# Patient Record
Sex: FEMALE | Race: WHITE | NOT HISPANIC OR LATINO | Employment: OTHER | URBAN - METROPOLITAN AREA
[De-identification: names, ages, dates, MRNs, and addresses within clinical notes are randomized per-mention and may not be internally consistent; named-entity substitution may affect disease eponyms.]

---

## 2017-06-14 ENCOUNTER — OFFICE VISIT (OUTPATIENT)
Dept: FAMILY MEDICINE | Facility: HOSPITAL | Age: 52
End: 2017-06-14
Attending: FAMILY MEDICINE
Payer: MEDICAID

## 2017-06-14 VITALS
BODY MASS INDEX: 36.15 KG/M2 | HEIGHT: 69 IN | SYSTOLIC BLOOD PRESSURE: 157 MMHG | WEIGHT: 244.06 LBS | HEART RATE: 108 BPM | DIASTOLIC BLOOD PRESSURE: 103 MMHG

## 2017-06-14 DIAGNOSIS — Z76.89 ENCOUNTER TO ESTABLISH CARE: Primary | ICD-10-CM

## 2017-06-14 DIAGNOSIS — Z09 HOSPITAL DISCHARGE FOLLOW-UP: ICD-10-CM

## 2017-06-14 DIAGNOSIS — Z86.19 HISTORY OF SEPSIS: ICD-10-CM

## 2017-06-14 DIAGNOSIS — R56.9 SEIZURES: ICD-10-CM

## 2017-06-14 PROCEDURE — 99214 OFFICE O/P EST MOD 30 MIN: CPT | Performed by: FAMILY MEDICINE

## 2017-06-14 RX ORDER — PANTOPRAZOLE SODIUM 40 MG/1
TABLET, DELAYED RELEASE ORAL
COMMUNITY
Start: 2017-06-01 | End: 2017-06-14

## 2017-06-14 RX ORDER — CEFUROXIME AXETIL 500 MG/1
TABLET ORAL
COMMUNITY
Start: 2017-06-01 | End: 2017-06-26

## 2017-06-14 RX ORDER — HYDROCODONE BITARTRATE AND ACETAMINOPHEN 7.5; 325 MG/1; MG/1
TABLET ORAL
COMMUNITY
Start: 2017-06-01 | End: 2017-08-04

## 2017-06-14 RX ORDER — CYCLOBENZAPRINE HCL 10 MG
TABLET ORAL
COMMUNITY
Start: 2017-04-23 | End: 2017-06-26

## 2017-06-14 RX ORDER — METRONIDAZOLE 500 MG/1
TABLET ORAL
COMMUNITY
Start: 2017-06-01 | End: 2017-06-26

## 2017-06-14 RX ORDER — AMLODIPINE BESYLATE 10 MG/1
TABLET ORAL
COMMUNITY
Start: 2017-06-01 | End: 2017-06-14

## 2017-06-14 RX ORDER — ETHOSUXIMIDE 250 MG/1
CAPSULE ORAL
COMMUNITY
Start: 2017-04-20 | End: 2017-06-14

## 2017-06-14 RX ORDER — ONDANSETRON 4 MG/1
TABLET, FILM COATED ORAL
COMMUNITY
Start: 2017-06-01 | End: 2017-06-26

## 2017-06-14 NOTE — PROGRESS NOTES
Subjective:       Patient ID: Trudi Howard is a 51 y.o. female.    Chief Complaint: Follow-up    Ms. Howard is a 51 year old female with PMH seizure d/o, fibroidectomy who presens to establish care s/p hospitalization. Patient reports she was recently hospitalized at [facility] for sepsis after passing out. Patient reports she was in the hospital for 37 days and had to have an ex lap and TRAE drain placed. Reports she has been feeling tired since leaving the hospital. Denies f/chills. States she is feeling much better overall. History of seizures, petit mal and grand mal, with last seizure 5 years ago. Currently taking Diamox, Luminal and Zarontin. States she has a prodrome and typically knows when the seizures are coming on. Sees Dr. Thao who manages her meds. Sees Dr. Mcleod for OB/GYN with last PAP and mammogram in 2014. States she will schedule follow-up with Gen Surg today for possible drain removal. No other complaints at this time.       Review of Systems   Constitutional: Negative for chills and fever.   HENT: Negative for congestion and sore throat.    Eyes: Negative for pain and redness.   Respiratory: Positive for cough. Negative for shortness of breath.    Cardiovascular: Negative for chest pain and palpitations.   Gastrointestinal: Negative for diarrhea, nausea and vomiting.   Genitourinary: Negative for difficulty urinating and dysuria.   Musculoskeletal: Negative for arthralgias and myalgias.        TRAE drain site pain.    Skin: Negative for rash.   Neurological: Negative for syncope and headaches.   Psychiatric/Behavioral: Negative for agitation and confusion.       Objective:      Vitals:    06/14/17 1029   BP: (!) 157/103   Pulse: 108     Physical Exam   Constitutional: She is oriented to person, place, and time. She appears well-developed and well-nourished. No distress.   HENT:   Head: Normocephalic and atraumatic.   Right Ear: External ear normal.   Left Ear: External ear normal.    Eyes: Conjunctivae are normal. Pupils are equal, round, and reactive to light. Right eye exhibits no discharge. Left eye exhibits no discharge.   Neck: Normal range of motion. Neck supple.   Cardiovascular: Normal rate and regular rhythm.  Exam reveals no gallop and no friction rub.    No murmur heard.  Pulmonary/Chest: Effort normal and breath sounds normal. No respiratory distress. She has no wheezes. She has no rales.   Abdominal: Soft. She exhibits no distension.   Musculoskeletal: Normal range of motion.   Neurological: She is alert and oriented to person, place, and time. She has normal reflexes.   Skin: Skin is warm. No rash noted. No erythema.   TRAE drain in place, no erythema or purulent drainage around site. Large mid-abdominal incision, well healing. Some scabbing at cephalad region. No sign of erythema or pustular drainage. Slightly decreased BS's but present. + L sided abdominal tenderness.    Psychiatric: She has a normal mood and affect. Her behavior is normal.       Assessment:       1. Encounter to establish care    2. Hospital discharge follow-up    3. Seizures    4. History of sepsis        Plan:       Encounter to establish care  -     TSH; Future; Expected date: 06/14/2017  -     CBC auto differential; Future; Expected date: 06/14/2017  -     Comprehensive metabolic panel; Future; Expected date: 06/14/2017    Hospital discharge follow-up  -     TSH; Future; Expected date: 06/14/2017  -     CBC auto differential; Future; Expected date: 06/14/2017  -     Comprehensive metabolic panel; Future; Expected date: 06/14/2017    Seizures    History of sepsis    Will order follow-up labs today. Repeat BP at 118/80. Patient denies known history of HTN, believes it to be 2/2 pain. Patient will stop Norvasc and take BP two times a day at home and bring in log in two weeks. F/U with Gen Surg today. No signs of infection on exam. Continue to follow with Dr. Thao for seizures. Records from [facility]  requested.     Return in about 2 weeks (around 6/28/2017).

## 2017-06-26 ENCOUNTER — OFFICE VISIT (OUTPATIENT)
Dept: SURGERY | Facility: CLINIC | Age: 52
End: 2017-06-26
Payer: MEDICAID

## 2017-06-26 VITALS
DIASTOLIC BLOOD PRESSURE: 72 MMHG | SYSTOLIC BLOOD PRESSURE: 122 MMHG | HEIGHT: 70 IN | WEIGHT: 143 LBS | BODY MASS INDEX: 20.47 KG/M2

## 2017-06-26 PROCEDURE — 99024 POSTOP FOLLOW-UP VISIT: CPT | Mod: ,,, | Performed by: SURGERY

## 2017-06-26 NOTE — PROGRESS NOTES
Subjective:       Patient ID: Trudi Howard is a 51 y.o. female.    Chief Complaint: Post-op Evaluation (Exp Laparotomy with Abscess drainage 4/30/2017)      HPI:  Trudi Howard is here for post-op. Patient has no systemic complaints. Post operative   pain is under control.  Tolerating diet, no nausea/vomiting.  Having normal bowel movements.        Objective:      Physical Exam   Constitutional: She is oriented to person, place, and time. She is cooperative. No distress.   Abdominal: Soft. She exhibits no distension. There is no tenderness. There is no rebound and no guarding.   Neurological: She is oriented to person, place, and time.   Skin:   Incisions are clean, dry and intact  There is no evidence of infection, hematoma or seroma        Assessment/Plan:   Abdominal abscess  -     CT Abdomen Pelvis W Wo Contrast; Future; Expected date: 06/26/2017      Patient's last drain is only putting out a little serous fluid.It was removed by me today. Everything appears to be healing well although patient still looks weak from her deal. Will get a follow-up CT scan to make sure everything is resolved.  Return depends on what the CT scan shows.

## 2017-07-21 ENCOUNTER — OFFICE VISIT (OUTPATIENT)
Dept: FAMILY MEDICINE | Facility: HOSPITAL | Age: 52
End: 2017-07-21
Payer: MEDICAID

## 2017-07-21 VITALS
BODY MASS INDEX: 33.77 KG/M2 | DIASTOLIC BLOOD PRESSURE: 81 MMHG | HEIGHT: 70 IN | WEIGHT: 235.88 LBS | HEART RATE: 89 BPM | SYSTOLIC BLOOD PRESSURE: 129 MMHG

## 2017-07-21 DIAGNOSIS — G40.909 SEIZURE DISORDER: ICD-10-CM

## 2017-07-21 DIAGNOSIS — G89.29 CHRONIC LEFT SHOULDER PAIN: Primary | ICD-10-CM

## 2017-07-21 DIAGNOSIS — Z01.30 BLOOD PRESSURE CHECK: ICD-10-CM

## 2017-07-21 DIAGNOSIS — M25.512 CHRONIC LEFT SHOULDER PAIN: Primary | ICD-10-CM

## 2017-07-21 PROCEDURE — 99214 OFFICE O/P EST MOD 30 MIN: CPT | Performed by: FAMILY MEDICINE

## 2017-07-21 RX ORDER — IBUPROFEN 600 MG/1
600 TABLET ORAL 3 TIMES DAILY
Qty: 21 TABLET | Refills: 0 | Status: SHIPPED | OUTPATIENT
Start: 2017-07-21 | End: 2017-07-28

## 2017-07-21 NOTE — PROGRESS NOTES
Subjective:       Patient ID: Trudi Howard is a 51 y.o. female.    Chief Complaint: Follow-up and Arm Pain    Ms. Howard is a 51 year old female with PMH seizure d/o, high blood pressure reading who presents for follow-up and shoulder pain. BP normal at home, 129/81 today. Shoulder pain has been going on for three months, is worse with movement and pressure and not relieved with Tylenol. Has not tried icing. States it hurts a lot to raise her arm over her head. Does not remember a trauma incident to her shoulder. Denies f/chills/swelling/skin changes.     Refused mammogram order today.       Arm Pain    Pertinent negatives include no chest pain.     Review of Systems   Constitutional: Negative for chills and fever.   HENT: Negative for congestion and sore throat.    Eyes: Negative for pain and redness.   Respiratory: Negative for cough and shortness of breath.    Cardiovascular: Negative for chest pain and palpitations.   Gastrointestinal: Negative for abdominal pain, diarrhea, nausea and vomiting.   Genitourinary: Negative for difficulty urinating and dysuria.   Musculoskeletal: Negative for arthralgias and myalgias.   Skin: Negative for rash.   Neurological: Negative for syncope and headaches.   Psychiatric/Behavioral: Negative for agitation and confusion.       Objective:      Vitals:    07/21/17 1132   BP: 129/81   Pulse: 89     Physical Exam   Constitutional: She is oriented to person, place, and time. She appears well-developed and well-nourished. No distress.   HENT:   Head: Normocephalic and atraumatic.   Right Ear: External ear normal.   Left Ear: External ear normal.   Eyes: Conjunctivae are normal. Pupils are equal, round, and reactive to light. Right eye exhibits no discharge. Left eye exhibits no discharge.   Neck: Normal range of motion. Neck supple. No thyromegaly present.   Cardiovascular: Normal rate and regular rhythm.  Exam reveals no gallop and no friction rub.    No murmur  heard.  Pulmonary/Chest: Effort normal and breath sounds normal. No respiratory distress. She has no wheezes. She has no rales.   Abdominal: Soft. Bowel sounds are normal. She exhibits no distension. There is no tenderness.   Musculoskeletal: Normal range of motion.   ROM intact, able to touch hands over head. +TTP over acromion and joint capsule. +Empty can test. Poor strength with resistance in L shoulder.    Neurological: She is alert and oriented to person, place, and time. She has normal reflexes.   Skin: Skin is warm. No rash noted. No erythema.   Psychiatric: She has a normal mood and affect. Her behavior is normal.       Assessment:       1. Chronic left shoulder pain    2. Blood pressure check    3. Seizure disorder    4. BMI 33.0-33.9,adult        Plan:       Chronic left shoulder pain  -     X-Ray Shoulder 2 or More Views Left; Future; Expected date: 07/21/2017  -     Ambulatory Referral to Physical/Occupational Therapy  -     ibuprofen (ADVIL,MOTRIN) 600 MG tablet; Take 1 tablet (600 mg total) by mouth 3 (three) times daily.  Dispense: 21 tablet; Refill: 0    Blood pressure check    Seizure disorder    BMI 33.0-33.9,adult  -     Hemoglobin A1c; Future; Expected date: 07/21/2017  -     Lipid panel; Future; Expected date: 07/21/2017    F/U and consider MRI in two weeks. Likely chronic rotator cuff tear. Patient will try Ice, ibuprofen and physical therapy. Consider ortho consult.     Return in about 2 weeks (around 8/4/2017).

## 2017-07-27 ENCOUNTER — TELEPHONE (OUTPATIENT)
Dept: SURGERY | Facility: CLINIC | Age: 52
End: 2017-07-27

## 2017-07-27 NOTE — TELEPHONE ENCOUNTER
----- Message from Navid BORJA MD sent at 7/26/2017  4:02 PM CDT -----  Call patient: CT normal; no further f/u needed

## 2017-07-27 NOTE — TELEPHONE ENCOUNTER
Tried to call pt. Phone rings once and goes to voicemail which is full.  Not able to leave message

## 2017-07-28 ENCOUNTER — HOSPITAL ENCOUNTER (OUTPATIENT)
Dept: RADIOLOGY | Facility: HOSPITAL | Age: 52
Discharge: HOME | End: 2017-07-28
Attending: FAMILY MEDICINE
Payer: MEDICAID

## 2017-07-28 DIAGNOSIS — M25.512 CHRONIC LEFT SHOULDER PAIN: ICD-10-CM

## 2017-07-28 DIAGNOSIS — G89.29 CHRONIC LEFT SHOULDER PAIN: ICD-10-CM

## 2017-07-28 PROCEDURE — 73030 X-RAY EXAM OF SHOULDER: CPT | Mod: 26,LT,, | Performed by: RADIOLOGY

## 2017-07-28 PROCEDURE — 73030 X-RAY EXAM OF SHOULDER: CPT | Mod: TC,LT

## 2017-08-04 ENCOUNTER — OFFICE VISIT (OUTPATIENT)
Dept: FAMILY MEDICINE | Facility: HOSPITAL | Age: 52
End: 2017-08-04
Attending: FAMILY MEDICINE
Payer: MEDICAID

## 2017-08-04 VITALS
WEIGHT: 224.63 LBS | BODY MASS INDEX: 33.27 KG/M2 | DIASTOLIC BLOOD PRESSURE: 83 MMHG | SYSTOLIC BLOOD PRESSURE: 136 MMHG | HEART RATE: 84 BPM | HEIGHT: 69 IN

## 2017-08-04 DIAGNOSIS — G89.29 CHRONIC LEFT SHOULDER PAIN: Primary | ICD-10-CM

## 2017-08-04 DIAGNOSIS — Z12.31 SCREENING MAMMOGRAM, ENCOUNTER FOR: ICD-10-CM

## 2017-08-04 DIAGNOSIS — M25.512 CHRONIC LEFT SHOULDER PAIN: Primary | ICD-10-CM

## 2017-08-04 PROCEDURE — 99214 OFFICE O/P EST MOD 30 MIN: CPT | Performed by: FAMILY MEDICINE

## 2017-08-04 RX ORDER — PHENOBARBITAL 32.4 MG/1
TABLET ORAL
Refills: 5 | COMMUNITY
Start: 2017-07-19
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
## 2017-08-06 NOTE — PROGRESS NOTES
I assume primary medical responsibility for this patient, I have reviewed the case history, findings, diagnosis and treatment plan with the resident and agree that the care is reasonable and necessary. This service has been performed by a resident without the presence of a teaching physician under the primary care exception  Adriana Ashley  8/6/2017

## 2017-08-15 ENCOUNTER — CLINICAL SUPPORT (OUTPATIENT)
Dept: REHABILITATION | Facility: HOSPITAL | Age: 52
End: 2017-08-15
Attending: FAMILY MEDICINE
Payer: MEDICAID

## 2017-08-15 DIAGNOSIS — M25.512 CHRONIC LEFT SHOULDER PAIN: Primary | ICD-10-CM

## 2017-08-15 DIAGNOSIS — G89.29 CHRONIC LEFT SHOULDER PAIN: Primary | ICD-10-CM

## 2017-08-15 PROCEDURE — 97161 PT EVAL LOW COMPLEX 20 MIN: CPT

## 2017-08-15 PROCEDURE — 97110 THERAPEUTIC EXERCISES: CPT

## 2017-08-15 NOTE — PROGRESS NOTES
Physical Therapy Evaluation    Name: Trudi Howard  Clinic Number: [id number]        Diagnosis:   Encounter Diagnosis   Name Primary?    Chronic left shoulder pain Yes     Physician: Dejon Rollins, *  Treatment Orders: PT Eval and Treat    Past Medical History:   Diagnosis Date    Seizures      Current Outpatient Prescriptions   Medication Sig    acetaZOLAMIDE (DIAMOX) 250 MG tablet     ethosuximide (ZARONTIN) 250 mg capsule Take 250 mg by mouth 2 (two) times daily.      phenobarbital (LUMINAL) 32.4 MG tablet 3 QAM AND 5 TS HS     No current facility-administered medications for this visit.      Review of patient's allergies indicates:   Allergen Reactions    Aspirin      Contraindicated with other meds    Amoxicillin      rash     Precautions: hx of seizures (last one 5 years ago)    Evaluation Date: 8/15/2017  Visit # authorized: 1/20  Authorization period: -12/31/2017  Plan of care Expiration: 9/5/2017      Subjective     Onset/VANCE: gradual    Primary concern/ Chief complaints:    Trudi is a 51 y.o. 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  Injury/surgery occurred on over 1 month ago. Pt is left hand dominant.     X-ray was taken and findings were as follows: Internal and external rotation views are not labeled. The patient does not achieve complete internal rotation suggesting limited mobility perhaps due to pain. No fracture, dislocation, radiopaque retained foreign body, lytic or blastic lesion or extraosseous calcification. No significant abnormality in the imaged portion of the chest.     Previous treatment included ibuprofen, rest. Pt reports that reaching, lifting, carrying increases left shoulder pain and  reports left shoulder pain at worst is a 10 on the VAS. Pt uses ice, rest,  ibuprofen to control left shoulder pain symptoms. Pt has a decreased ability to perform ADLs such as reaching, lifting, dressing. Pt reports no numbness and tingling in either UE. No cultural, environmental, or spiritual barriers identified to treatment or learning.    Pain Scale: Trudi rates pain on a scale of 0-10 to be 10 at worst; 7 currently; 5 at best .    Patient Goals: Pt would like to decrease pain and increase function so she can return to pain-reduced completion of normal daily activities.      Objective     Observation: ambulates independently, no guarded posture    Posture: rounded shoulders    Passive Range of Motion:   Shoulder Left Right   Flexion 130 170   Abduction 60 90   ER at 0 WNL WNL   ER at 90 nt nt   IR WNL WNL      Active Range of Motion:   Shoulder Left Right   Flexion 110 160   Abduction 55 55   ER at 0 75% 100%   ER at 90 nt nt   IR WNL WNL     Functional Movement Screen:   LUE: able to reach top of head from overhead, able to reach left back pocket when reaching behind  RUE: able to reach C7 from overhead, able to reach L3 when reaching behind    Strength:  Shoulder Left Right   Flexion 2+ 4   Abduction 2+ 4   ER 2+ 4   IR 3+ 4+   Mid trapezius 3+ 3+   Low trapezius 3+ 3+   Rhomboids 4 4       Special Tests:  AC Joint Left Right   AC Joint Compression Test + -   Empty Can Test + -        Joint Mobility and Palpation: ttp to L AC joint and supraspinatus muscle belly    Sensation: WNL    Flexibility: decreased in all planes due to pain    G-code Reporting:  Category: Mobility (Mobility, Self-Care, etc. )  Tool: FOTO shoulder  Score: 64% impairment  Goal:  CK ( 40%-60% impairment)  Current:  CL ( 60%-80% impairment)    PT Evaluation Completed? Yes  Discussed Plan of Care with patient: Yes    TREATMENT:  Trudi received therapeutic exercises to develop strength and endurance, flexibility for 15 minutes including:scapular rows with orange theraband 3x10    Instructed pt. Regarding:  Proper technique with all exercises. Pt demo good understanding of the education provided. Trudi demonstrated good return demonstration of activities.      Assessment     This is a 51 y.o. female referred to outpatient physical therapy and presents with a medical diagnosis of left shoulder pain and demonstrates limitations as described in the problem list. Pt will benefit from physical therapy services in order to maximize pain free and/or functional use of left shoulder. The following goals were discussed with the patient and patient is in agreement with them as to be addressed in the treatment plan. Pt was given a HEP consisting of scapular rows. Pt verbally understood the instructions as they were given and demonstrated proper form and technique during therapy. Pt was advised to perform these exercises free of pain, and to stop performing them if pain occurs.     Patient History Examination Clinical Presentation Clinical Decision Making   Comorbidities:  Hx of seizures, hx of sepsis, sacral decubitus ulcer    Personal Factors:  none     Activity and Participation Restriction:  Mobility  General tasks and demands    Body Systems:  Musculoskeletal      Body Regions:  Upper extremity Stable and uncomplicated     Low            Medical necessity is demonstrated by the following IMPAIRMENTS/PROBLEM LIST:   1)Increase in pain level limiting function   2)Decreased range of motion limiting function   3)Decreased strength limiting function   4)Lack of HEP    GOALS: Short Term Goals:  3 weeks  1. Report decreased left shoulder pain  <   / =  7  /10  to increase tolerance for completion of ADLs  2. Increased MMT of left shoulder flexion to 3+/5 in order to increase tolerance for ADLs and work activities.  3. Increased MMT of left shoulder abduction to 3+/5 in order to increase tolerance for ADLs and work activities.  4. Pt to increase left shoulder flexion range of motion to >/= 150 degrees in order to demonstrate  improvements in functional mobility.   5. Pt to tolerate HEP to improve ROM and independence with ADL's    Long Term Goals: 6 weeks  1. Report decreased left shoulder pain  <   / =  6  /10  to increase tolerance for completion of ADLs  2. Increased MMT of left shoulder flexion to 4-/5 in order to increase tolerance for ADLs and work activities.  3. Increased MMT of left shoulder abduction to 4-/5 in order to increase tolerance for ADLs and work activities.  4. Pt to increase left shoulder flexion range of motion to >/= 160 degrees in order to demonstrate improvements in functional mobility.   5. Pt to increase left shoulder abduction range of motion to >/= 120 degrees in order to demonstrate improvements in functional mobility.   6. Pt to be Independent with HEP to improve ROM and independence with ADL's      Plan     Pt will be treated by physical therapy 1-3 times a week for 6 weeks for Pt Education, HEP, therapeutic exercises, neuromuscular re-education, joint mobilizations, modalities prn to achieve established goals. Trudi may at times be seen by a PTA as part of the Rehab Team.     Cont PT for  6  weeks.     Carla Bragg, NIELS    8/16/2017    I certify the need for these services furnished under this plan of treatment and while under my care.  ______________________________ Physician/Referring Practitioner  Date of Signature

## 2017-08-16 NOTE — PLAN OF CARE
Physical Therapy Evaluation    Name: Trudi Howard  Clinic Number: [id number]        Diagnosis:   Encounter Diagnosis   Name Primary?    Chronic left shoulder pain Yes     Physician: Dejon Rollins, *  Treatment Orders: PT Eval and Treat    Past Medical History:   Diagnosis Date    Seizures      Current Outpatient Prescriptions   Medication Sig    acetaZOLAMIDE (DIAMOX) 250 MG tablet     ethosuximide (ZARONTIN) 250 mg capsule Take 250 mg by mouth 2 (two) times daily.      phenobarbital (LUMINAL) 32.4 MG tablet 3 QAM AND 5 TS HS     No current facility-administered medications for this visit.      Review of patient's allergies indicates:   Allergen Reactions    Aspirin      Contraindicated with other meds    Amoxicillin      rash     Precautions: hx of seizures (last one 5 years ago)    Evaluation Date: 8/15/2017  Visit # authorized: 1/20  Authorization period: -12/31/2017  Plan of care Expiration: 9/5/2017      Subjective     Onset/VANCE: gradual    Primary concern/ Chief complaints:    Trudi is a 51 y.o. 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  Injury/surgery occurred on over 1 month ago. Pt is left hand dominant.     X-ray was taken and findings were as follows: Internal and external rotation views are not labeled. The patient does not achieve complete internal rotation suggesting limited mobility perhaps due to pain. No fracture, dislocation, radiopaque retained foreign body, lytic or blastic lesion or extraosseous calcification. No significant abnormality in the imaged portion of the chest.     Previous treatment included ibuprofen, rest. Pt reports that reaching, lifting, carrying increases left shoulder pain and  reports left shoulder pain at worst is a 10 on the VAS. Pt uses ice, rest,  ibuprofen to control left shoulder pain symptoms. Pt has a decreased ability to perform ADLs such as reaching, lifting, dressing. Pt reports no numbness and tingling in either UE. No cultural, environmental, or spiritual barriers identified to treatment or learning.    Pain Scale: Trudi rates pain on a scale of 0-10 to be 10 at worst; 7 currently; 5 at best .    Patient Goals: Pt would like to decrease pain and increase function so she can return to pain-reduced completion of normal daily activities.      Objective     Observation: ambulates independently, no guarded posture    Posture: rounded shoulders    Passive Range of Motion:   Shoulder Left Right   Flexion 130 170   Abduction 60 90   ER at 0 WNL WNL   ER at 90 nt nt   IR WNL WNL      Active Range of Motion:   Shoulder Left Right   Flexion 110 160   Abduction 55 55   ER at 0 75% 100%   ER at 90 nt nt   IR WNL WNL     Functional Movement Screen:   LUE: able to reach top of head from overhead, able to reach left back pocket when reaching behind  RUE: able to reach C7 from overhead, able to reach L3 when reaching behind    Strength:  Shoulder Left Right   Flexion 2+ 4   Abduction 2+ 4   ER 2+ 4   IR 3+ 4+   Mid trapezius 3+ 3+   Low trapezius 3+ 3+   Rhomboids 4 4       Special Tests:  AC Joint Left Right   AC Joint Compression Test + -   Empty Can Test + -        Joint Mobility and Palpation: ttp to L AC joint and supraspinatus muscle belly    Sensation: WNL    Flexibility: decreased in all planes due to pain    G-code Reporting:  Category: Mobility (Mobility, Self-Care, etc. )  Tool: FOTO shoulder  Score: 64% impairment  Goal:  CK ( 40%-60% impairment)  Current:  CL ( 60%-80% impairment)    PT Evaluation Completed? Yes  Discussed Plan of Care with patient: Yes    TREATMENT:  Trudi received therapeutic exercises to develop strength and endurance, flexibility for 15 minutes including:scapular rows with orange theraband 3x10    Instructed pt. Regarding:  Proper technique with all exercises. Pt demo good understanding of the education provided. Trudi demonstrated good return demonstration of activities.      Assessment     This is a 51 y.o. female referred to outpatient physical therapy and presents with a medical diagnosis of left shoulder pain and demonstrates limitations as described in the problem list. Pt will benefit from physical therapy services in order to maximize pain free and/or functional use of left shoulder. The following goals were discussed with the patient and patient is in agreement with them as to be addressed in the treatment plan. Pt was given a HEP consisting of scapular rows. Pt verbally understood the instructions as they were given and demonstrated proper form and technique during therapy. Pt was advised to perform these exercises free of pain, and to stop performing them if pain occurs.     Patient History Examination Clinical Presentation Clinical Decision Making   Comorbidities:  Hx of seizures, hx of sepsis, sacral decubitus ulcer    Personal Factors:  none     Activity and Participation Restriction:  Mobility  General tasks and demands    Body Systems:  Musculoskeletal      Body Regions:  Upper extremity Stable and uncomplicated     Low            Medical necessity is demonstrated by the following IMPAIRMENTS/PROBLEM LIST:   1)Increase in pain level limiting function   2)Decreased range of motion limiting function   3)Decreased strength limiting function   4)Lack of HEP    GOALS: Short Term Goals:  3 weeks  1. Report decreased left shoulder pain  <   / =  7  /10  to increase tolerance for completion of ADLs  2. Increased MMT of left shoulder flexion to 3+/5 in order to increase tolerance for ADLs and work activities.  3. Increased MMT of left shoulder abduction to 3+/5 in order to increase tolerance for ADLs and work activities.  4. Pt to increase left shoulder flexion range of motion to >/= 150 degrees in order to demonstrate  improvements in functional mobility.   5. Pt to tolerate HEP to improve ROM and independence with ADL's    Long Term Goals: 6 weeks  1. Report decreased left shoulder pain  <   / =  6  /10  to increase tolerance for completion of ADLs  2. Increased MMT of left shoulder flexion to 4-/5 in order to increase tolerance for ADLs and work activities.  3. Increased MMT of left shoulder abduction to 4-/5 in order to increase tolerance for ADLs and work activities.  4. Pt to increase left shoulder flexion range of motion to >/= 160 degrees in order to demonstrate improvements in functional mobility.   5. Pt to increase left shoulder abduction range of motion to >/= 120 degrees in order to demonstrate improvements in functional mobility.   6. Pt to be Independent with HEP to improve ROM and independence with ADL's      Plan     Pt will be treated by physical therapy 1-3 times a week for 6 weeks for Pt Education, HEP, therapeutic exercises, neuromuscular re-education, joint mobilizations, modalities prn to achieve established goals. Trudi may at times be seen by a PTA as part of the Rehab Team.     Cont PT for  6  weeks.     Carla Bragg, NIELS    8/15/2017    I certify the need for these services furnished under this plan of treatment and while under my care.  ______________________________ Physician/Referring Practitioner  Date of Signature

## 2017-08-25 ENCOUNTER — CLINICAL SUPPORT (OUTPATIENT)
Dept: REHABILITATION | Facility: HOSPITAL | Age: 52
End: 2017-08-25
Attending: FAMILY MEDICINE
Payer: MEDICAID

## 2017-08-25 ENCOUNTER — LAB VISIT (OUTPATIENT)
Dept: LAB | Facility: HOSPITAL | Age: 52
End: 2017-08-25
Attending: OBSTETRICS & GYNECOLOGY
Payer: MEDICAID

## 2017-08-25 ENCOUNTER — OFFICE VISIT (OUTPATIENT)
Dept: OBSTETRICS AND GYNECOLOGY | Facility: CLINIC | Age: 52
End: 2017-08-25
Payer: MEDICAID

## 2017-08-25 VITALS
HEIGHT: 69 IN | WEIGHT: 227.94 LBS | SYSTOLIC BLOOD PRESSURE: 124 MMHG | DIASTOLIC BLOOD PRESSURE: 86 MMHG | BODY MASS INDEX: 33.76 KG/M2

## 2017-08-25 DIAGNOSIS — Z01.419 WELL WOMAN EXAM WITH ROUTINE GYNECOLOGICAL EXAM: ICD-10-CM

## 2017-08-25 DIAGNOSIS — M25.512 CHRONIC LEFT SHOULDER PAIN: Primary | ICD-10-CM

## 2017-08-25 DIAGNOSIS — Z12.31 VISIT FOR SCREENING MAMMOGRAM: ICD-10-CM

## 2017-08-25 DIAGNOSIS — Z12.4 ROUTINE PAPANICOLAOU SMEAR: ICD-10-CM

## 2017-08-25 DIAGNOSIS — N94.6 DYSMENORRHEA: Primary | ICD-10-CM

## 2017-08-25 DIAGNOSIS — N94.6 DYSMENORRHEA: ICD-10-CM

## 2017-08-25 DIAGNOSIS — G89.29 CHRONIC LEFT SHOULDER PAIN: Primary | ICD-10-CM

## 2017-08-25 LAB
ALBUMIN SERPL BCP-MCNC: 4 G/DL
ALP SERPL-CCNC: 144 U/L
ALT SERPL W/O P-5'-P-CCNC: 14 U/L
ANION GAP SERPL CALC-SCNC: 10 MMOL/L
AST SERPL-CCNC: 14 U/L
BILIRUB SERPL-MCNC: 0.2 MG/DL
BUN SERPL-MCNC: 9 MG/DL
CALCIUM SERPL-MCNC: 9.7 MG/DL
CHLORIDE SERPL-SCNC: 110 MMOL/L
CO2 SERPL-SCNC: 20 MMOL/L
CREAT SERPL-MCNC: 0.7 MG/DL
EST. GFR  (AFRICAN AMERICAN): >60 ML/MIN/1.73 M^2
EST. GFR  (NON AFRICAN AMERICAN): >60 ML/MIN/1.73 M^2
FSH SERPL-ACNC: 62.8 MIU/ML
GLUCOSE SERPL-MCNC: 100 MG/DL
POTASSIUM SERPL-SCNC: 3.7 MMOL/L
PROT SERPL-MCNC: 8.1 G/DL
SODIUM SERPL-SCNC: 140 MMOL/L
T4 FREE SERPL-MCNC: 0.84 NG/DL
TSH SERPL DL<=0.005 MIU/L-ACNC: 4.28 UIU/ML

## 2017-08-25 PROCEDURE — 84443 ASSAY THYROID STIM HORMONE: CPT

## 2017-08-25 PROCEDURE — 88175 CYTOPATH C/V AUTO FLUID REDO: CPT

## 2017-08-25 PROCEDURE — 36415 COLL VENOUS BLD VENIPUNCTURE: CPT

## 2017-08-25 PROCEDURE — 84439 ASSAY OF FREE THYROXINE: CPT

## 2017-08-25 PROCEDURE — 99212 OFFICE O/P EST SF 10 MIN: CPT | Mod: PBBFAC,PO | Performed by: OBSTETRICS & GYNECOLOGY

## 2017-08-25 PROCEDURE — 80053 COMPREHEN METABOLIC PANEL: CPT

## 2017-08-25 PROCEDURE — 99386 PREV VISIT NEW AGE 40-64: CPT | Mod: S$PBB,,, | Performed by: OBSTETRICS & GYNECOLOGY

## 2017-08-25 PROCEDURE — 83001 ASSAY OF GONADOTROPIN (FSH): CPT

## 2017-08-25 PROCEDURE — 99999 PR PBB SHADOW E&M-EST. PATIENT-LVL II: CPT | Mod: PBBFAC,,, | Performed by: OBSTETRICS & GYNECOLOGY

## 2017-08-25 PROCEDURE — 97110 THERAPEUTIC EXERCISES: CPT

## 2017-08-25 NOTE — PROGRESS NOTES
Physical Therapy Progress Note     Name: Trudi Howard  Clinic Number: [id number]  Diagnosis: No diagnosis found.  Physician: Dejon Rollins, *  Treatment Orders: PT Eval and Treat  Past Medical History:   Diagnosis Date    Seizures        Precautions: standard;  Visit #: 2/20  Authorization Period Expiration:  12/31/2017  Referring Provider: Dejon Rollins, *  Subjective   Pt reports: Continued pain in the L shoulder.  Pain Scale:  5/10    Objective     Patient received individual therapy to increase strength with 1 other patients with activities as follows:     Trudi received therapeutic exercises to develop strength for 50 minutes including:   UBE x6 min fwd  Supine dowel flexion AROM 2x10  Isometric L shoulder ER with YTB 3x8  Isometric IR 3x8 YTB 3x8  Scapular rows with orange theraband 3x10  Scapular retractions 2x10    Ice to L shoulder x10 min at end of treatment    Written Home Exercises Provided: on initial evaluation, includes scapular rows with orange theraband 3x10  Pt demo Good understanding of the education provided. Trudi demonstrated Good return demonstration of activities.     Education provided re:  No spiritual or educational barriers to learning provided    Pt has no cultural, educational or language barriers to learning provided.  Assessment   This is a 51 y.o. female referred to outpatient physical therapy and presents with a medical diagnosis of No diagnosis found. and demonstrates limitations as described in the problem list Pt prognosis is Good. Pt will continue to benefit from skilled outpatient physical therapy to address the deficits listed below in the problem list, provide pt/family education and to maximize pt's level of independence in the home and community environment.     Pt tolerated treatment well and was able to complete all exercises without increases in pain. Had some pain at the end of isometric ER exercises  which decreased with decreased effort. Continue as tolerated.     Anticipated barriers to physical therapy: none    Pt's spiritual, cultural and educational needs considered and pt agreeable to plan of care and goals as stated below:     GOALS: Short Term Goals:  3 weeks  1. Report decreased left shoulder pain  <   / =  7  /10  to increase tolerance for completion of ADLs  2. Increased MMT of left shoulder flexion to 3+/5 in order to increase tolerance for ADLs and work activities.  3. Increased MMT of left shoulder abduction to 3+/5 in order to increase tolerance for ADLs and work activities.  4. Pt to increase left shoulder flexion range of motion to >/= 150 degrees in order to demonstrate improvements in functional mobility.   5. Pt to tolerate HEP to improve ROM and independence with ADL's    Long Term Goals: 6 weeks  1. Report decreased left shoulder pain  <   / =  6  /10  to increase tolerance for completion of ADLs  2. Increased MMT of left shoulder flexion to 4-/5 in order to increase tolerance for ADLs and work activities.  3. Increased MMT of left shoulder abduction to 4-/5 in order to increase tolerance for ADLs and work activities.  4. Pt to increase left shoulder flexion range of motion to >/= 160 degrees in order to demonstrate improvements in functional mobility.   5. Pt to increase left shoulder abduction range of motion to >/= 120 degrees in order to demonstrate improvements in functional mobility.   6. Pt to be Independent with HEP to improve ROM and independence with ADL's     Plan   Continue with established Plan of Care towards PT goals.   PT/PTA face-to-face:discussed Pt's POC and progress towards established PT goals.    Carla Bragg DPT  08/25/2017

## 2017-08-28 NOTE — PROGRESS NOTES
"HPI:   51 y.o.   OB History      Para Term  AB Living    3 3 3     3    SAB TAB Ectopic Multiple Live Births                    Patient's last menstrual period was 2017 (within weeks).    Patient is  here for her annual gynecologic exam.  She has no complaints.     ROS:  GENERAL: No fever, chills, fatigability or weight loss.  SKIN: No rashes, itching or changes in color or texture of skin.  HEAD: No headaches or recent head trauma.  EYES: Visual acuity fine. No photophobia, ocular pain or diplopia.  EARS: Denies ear pain, discharge or vertigo.  NOSE: No loss of smell, no epistaxis or postnasal drip.  MOUTH & THROAT: No hoarseness or change in voice. No excessive gum bleeding.  NODES: Denies swollen glands.  CHEST: Denies MARTINEZ, cyanosis, wheezing, cough and sputum production.  CARDIOVASCULAR: Denies chest pain, PND, orthopnea or reduced exercise tolerance.  ABDOMEN: Appetite fine. No weight loss. Denies diarrhea, abdominal pain, hematemesis or blood in stool.  URINARY: No flank pain, dysuria or hematuria.  PERIPHERAL VASCULAR: No claudication or cyanosis.  MUSCULOSKELETAL: No joint stiffness or swelling. Denies back pain.  NEUROLOGIC: No history of seizures, paralysis, alteration of gait or coordination.    PE:   /86   Ht 5' 9" (1.753 m)   Wt 103.4 kg (227 lb 15.3 oz)   LMP 2017 (Within Weeks)   BMI 33.66 kg/m²   APPEARANCE: Well nourished, well developed, in no acute distress.  NECK: Neck symmetric without masses or thyromegaly.  BREASTS: Symmetrical, no skin changes or visible lesions. No palpable masses, nipple discharge or adenopathy bilaterally.  ABDOMEN: Flat. Soft. No tenderness or masses. No hepatosplenomegaly. No hernias. No CVA tenderness.  VULVA: No lesions. Normal female genitalia.  URETHRAL MEATUS: Normal size and location, no lesions, no prolapse.  URETHRA: No masses, tenderness, prolapse or scarring.  VAGINA: Moist and well rugated, no discharge, no significant " cystocele or rectocele.  CERVIX: No lesions and discharge. PAP done.  UTERUS: Normal size, regular shape, mobile, non-tender, bladder base nontender.  ADNEXA: No masses, tenderness or CDS nodularity.  ANUS PERINEUM: Normal.    PROCEDURES:  Pap smear    Assessment:  Normal Gynecologic Exam    Plan:  Mammogram and Colonoscopy if indicated by current recommendations.  Return to clinic in one year or for any problems or complaints.  Cycles irreg, perimenopause

## 2017-08-29 ENCOUNTER — TELEPHONE (OUTPATIENT)
Dept: OBSTETRICS AND GYNECOLOGY | Facility: CLINIC | Age: 52
End: 2017-08-29

## 2017-09-01 ENCOUNTER — CLINICAL SUPPORT (OUTPATIENT)
Dept: REHABILITATION | Facility: HOSPITAL | Age: 52
End: 2017-09-01
Attending: FAMILY MEDICINE
Payer: MEDICAID

## 2017-09-01 DIAGNOSIS — M25.512 CHRONIC LEFT SHOULDER PAIN: Primary | ICD-10-CM

## 2017-09-01 DIAGNOSIS — G89.29 CHRONIC LEFT SHOULDER PAIN: Primary | ICD-10-CM

## 2017-09-01 PROCEDURE — 97110 THERAPEUTIC EXERCISES: CPT

## 2017-09-01 NOTE — PROGRESS NOTES
Physical Therapy Progress Note     Name: Trudi Howard  Clinic Number: [id number]  Diagnosis: No diagnosis found.  Physician: Dejon Rollins, *  Treatment Orders: PT Eval and Treat  Past Medical History:   Diagnosis Date    Seizures        Precautions: standard;  Visit #: 3/20  Authorization Period Expiration:  12/31/2017  Referring Provider: Dejon Rollins, *  Subjective   Pt reports: Continued pain in the L shoulder.  Pain Scale:  5/10    Objective     Patient received individual therapy to increase strength with 1 other patients with activities as follows:     Trudi received therapeutic exercises to develop strength for 50 minutes including:   UBE x6 min fwd  Supine dowel flexion AROM 2x10  Isometric L shoulder ER with YTB 3x8  Isometric IR 3x8 YTB 3x8  Scapular rows with orange theraband 3x10  Scapular retractions 2x10    Ice to L shoulder x10 min at end of treatment    Written Home Exercises Provided: on initial evaluation, includes scapular rows with orange theraband 3x10  Pt demo Good understanding of the education provided. Trudi demonstrated Good return demonstration of activities.     Education provided re:  No spiritual or educational barriers to learning provided    Pt has no cultural, educational or language barriers to learning provided.  Assessment   This is a 51 y.o. female referred to outpatient physical therapy and presents with a medical diagnosis of No diagnosis found. and demonstrates limitations as described in the problem list Pt prognosis is Good. Pt will continue to benefit from skilled outpatient physical therapy to address the deficits listed below in the problem list, provide pt/family education and to maximize pt's level of independence in the home and community environment.     Pt tolerated treatment well and was able to complete all exercises without increases in pain. Had some pain at the end of isometric ER exercises  which decreased with decreased effort. Continue as tolerated.     Anticipated barriers to physical therapy: none    Pt's spiritual, cultural and educational needs considered and pt agreeable to plan of care and goals as stated below:     GOALS: Short Term Goals:  3 weeks  1. Report decreased left shoulder pain  <   / =  7  /10  to increase tolerance for completion of ADLs  2. Increased MMT of left shoulder flexion to 3+/5 in order to increase tolerance for ADLs and work activities.  3. Increased MMT of left shoulder abduction to 3+/5 in order to increase tolerance for ADLs and work activities.  4. Pt to increase left shoulder flexion range of motion to >/= 150 degrees in order to demonstrate improvements in functional mobility.   5. Pt to tolerate HEP to improve ROM and independence with ADL's    Long Term Goals: 6 weeks  1. Report decreased left shoulder pain  <   / =  6  /10  to increase tolerance for completion of ADLs  2. Increased MMT of left shoulder flexion to 4-/5 in order to increase tolerance for ADLs and work activities.  3. Increased MMT of left shoulder abduction to 4-/5 in order to increase tolerance for ADLs and work activities.  4. Pt to increase left shoulder flexion range of motion to >/= 160 degrees in order to demonstrate improvements in functional mobility.   5. Pt to increase left shoulder abduction range of motion to >/= 120 degrees in order to demonstrate improvements in functional mobility.   6. Pt to be Independent with HEP to improve ROM and independence with ADL's     Plan   Continue with established Plan of Care towards PT goals.   PT/PTA face-to-face:discussed Pt's POC and progress towards established PT goals.    Carla Bragg DPT  09/01/2017

## 2017-09-08 ENCOUNTER — CLINICAL SUPPORT (OUTPATIENT)
Dept: REHABILITATION | Facility: HOSPITAL | Age: 52
End: 2017-09-08
Attending: FAMILY MEDICINE
Payer: MEDICAID

## 2017-09-08 DIAGNOSIS — M25.512 CHRONIC LEFT SHOULDER PAIN: Primary | ICD-10-CM

## 2017-09-08 DIAGNOSIS — G89.29 CHRONIC LEFT SHOULDER PAIN: Primary | ICD-10-CM

## 2017-09-08 PROCEDURE — 97110 THERAPEUTIC EXERCISES: CPT

## 2017-09-08 NOTE — PROGRESS NOTES
Physical Therapy Progress Note     Name: Trudi Howard  Clinic Number: [id number]  Diagnosis:   Encounter Diagnosis   Name Primary?    Chronic left shoulder pain Yes     Physician: Dejon Rollins, *  Treatment Orders: PT Eval and Treat  Past Medical History:   Diagnosis Date    Seizures        Precautions: standard;  Visit #: 3/20  Authorization Period Expiration:  12/31/2017  Referring Provider: Dejon Rollins, *  Subjective   Pt reports: Pt reports that she was unable to make her last visit due to having to drive her son to school to take a test.  Pain Scale:  5/10    Objective     Patient received individual therapy to increase strength with 1 other patients with activities as follows:     Trudi received therapeutic exercises to develop strength for 50 minutes including:   UBE x3 min fwd/3 min back on schwinn  Supine dowel flexion AROM 2x10  Isometric L shoulder ER with YTB 3x8  Isometric IR 3x8 YTB 3x8  Scapular rows with orange theraband 3x10  Scapular retractions 2x10    Ice to L shoulder x10 min at end of treatment    Written Home Exercises Provided: on initial evaluation, includes scapular rows with orange theraband 3x10  Pt demo Good understanding of the education provided. Trudi demonstrated Good return demonstration of activities.     Education provided re:  No spiritual or educational barriers to learning provided    Pt has no cultural, educational or language barriers to learning provided.  Assessment   This is a 51 y.o. female referred to outpatient physical therapy and presents with a medical diagnosis of chronic left shoulder pain and demonstrates limitations as described in the problem list Pt prognosis is Good. Pt will continue to benefit from skilled outpatient physical therapy to address the deficits listed below in the problem list, provide pt/family education and to maximize pt's level of independence in the home and community  environment.     Pt tolerated treatment well and was able to complete all exercises without increases in pain. Continues to havesome pain at the end of isometric ER exercises which decreased with decreased effort. Continue as tolerated.     Anticipated barriers to physical therapy: none    Pt's spiritual, cultural and educational needs considered and pt agreeable to plan of care and goals as stated below:     GOALS: Short Term Goals:  3 weeks  1. Report decreased left shoulder pain  <   / =  7  /10  to increase tolerance for completion of ADLs  2. Increased MMT of left shoulder flexion to 3+/5 in order to increase tolerance for ADLs and work activities.  3. Increased MMT of left shoulder abduction to 3+/5 in order to increase tolerance for ADLs and work activities.  4. Pt to increase left shoulder flexion range of motion to >/= 150 degrees in order to demonstrate improvements in functional mobility.   5. Pt to tolerate HEP to improve ROM and independence with ADL's    Long Term Goals: 6 weeks  1. Report decreased left shoulder pain  <   / =  6  /10  to increase tolerance for completion of ADLs  2. Increased MMT of left shoulder flexion to 4-/5 in order to increase tolerance for ADLs and work activities.  3. Increased MMT of left shoulder abduction to 4-/5 in order to increase tolerance for ADLs and work activities.  4. Pt to increase left shoulder flexion range of motion to >/= 160 degrees in order to demonstrate improvements in functional mobility.   5. Pt to increase left shoulder abduction range of motion to >/= 120 degrees in order to demonstrate improvements in functional mobility.   6. Pt to be Independent with HEP to improve ROM and independence with ADL's     Plan   Continue with established Plan of Care towards PT goals.   PT/PTA face-to-face:discussed Pt's POC and progress towards established PT goals.    Carla Bragg DPT  09/08/2017

## 2017-09-21 ENCOUNTER — CLINICAL SUPPORT (OUTPATIENT)
Dept: REHABILITATION | Facility: HOSPITAL | Age: 52
End: 2017-09-21
Attending: FAMILY MEDICINE
Payer: MEDICAID

## 2017-09-21 DIAGNOSIS — M25.512 CHRONIC LEFT SHOULDER PAIN: Primary | ICD-10-CM

## 2017-09-21 DIAGNOSIS — G89.29 CHRONIC LEFT SHOULDER PAIN: Primary | ICD-10-CM

## 2017-09-21 PROCEDURE — 97110 THERAPEUTIC EXERCISES: CPT

## 2017-09-22 NOTE — PROGRESS NOTES
Physical Therapy Progress Note     Name: Trudi Howard  Clinic Number: [id number]  Diagnosis:   Encounter Diagnosis   Name Primary?    Chronic left shoulder pain Yes     Physician: Dejon Rollins, *  Treatment Orders: PT Eval and Treat  Past Medical History:   Diagnosis Date    Seizures        Precautions: standard;  Visit #: 3/20  Authorization Period Expiration:  12/31/2017  Referring Provider: Dejon Rollins, *  Subjective   Pt reports: Pt reports that she was unable to make her last visit due to having to drive her son to school to take a test.  Pain Scale:  5/10    Objective     Patient received individual therapy to increase strength with 1 other patients with activities as follows:     Trudi received therapeutic exercises to develop strength for 10 minutes including:   Serratus slides in pillowcase 1x2 (discontinued due to pain)    Pt received non-thermal ultrasound (20% pulsed, 1.2W, 3Mhz) for 8 minutes to the L shoulder    Pt received manual therapy x10 minutes including cabrera taping to the L shoulder    Ice to L shoulder x10 min with IFC tens for 30 min     Written Home Exercises Provided: on initial evaluation, includes scapular rows with orange theraband 3x10  Pt demo Good understanding of the education provided. Trudi demonstrated Good return demonstration of activities.     Education provided re:  No spiritual or educational barriers to learning provided    Pt has no cultural, educational or language barriers to learning provided.  Assessment   This is a 51 y.o. female referred to outpatient physical therapy and presents with a medical diagnosis of chronic left shoulder pain and demonstrates limitations as described in the problem list Pt prognosis is Good. Pt will continue to benefit from skilled outpatient physical therapy to address the deficits listed below in the problem list, provide pt/family education and to maximize pt's level  of independence in the home and community environment.     Pt tolerated treatment poorly and was unable to complete today's therex. Tolerated modalities and Valdivia taping well. Continue as tolerated at next visit.     Anticipated barriers to physical therapy: none    Pt's spiritual, cultural and educational needs considered and pt agreeable to plan of care and goals as stated below:     GOALS: Short Term Goals:  3 weeks  1. Report decreased left shoulder pain  <   / =  7  /10  to increase tolerance for completion of ADLs  2. Increased MMT of left shoulder flexion to 3+/5 in order to increase tolerance for ADLs and work activities.  3. Increased MMT of left shoulder abduction to 3+/5 in order to increase tolerance for ADLs and work activities.  4. Pt to increase left shoulder flexion range of motion to >/= 150 degrees in order to demonstrate improvements in functional mobility.   5. Pt to tolerate HEP to improve ROM and independence with ADL's    Long Term Goals: 6 weeks  1. Report decreased left shoulder pain  <   / =  6  /10  to increase tolerance for completion of ADLs  2. Increased MMT of left shoulder flexion to 4-/5 in order to increase tolerance for ADLs and work activities.  3. Increased MMT of left shoulder abduction to 4-/5 in order to increase tolerance for ADLs and work activities.  4. Pt to increase left shoulder flexion range of motion to >/= 160 degrees in order to demonstrate improvements in functional mobility.   5. Pt to increase left shoulder abduction range of motion to >/= 120 degrees in order to demonstrate improvements in functional mobility.   6. Pt to be Independent with HEP to improve ROM and independence with ADL's     Plan   Continue with established Plan of Care towards PT goals.   PT/PTA face-to-face:discussed Pt's POC and progress towards established PT goals.    Carla Bragg DPT  09/22/2017

## 2017-09-29 ENCOUNTER — CLINICAL SUPPORT (OUTPATIENT)
Dept: REHABILITATION | Facility: HOSPITAL | Age: 52
End: 2017-09-29
Attending: FAMILY MEDICINE
Payer: MEDICAID

## 2017-09-29 DIAGNOSIS — M25.512 CHRONIC LEFT SHOULDER PAIN: Primary | ICD-10-CM

## 2017-09-29 DIAGNOSIS — G89.29 CHRONIC LEFT SHOULDER PAIN: Primary | ICD-10-CM

## 2017-09-29 PROCEDURE — 97110 THERAPEUTIC EXERCISES: CPT

## 2017-09-29 NOTE — PROGRESS NOTES
Physical Therapy Progress Note     Name: Trudi Howard  Clinic Number: [id number]  Diagnosis:   Encounter Diagnosis   Name Primary?    Chronic left shoulder pain Yes     Physician: Dejon Rollins, *  Treatment Orders: PT Eval and Treat  Past Medical History:   Diagnosis Date    Seizures        Precautions: standard;  Visit #: 3/20  Authorization Period Expiration:  12/31/2017  Referring Provider: Dejon Rollins, *  Subjective   Pt reports: Pt reports feeling a little better today, still a lot of pain.  Pain Scale:  5/10    Objective     Patient received individual therapy to increase strength with 1 other patients with activities as follows:     Trudi received therapeutic exercises to develop strength for 40 minutes including:   Scapular retractions with YTB  Seated LUE ER isometrics with YTB  Standing shoulder isometrics into flexion, abduction and extension on wall 2x10 each    Pt received non-thermal ultrasound (20% pulsed, 1.2W, 3Mhz) for 8 minutes to the L shoulder    Ice to L shoulder x10 min with IFC tens for 30 min during treatment    Written Home Exercises Provided: on initial evaluation, includes scapular rows with orange theraband 3x10  Pt demo Good understanding of the education provided. Trudi demonstrated Good return demonstration of activities.     Education provided re:  No spiritual or educational barriers to learning provided    Pt has no cultural, educational or language barriers to learning provided.  Assessment   This is a 51 y.o. female referred to outpatient physical therapy and presents with a medical diagnosis of chronic left shoulder pain and demonstrates limitations as described in the problem list Pt prognosis is Good. Pt will continue to benefit from skilled outpatient physical therapy to address the deficits listed below in the problem list, provide pt/family education and to maximize pt's level of independence in the home  and community environment.     Pt tolerated treatment poorly and was unable to complete today's therex. Tolerated modalities well. Continue as tolerated at next visit.     Anticipated barriers to physical therapy: none    Pt's spiritual, cultural and educational needs considered and pt agreeable to plan of care and goals as stated below:     GOALS: Short Term Goals:  3 weeks  1. Report decreased left shoulder pain  <   / =  7  /10  to increase tolerance for completion of ADLs  2. Increased MMT of left shoulder flexion to 3+/5 in order to increase tolerance for ADLs and work activities.  3. Increased MMT of left shoulder abduction to 3+/5 in order to increase tolerance for ADLs and work activities.  4. Pt to increase left shoulder flexion range of motion to >/= 150 degrees in order to demonstrate improvements in functional mobility.   5. Pt to tolerate HEP to improve ROM and independence with ADL's    Long Term Goals: 6 weeks  1. Report decreased left shoulder pain  <   / =  6  /10  to increase tolerance for completion of ADLs  2. Increased MMT of left shoulder flexion to 4-/5 in order to increase tolerance for ADLs and work activities.  3. Increased MMT of left shoulder abduction to 4-/5 in order to increase tolerance for ADLs and work activities.  4. Pt to increase left shoulder flexion range of motion to >/= 160 degrees in order to demonstrate improvements in functional mobility.   5. Pt to increase left shoulder abduction range of motion to >/= 120 degrees in order to demonstrate improvements in functional mobility.   6. Pt to be Independent with HEP to improve ROM and independence with ADL's     Plan   Continue with established Plan of Care towards PT goals.   PT/PTA face-to-face:discussed Pt's POC and progress towards established PT goals.    Carla Bragg DPT  09/29/2017

## 2017-10-09 ENCOUNTER — CLINICAL SUPPORT (OUTPATIENT)
Dept: REHABILITATION | Facility: HOSPITAL | Age: 52
End: 2017-10-09
Attending: FAMILY MEDICINE
Payer: MEDICAID

## 2017-10-09 DIAGNOSIS — G89.29 CHRONIC LEFT SHOULDER PAIN: Primary | ICD-10-CM

## 2017-10-09 DIAGNOSIS — M25.512 CHRONIC LEFT SHOULDER PAIN: Primary | ICD-10-CM

## 2017-10-09 PROCEDURE — 97110 THERAPEUTIC EXERCISES: CPT

## 2017-10-09 NOTE — PROGRESS NOTES
Physical Therapy Treatment Note     Name: Trudi Howard  Clinic Number: [id number]  Diagnosis:   Encounter Diagnosis   Name Primary?    Chronic left shoulder pain Yes     Physician: Dejon Rollins, *  Treatment Orders: PT Eval and Treat  Past Medical History:   Diagnosis Date    Seizures        Precautions: standard;  Visit #: 7/20  Authorization Period Expiration:  12/31/2017  Referring Provider: Dejon Rollins, *  Subjective   Pt reports:  Has experienced increased pain over the last two days.  Pain Scale:  7 or 8/10 L shoulder pain    Objective     Patient received individual therapy to increase strength with 0 other patients with activities as follows:     Trudi received therapeutic exercises to develop strength for 35 minutes including:     Scapular retractions with YTB 2x15  Standing shoulder isometrics into flexion, abduction and extension on wall 2x10 each  Seated LUE ER isometrics with YTB 2x15    Pt received non-thermal ultrasound (20% pulsed, 1.2W, 3Mhz) for 8 minutes to the L shoulder    Ice to L shoulder x10 min with IFC tens for 10 min during treatment    Written Home Exercises Provided: on initial evaluation, includes scapular rows with orange theraband 3x10  Pt demo Good understanding of the education provided. Trudi demonstrated Good return demonstration of activities.     Education provided re:  No spiritual or educational barriers to learning provided    Pt has no cultural, educational or language barriers to learning provided.  Assessment   This is a 51 y.o. female referred to outpatient physical therapy and presents with a medical diagnosis of chronic left shoulder pain and demonstrates limitations as described in the problem list Pt prognosis is Good. Pt will continue to benefit from skilled outpatient physical therapy to address the deficits listed below in the problem list, provide pt/family education and to maximize pt's level  of independence in the home and community environment.     Pt arrived with significant L shoulder pain and tolerated therapeutic exercises fairly secondary to pain. Pt, however, tolerated modalities well. Continue as tolerated at next visit.     Anticipated barriers to physical therapy: none    Pt's spiritual, cultural and educational needs considered and pt agreeable to plan of care and goals as stated below:     GOALS: Short Term Goals:  3 weeks  1. Report decreased left shoulder pain  <   / =  7  /10  to increase tolerance for completion of ADLs  2. Increased MMT of left shoulder flexion to 3+/5 in order to increase tolerance for ADLs and work activities.  3. Increased MMT of left shoulder abduction to 3+/5 in order to increase tolerance for ADLs and work activities.  4. Pt to increase left shoulder flexion range of motion to >/= 150 degrees in order to demonstrate improvements in functional mobility.   5. Pt to tolerate HEP to improve ROM and independence with ADL's    Long Term Goals: 6 weeks  1. Report decreased left shoulder pain  <   / =  6  /10  to increase tolerance for completion of ADLs  2. Increased MMT of left shoulder flexion to 4-/5 in order to increase tolerance for ADLs and work activities.  3. Increased MMT of left shoulder abduction to 4-/5 in order to increase tolerance for ADLs and work activities.  4. Pt to increase left shoulder flexion range of motion to >/= 160 degrees in order to demonstrate improvements in functional mobility.   5. Pt to increase left shoulder abduction range of motion to >/= 120 degrees in order to demonstrate improvements in functional mobility.   6. Pt to be Independent with HEP to improve ROM and independence with ADL's     Plan   Continue with established Plan of Care towards PT goals.   PT/PTA face-to-face:discussed Pt's POC and progress towards established PT goals.    Harlan Lopez, PTA  10/09/2017

## 2017-10-13 ENCOUNTER — CLINICAL SUPPORT (OUTPATIENT)
Dept: REHABILITATION | Facility: HOSPITAL | Age: 52
End: 2017-10-13
Attending: FAMILY MEDICINE
Payer: MEDICAID

## 2017-10-13 DIAGNOSIS — M25.512 CHRONIC LEFT SHOULDER PAIN: Primary | ICD-10-CM

## 2017-10-13 DIAGNOSIS — G89.29 CHRONIC LEFT SHOULDER PAIN: Primary | ICD-10-CM

## 2017-10-13 PROCEDURE — 97110 THERAPEUTIC EXERCISES: CPT

## 2017-10-13 NOTE — PROGRESS NOTES
Physical Therapy Treatment Note     Name: Trudi Howard  [facility] Number: [id number]  Diagnosis:   Encounter Diagnosis   Name Primary?    Chronic left shoulder pain Yes     Physician: Dejon Rollins, *  Treatment Orders: PT Eval and Treat  Past Medical History:   Diagnosis Date    Seizures        Precautions: standard;  Visit #: 7/20  Authorization Period Expiration:  12/31/2017  Referring Provider: Dejon Rollins, *  Subjective   Pt reports:  Has experienced increased pain over the last two days.  Pain Scale: 8/10 L shoulder pain    Objective     Patient received individual therapy to increase strength with 0 other patients with activities as follows:     Trudi received therapeutic exercises to develop strength for 35 minutes including:     Scapular retractions with YTB 2x15  Standing shoulder isometrics into flexion, abduction and extension on wall 2x10 each  Seated LUE ER isometrics with YTB 2x15    Ice to L shoulder x10 min with IFC tens for 30 min during treatment    Written Home Exercises Provided: on initial evaluation, includes scapular rows with orange theraband 3x10  Pt demo Good understanding of the education provided. Trudi demonstrated Good return demonstration of activities.     Education provided re:  No spiritual or educational barriers to learning provided    Pt has no cultural, educational or language barriers to learning provided.  Assessment   This is a 51 y.o. female referred to outpatient physical therapy and presents with a medical diagnosis of chronic left shoulder pain and demonstrates limitations as described in the problem list Pt prognosis is Good. Pt will continue to benefit from skilled outpatient physical therapy to address the deficits listed below in the problem list, provide pt/family education and to maximize pt's level of independence in the home and community environment.     Pt arrived with significant L shoulder pain  and tolerated therapeutic exercises fairly secondary to pain. Pt, however, tolerated modalities well. Continue as tolerated at next visit. Pt remains highly pain dominant and has poor tolerance for gentle isometric exercises. Because of this, she may benefit from further diagnostic imaging at this time.    Anticipated barriers to physical therapy: none    Pt's spiritual, cultural and educational needs considered and pt agreeable to plan of care and goals as stated below:     GOALS: Short Term Goals:  3 weeks  1. Report decreased left shoulder pain  <   / =  7  /10  to increase tolerance for completion of ADLs  2. Increased MMT of left shoulder flexion to 3+/5 in order to increase tolerance for ADLs and work activities.  3. Increased MMT of left shoulder abduction to 3+/5 in order to increase tolerance for ADLs and work activities.  4. Pt to increase left shoulder flexion range of motion to >/= 150 degrees in order to demonstrate improvements in functional mobility.   5. Pt to tolerate HEP to improve ROM and independence with ADL's    Long Term Goals: 6 weeks  1. Report decreased left shoulder pain  <   / =  6  /10  to increase tolerance for completion of ADLs  2. Increased MMT of left shoulder flexion to 4-/5 in order to increase tolerance for ADLs and work activities.  3. Increased MMT of left shoulder abduction to 4-/5 in order to increase tolerance for ADLs and work activities.  4. Pt to increase left shoulder flexion range of motion to >/= 160 degrees in order to demonstrate improvements in functional mobility.   5. Pt to increase left shoulder abduction range of motion to >/= 120 degrees in order to demonstrate improvements in functional mobility.   6. Pt to be Independent with HEP to improve ROM and independence with ADL's     Plan   Continue with established Plan of Care towards PT goals.   PT/PTA face-to-face:discussed Pt's POC and progress towards established PT goals.    Carla Bragg, PT  10/13/2017

## 2017-10-18 ENCOUNTER — CLINICAL SUPPORT (OUTPATIENT)
Dept: REHABILITATION | Facility: HOSPITAL | Age: 52
End: 2017-10-18
Attending: FAMILY MEDICINE
Payer: MEDICAID

## 2017-10-18 DIAGNOSIS — M25.512 CHRONIC LEFT SHOULDER PAIN: Primary | ICD-10-CM

## 2017-10-18 DIAGNOSIS — G89.29 CHRONIC LEFT SHOULDER PAIN: Primary | ICD-10-CM

## 2017-10-18 PROCEDURE — 97110 THERAPEUTIC EXERCISES: CPT

## 2017-10-18 NOTE — PROGRESS NOTES
Physical Therapy Treatment Note     Name: Trudi Howard  [facility] Number: [id number]  Diagnosis:   No diagnosis found.  Physician: Dejon Rollins, *  Treatment Orders: PT Eval and Treat  Past Medical History:   Diagnosis Date    Seizures        Precautions: standard;  Visit #: 7/20  Authorization Period Expiration:  12/31/2017  Referring Provider: Dejon Rollins, *  Subjective   Pt reports: Feels a little better with tens at home.  Pain Scale: 8/10 L shoulder pain    Objective     Patient received individual therapy to increase strength with 0 other patients with activities as follows:     Trudi received therapeutic exercises to develop strength for 50 minutes including:     Scapular retractions with YTB 2x15  Standing shoulder isometrics into flexion, abduction and extension on wall 2x10 each  Seated LUE ER isometrics with YTB 2x15  Pulleys x6 minutes into flexion as tolerated  Supine 1# dowel flexion 3x10 to 90 degrees    Ice to L shoulder x10 min with IFC tens for 50 min during treatment    Written Home Exercises Provided: on initial evaluation, includes scapular rows with orange theraband 3x10  Pt demo Good understanding of the education provided. Trudi demonstrated Good return demonstration of activities.     Education provided re:  No spiritual or educational barriers to learning provided    Pt has no cultural, educational or language barriers to learning provided.  Assessment   This is a 51 y.o. female referred to outpatient physical therapy and presents with a medical diagnosis of chronic left shoulder pain and demonstrates limitations as described in the problem list Pt prognosis is Good. Pt will continue to benefit from skilled outpatient physical therapy to address the deficits listed below in the problem list, provide pt/family education and to maximize pt's level of independence in the home and community environment.     Pt arrived with  significant L shoulder pain and tolerated therapeutic exercises fairly secondary to pain. Pt was able to actively flex to 90 today, and was able to complete pulleys and supine dowel flexion without severe pain today. Pt requires TENS in order to keep pain levels manageable. Pt, however, tolerated modalities well. Continue as tolerated at next visit. Pt remains highly pain dominant and has poor tolerance for gentle isometric exercises. Because of this, she may benefit from further diagnostic imaging at this time.    Anticipated barriers to physical therapy: none    Pt's spiritual, cultural and educational needs considered and pt agreeable to plan of care and goals as stated below:     GOALS: Short Term Goals:  3 weeks  1. Report decreased left shoulder pain  <   / =  7  /10  to increase tolerance for completion of ADLs  2. Increased MMT of left shoulder flexion to 3+/5 in order to increase tolerance for ADLs and work activities.  3. Increased MMT of left shoulder abduction to 3+/5 in order to increase tolerance for ADLs and work activities.  4. Pt to increase left shoulder flexion range of motion to >/= 150 degrees in order to demonstrate improvements in functional mobility.   5. Pt to tolerate HEP to improve ROM and independence with ADL's    Long Term Goals: 6 weeks  1. Report decreased left shoulder pain  <   / =  6  /10  to increase tolerance for completion of ADLs  2. Increased MMT of left shoulder flexion to 4-/5 in order to increase tolerance for ADLs and work activities.  3. Increased MMT of left shoulder abduction to 4-/5 in order to increase tolerance for ADLs and work activities.  4. Pt to increase left shoulder flexion range of motion to >/= 160 degrees in order to demonstrate improvements in functional mobility.   5. Pt to increase left shoulder abduction range of motion to >/= 120 degrees in order to demonstrate improvements in functional mobility.   6. Pt to be Independent with HEP to improve ROM and  independence with ADL's     Plan   Continue with established Plan of Care towards PT goals.   PT/PTA face-to-face:discussed Pt's POC and progress towards established PT goals.    Carla Bragg, PT  10/18/2017

## 2017-10-25 ENCOUNTER — CLINICAL SUPPORT (OUTPATIENT)
Dept: REHABILITATION | Facility: HOSPITAL | Age: 52
End: 2017-10-25
Attending: FAMILY MEDICINE
Payer: MEDICAID

## 2017-10-25 DIAGNOSIS — G89.29 CHRONIC LEFT SHOULDER PAIN: Primary | ICD-10-CM

## 2017-10-25 DIAGNOSIS — M25.512 CHRONIC LEFT SHOULDER PAIN: Primary | ICD-10-CM

## 2017-10-25 PROCEDURE — 97110 THERAPEUTIC EXERCISES: CPT

## 2017-10-25 NOTE — PROGRESS NOTES
Physical Therapy Treatment Note     Name: Trudi Howard  Clinic Number: [id number]  Diagnosis:   No diagnosis found.  Physician: Dejon Rollins, *  Treatment Orders: PT Eval and Treat  Past Medical History:   Diagnosis Date    Seizures        Precautions: standard;  Visit #: 7/20  Authorization Period Expiration:  12/31/2017  Referring Provider: Dejon Rollins, *  Subjective   Pt reports: Using the tens as needed for pain, which is pretty often.  Pain Scale: 8/10 L shoulder pain    Objective     Patient received individual therapy to increase strength with 0 other patients with activities as follows:     Trudi received therapeutic exercises to develop strength for 30 minutes including:     Scapular retractions with YTB 2x15  Standing shoulder isometrics into flexion, abduction and extension on wall 2x10 each NP  Seated LUE ER isometrics with YTB 2x15  Pulleys x6 minutes into flexion as tolerated  Supine 1# dowel flexion 3x10 to 90 degrees (not performed today)    Ice to L shoulder x10 min with IFC tens for 20 min during treatment    Written Home Exercises Provided: on initial evaluation, includes scapular rows with orange theraband 3x10  Pt demo Good understanding of the education provided. Trudi demonstrated Good return demonstration of activities.     Education provided re:  No spiritual or educational barriers to learning provided    Pt has no cultural, educational or language barriers to learning provided.  Assessment   This is a 51 y.o. female referred to outpatient physical therapy and presents with a medical diagnosis of chronic left shoulder pain and demonstrates limitations as described in the problem list Pt prognosis is Good. Pt will continue to benefit from skilled outpatient physical therapy to address the deficits listed below in the problem list, provide pt/family education and to maximize pt's level of independence in the home and  community environment.     Pt arrived with significant L shoulder pain and tolerated therapeutic exercises fairly secondary to pain. Pt was able to actively flex to 90 today, and was able to complete pulleys. However, was unable to complete supine dowel flexion without severe pain today, and thus this was discontinued. Pt requires TENS in order to keep pain levels manageable. Pt, however, tolerated modalities well. Continue as tolerated at next visit. Pt remains highly pain dominant and has poor tolerance for gentle isometric exercises. Because of this, she may benefit from further diagnostic imaging at this time.    Anticipated barriers to physical therapy: none    Pt's spiritual, cultural and educational needs considered and pt agreeable to plan of care and goals as stated below:     GOALS: Short Term Goals:  3 weeks  1. Report decreased left shoulder pain  <   / =  7  /10  to increase tolerance for completion of ADLs  2. Increased MMT of left shoulder flexion to 3+/5 in order to increase tolerance for ADLs and work activities.  3. Increased MMT of left shoulder abduction to 3+/5 in order to increase tolerance for ADLs and work activities.  4. Pt to increase left shoulder flexion range of motion to >/= 150 degrees in order to demonstrate improvements in functional mobility.   5. Pt to tolerate HEP to improve ROM and independence with ADL's    Long Term Goals: 6 weeks  1. Report decreased left shoulder pain  <   / =  6  /10  to increase tolerance for completion of ADLs  2. Increased MMT of left shoulder flexion to 4-/5 in order to increase tolerance for ADLs and work activities.  3. Increased MMT of left shoulder abduction to 4-/5 in order to increase tolerance for ADLs and work activities.  4. Pt to increase left shoulder flexion range of motion to >/= 160 degrees in order to demonstrate improvements in functional mobility.   5. Pt to increase left shoulder abduction range of motion to >/= 120 degrees in order to  demonstrate improvements in functional mobility.   6. Pt to be Independent with HEP to improve ROM and independence with ADL's     Plan   Continue with established Plan of Care towards PT goals.   PT/PTA face-to-face:discussed Pt's POC and progress towards established PT goals.    Carla Bragg, PT  10/25/2017

## 2017-10-26 ENCOUNTER — OFFICE VISIT (OUTPATIENT)
Dept: FAMILY MEDICINE | Facility: HOSPITAL | Age: 52
End: 2017-10-26
Attending: FAMILY MEDICINE
Payer: MEDICAID

## 2017-10-26 VITALS
BODY MASS INDEX: 34.51 KG/M2 | HEIGHT: 69 IN | WEIGHT: 233 LBS | SYSTOLIC BLOOD PRESSURE: 126 MMHG | HEART RATE: 74 BPM | DIASTOLIC BLOOD PRESSURE: 85 MMHG

## 2017-10-26 DIAGNOSIS — M25.512 CHRONIC LEFT SHOULDER PAIN: Primary | ICD-10-CM

## 2017-10-26 DIAGNOSIS — B35.4 TINEA CORPORIS: ICD-10-CM

## 2017-10-26 DIAGNOSIS — G89.29 CHRONIC LEFT SHOULDER PAIN: Primary | ICD-10-CM

## 2017-10-26 PROCEDURE — 99213 OFFICE O/P EST LOW 20 MIN: CPT | Performed by: FAMILY MEDICINE

## 2017-10-26 RX ORDER — CLOTRIMAZOLE AND BETAMETHASONE DIPROPIONATE 10; .64 MG/G; MG/G
CREAM TOPICAL 2 TIMES DAILY
Qty: 15 G | Refills: 0 | Status: SHIPPED | OUTPATIENT
Start: 2017-10-26 | End: 2018-09-13

## 2017-10-26 NOTE — PROGRESS NOTES
"Subjective:       Patient ID: Trudi Howard is a 51 y.o. female.    Chief Complaint: Follow-up    Ms. Howard is a 51 year old female with PMH seizure disorder who presents for follow-up for shoulder pain. Patient reports the pain has not improved with physical therapy and she has had difficulty working with them. ROM is limited due to pain. Patient states the pain is intermittent and in her shoulder and arm, non-radiating. Does remember an incident a few months ago where her shoulder "popped" while she was in bed. Unable to do the exercises at home due to the pain. Not interested in injection at this visit 2/2 not having a ride home.   Denies tobacco, EtOH.       Review of Systems   Constitutional: Negative for chills and fever.   HENT: Negative for congestion and sore throat.    Eyes: Negative for pain and redness.   Respiratory: Negative for cough and shortness of breath.    Cardiovascular: Negative for chest pain and palpitations.   Gastrointestinal: Negative for abdominal pain, diarrhea, nausea and vomiting.   Genitourinary: Negative for difficulty urinating and dysuria.   Musculoskeletal: Positive for myalgias. Negative for arthralgias.   Skin: Negative for rash.   Neurological: Negative for syncope and headaches.   Psychiatric/Behavioral: Negative for agitation and confusion.       Objective:      Vitals:    10/26/17 1544   BP: 126/85   Pulse: 74     Physical Exam   Constitutional: She is oriented to person, place, and time. She appears well-developed and well-nourished. No distress.   HENT:   Head: Normocephalic and atraumatic.   Right Ear: External ear normal.   Left Ear: External ear normal.   Eyes: Conjunctivae are normal. Pupils are equal, round, and reactive to light. Right eye exhibits no discharge. Left eye exhibits no discharge.   Neck: Normal range of motion. Neck supple.   Cardiovascular: Normal rate and regular rhythm.  Exam reveals no gallop and no friction rub.    No murmur " heard.  Pulmonary/Chest: Effort normal and breath sounds normal. No respiratory distress. She has no wheezes. She has no rales.   Abdominal: Soft. Bowel sounds are normal. She exhibits no distension. There is no tenderness.   Musculoskeletal:   Extreme TTP over shoulder joint, patient unable to perform physical exam due to severe pain. Unable to lift arm >70 degrees. Patient would not tolerate passive ROM testing.    Neurological: She is alert and oriented to person, place, and time. She has normal reflexes.   Skin: Skin is warm. No rash noted. No erythema.   Psychiatric: She has a normal mood and affect. Her behavior is normal.   Nursing note and vitals reviewed.      Assessment:       1. Chronic left shoulder pain    2. Tinea corporis        Plan:       Chronic left shoulder pain  -     MRI Upper Extremity Joint W Wo Contrast Left; Future; Expected date: 10/26/2017    Tinea corporis  -     clotrimazole-betamethasone 1-0.05% (LOTRISONE) cream; Apply topically 2 (two) times daily. To area on R leg  Dispense: 15 g; Refill: 0    Patient has failed conservative therapy with ibuprofen and physical therapy. Has worsening physical exam. Will f/u MRI.     Return in about 1 month (around 11/26/2017).

## 2017-10-27 NOTE — PROGRESS NOTES
I assume primary medical responsibility for this patient, I have reviewed the case history, findings, diagnosis and treatment plan with the resident and agree that the care is reasonable and necessary. This service has been performed by a resident without the presence of a teaching physician under the primary care exception  Adriana Ashley  10/27/2017

## 2017-10-31 ENCOUNTER — CLINICAL SUPPORT (OUTPATIENT)
Dept: REHABILITATION | Facility: HOSPITAL | Age: 52
End: 2017-10-31
Attending: FAMILY MEDICINE
Payer: MEDICAID

## 2017-10-31 DIAGNOSIS — G89.29 CHRONIC LEFT SHOULDER PAIN: Primary | ICD-10-CM

## 2017-10-31 DIAGNOSIS — M25.512 CHRONIC LEFT SHOULDER PAIN: Primary | ICD-10-CM

## 2017-10-31 PROCEDURE — 97110 THERAPEUTIC EXERCISES: CPT

## 2017-10-31 NOTE — PROGRESS NOTES
Physical Therapy Treatment Note     Name: Trudi Howard  Clinic Number: [id number]  Diagnosis:   Encounter Diagnosis   Name Primary?    Chronic left shoulder pain Yes     Physician: Dejon Rollins, *  Treatment Orders: PT Eval and Treat  Past Medical History:   Diagnosis Date    Seizures        Precautions: standard;  Visit #: 7/20  Authorization Period Expiration:  12/31/2017  Referring Provider: Dejon Rollins, *  Subjective   Pt reports: Unable to use tens as the unit won't turn on. More pain today than usual. To get MRI on Friday.  Pain Scale: 9/10 L shoulder pain    Objective     Treatment time shortened to 25 min due to patient arriving 35 minutes late today.    Patient received individual therapy to increase strength with 0 other patients with activities as follows:     Trudi received therapeutic exercises to develop strength for 25 minutes including:     Scapular retractions with YTB 2x15  Pulleys x6 minutes into flexion as tolerated    Ice to L shoulder x10 min after treatment    Written Home Exercises Provided: on initial evaluation, includes scapular rows with orange theraband 3x10  Pt demo Good understanding of the education provided. Trudi demonstrated Good return demonstration of activities.     Education provided re:  No spiritual or educational barriers to learning provided    Pt has no cultural, educational or language barriers to learning provided.  Assessment   This is a 51 y.o. female referred to outpatient physical therapy and presents with a medical diagnosis of chronic left shoulder pain and demonstrates limitations as described in the problem list Pt prognosis is Good. Pt will continue to benefit from skilled outpatient physical therapy to address the deficits listed below in the problem list, provide pt/family education and to maximize pt's level of independence in the home and community environment.     Pt arrived with  significant L shoulder pain and tolerated therapeutic exercises fairly secondary to pain. Exercises were cut short due to late arrival and are to continue as tolerated at next visit. Pt presents as highly pain dominant and due to lack of progress thus far, pt may require further assessment by MD.  Continue as tolerated at next visit. Pt remains highly pain dominant and has poor tolerance for gentle isometric exercises. Because of this, she may benefit from further diagnostic imaging at this time.    Anticipated barriers to physical therapy: none    Pt's spiritual, cultural and educational needs considered and pt agreeable to plan of care and goals as stated below:     GOALS: Short Term Goals:  3 weeks  1. Report decreased left shoulder pain  <   / =  7  /10  to increase tolerance for completion of ADLs  2. Increased MMT of left shoulder flexion to 3+/5 in order to increase tolerance for ADLs and work activities.  3. Increased MMT of left shoulder abduction to 3+/5 in order to increase tolerance for ADLs and work activities.  4. Pt to increase left shoulder flexion range of motion to >/= 150 degrees in order to demonstrate improvements in functional mobility.   5. Pt to tolerate HEP to improve ROM and independence with ADL's    Long Term Goals: 6 weeks  1. Report decreased left shoulder pain  <   / =  6  /10  to increase tolerance for completion of ADLs  2. Increased MMT of left shoulder flexion to 4-/5 in order to increase tolerance for ADLs and work activities.  3. Increased MMT of left shoulder abduction to 4-/5 in order to increase tolerance for ADLs and work activities.  4. Pt to increase left shoulder flexion range of motion to >/= 160 degrees in order to demonstrate improvements in functional mobility.   5. Pt to increase left shoulder abduction range of motion to >/= 120 degrees in order to demonstrate improvements in functional mobility.   6. Pt to be Independent with HEP to improve ROM and independence with  ADL's     Plan   Continue with established Plan of Care towards PT goals.   PT/PTA face-to-face:discussed Pt's POC and progress towards established PT goals.    Carla Bragg, PT  10/31/2017

## 2017-11-08 ENCOUNTER — CLINICAL SUPPORT (OUTPATIENT)
Dept: REHABILITATION | Facility: HOSPITAL | Age: 52
End: 2017-11-08
Attending: FAMILY MEDICINE
Payer: MEDICAID

## 2017-11-08 DIAGNOSIS — G89.29 CHRONIC LEFT SHOULDER PAIN: Primary | ICD-10-CM

## 2017-11-08 DIAGNOSIS — M25.512 CHRONIC LEFT SHOULDER PAIN: Primary | ICD-10-CM

## 2017-11-08 PROCEDURE — 97110 THERAPEUTIC EXERCISES: CPT

## 2017-11-08 NOTE — PROGRESS NOTES
Physical Therapy Treatment Note     Name: Trudi Howard  Clinic Number: [id number]  Diagnosis:   No diagnosis found.  Physician: Dejon Rollins, *  Treatment Orders: PT Eval and Treat  Past Medical History:   Diagnosis Date    Seizures        Precautions: standard;  Visit #: 8/20  Authorization Period Expiration:  12/31/2017  Referring Provider: Dejon Rollins, *  Subjective   Pt reports: Unable to get MRI due to claustrophobia, to get MRI on 11/14. Having difficulty sleeping with her shoulder pain.  Pain Scale: 9/10 L shoulder pain    Objective     Patient received individual therapy to increase strength with 0 other patients with activities as follows:     IFC TENS to L shoulder x10 min prior to treatment    Trudi received therapeutic exercises to develop strength for 30 minutes including:     Scapular retractions with YTB 2x15  Isometric L shoulder ER with YTB 3x10  Supine dowel flexion to 30 deg 1x5 (discontinued due to pain)  Pulleys x6 minutes into flexion as tolerated    Ice to L shoulder x10 min after treatment    Written Home Exercises Provided: on initial evaluation, includes scapular rows with orange theraband 3x10  Pt demo Good understanding of the education provided. Trudi demonstrated Good return demonstration of activities.     Education provided re:  No spiritual or educational barriers to learning provided    Pt has no cultural, educational or language barriers to learning provided.  Assessment   This is a 52 y.o. female referred to outpatient physical therapy and presents with a medical diagnosis of chronic left shoulder pain and demonstrates limitations as described in the problem list Pt prognosis is Good. Pt will continue to benefit from skilled outpatient physical therapy to address the deficits listed below in the problem list, provide pt/family education and to maximize pt's level of independence in the home and community  environment.     Pt arrived with significant L shoulder pain and tolerated therapeutic exercises fairly secondary to pain. Unable to tolerate supine flexion, but was able to tolerate pulleys with only mild pain. Pt presents as highly pain dominant and due to lack of progress thus far, pt may require further assessment by MD.  Continue as tolerated at next visit. Pt remains highly pain dominant and has poor tolerance for gentle isometric exercises. Because of this, she may benefit from further diagnostic imaging at this time.    Anticipated barriers to physical therapy: none    Pt's spiritual, cultural and educational needs considered and pt agreeable to plan of care and goals as stated below:     GOALS: Short Term Goals:  3 weeks  1. Report decreased left shoulder pain  <   / =  7  /10  to increase tolerance for completion of ADLs  2. Increased MMT of left shoulder flexion to 3+/5 in order to increase tolerance for ADLs and work activities.  3. Increased MMT of left shoulder abduction to 3+/5 in order to increase tolerance for ADLs and work activities.  4. Pt to increase left shoulder flexion range of motion to >/= 150 degrees in order to demonstrate improvements in functional mobility.   5. Pt to tolerate HEP to improve ROM and independence with ADL's    Long Term Goals: 6 weeks  1. Report decreased left shoulder pain  <   / =  6  /10  to increase tolerance for completion of ADLs  2. Increased MMT of left shoulder flexion to 4-/5 in order to increase tolerance for ADLs and work activities.  3. Increased MMT of left shoulder abduction to 4-/5 in order to increase tolerance for ADLs and work activities.  4. Pt to increase left shoulder flexion range of motion to >/= 160 degrees in order to demonstrate improvements in functional mobility.   5. Pt to increase left shoulder abduction range of motion to >/= 120 degrees in order to demonstrate improvements in functional mobility.   6. Pt to be Independent with HEP to  improve ROM and independence with ADL's     Plan   Continue with established Plan of Care towards PT goals.   PT/PTA face-to-face:discussed Pt's POC and progress towards established PT goals.    Carla Bragg, PT  11/08/2017

## 2017-12-05 ENCOUNTER — DOCUMENTATION ONLY (OUTPATIENT)
Dept: REHABILITATION | Facility: HOSPITAL | Age: 52
End: 2017-12-05

## 2018-01-16 ENCOUNTER — OFFICE VISIT (OUTPATIENT)
Dept: FAMILY MEDICINE | Facility: HOSPITAL | Age: 53
End: 2018-01-16
Payer: MEDICAID

## 2018-01-16 VITALS
HEIGHT: 69 IN | WEIGHT: 250.69 LBS | DIASTOLIC BLOOD PRESSURE: 88 MMHG | BODY MASS INDEX: 37.13 KG/M2 | HEART RATE: 88 BPM | SYSTOLIC BLOOD PRESSURE: 141 MMHG

## 2018-01-16 DIAGNOSIS — M25.9 SHOULDER JOINT DYSFUNCTION: ICD-10-CM

## 2018-01-16 DIAGNOSIS — M67.912 ROTATOR CUFF DISORDER, LEFT: ICD-10-CM

## 2018-01-16 DIAGNOSIS — M25.512 LEFT ANTERIOR SHOULDER PAIN: Primary | ICD-10-CM

## 2018-01-16 PROCEDURE — 99214 OFFICE O/P EST MOD 30 MIN: CPT | Performed by: FAMILY MEDICINE

## 2018-01-16 RX ORDER — IBUPROFEN 600 MG/1
600 TABLET ORAL 3 TIMES DAILY
Qty: 21 TABLET | Refills: 0 | Status: SHIPPED | OUTPATIENT
Start: 2018-01-16 | End: 2018-01-23

## 2018-01-16 NOTE — PROGRESS NOTES
I have reviewed the notes, assessments, and/or procedures performed by Dr. patel, I concur with her/his documentation of Trudi Howard.

## 2018-01-16 NOTE — PROGRESS NOTES
Subjective:       Patient ID: Trudi Howard is a 52 y.o. female.    Chief Complaint: order (need new order for MRI and PT)    Ms. Howard is a 52 year old female with PMH seizure disorder and chronic L shoulder pain who presents for shoulder pain. Patient reports she was unable to get MRI due to claustrophobia and then insurance denied it. Reports continued L shoulder pain, worse in bed and worse with weather. Has not been using ibuprofen or Tylenol. Radiates down to elbow. Intermittent in severity. Possible popping sensation about a year ago with unknown swelling. Denies EtOH/tobacco.       Review of Systems   Constitutional: Negative for chills and fever.   HENT: Negative for ear pain, hearing loss and sore throat.    Eyes: Negative for pain and redness.   Respiratory: Negative for cough and shortness of breath.    Cardiovascular: Negative for chest pain and palpitations.   Gastrointestinal: Negative for diarrhea, nausea and vomiting.   Genitourinary: Negative for difficulty urinating and dysuria.   Musculoskeletal: Negative for arthralgias and myalgias.        L shoulder pain    Skin: Negative for rash.   Neurological: Negative for syncope and headaches.   Psychiatric/Behavioral: Negative for agitation and confusion.       Objective:      Vitals:    01/16/18 1433   BP: (!) 141/88   Pulse: 88     Physical Exam   Constitutional: She is oriented to person, place, and time. She appears well-developed and well-nourished. No distress.   HENT:   Head: Normocephalic and atraumatic.   Right Ear: External ear normal.   Left Ear: External ear normal.   Eyes: Conjunctivae are normal. Pupils are equal, round, and reactive to light. Right eye exhibits no discharge. Left eye exhibits no discharge.   Neck: Normal range of motion. Neck supple.   Cardiovascular: Normal rate and regular rhythm.  Exam reveals no gallop and no friction rub.    No murmur heard.  Pulmonary/Chest: Effort normal and breath sounds normal. No  respiratory distress. She has no wheezes. She has no rales.   Abdominal: Soft. Bowel sounds are normal. She exhibits no distension. There is no tenderness.   Musculoskeletal: Normal range of motion.   Neurological: She is alert and oriented to person, place, and time. She has normal reflexes.   Skin: Skin is warm. No rash noted. No erythema.   Psychiatric: She has a normal mood and affect. Her behavior is normal.       Assessment:       1. Left anterior shoulder pain    2. Shoulder joint dysfunction    3. Rotator cuff disorder, left        Plan:       Left anterior shoulder pain  -     Cancel: MRI Upper Extremity W Wo Contrast Left; Future; Expected date: 01/16/2018  -     ibuprofen (ADVIL,MOTRIN) 600 MG tablet; Take 1 tablet (600 mg total) by mouth 3 (three) times daily.  Dispense: 21 tablet; Refill: 0  -     Ambulatory Referral to Physical/Occupational Therapy  -     MRI Upper Extremity W Wo Contrast Left; Future; Expected date: 01/16/2018    Shoulder joint dysfunction  -     Cancel: MRI Upper Extremity W Wo Contrast Left; Future; Expected date: 01/16/2018  -     ibuprofen (ADVIL,MOTRIN) 600 MG tablet; Take 1 tablet (600 mg total) by mouth 3 (three) times daily.  Dispense: 21 tablet; Refill: 0  -     Ambulatory Referral to Physical/Occupational Therapy  -     MRI Upper Extremity W Wo Contrast Left; Future; Expected date: 01/16/2018    Rotator cuff disorder, left  -     Cancel: MRI Upper Extremity W Wo Contrast Left; Future; Expected date: 01/16/2018  -     ibuprofen (ADVIL,MOTRIN) 600 MG tablet; Take 1 tablet (600 mg total) by mouth 3 (three) times daily.  Dispense: 21 tablet; Refill: 0  -     Ambulatory Referral to Physical/Occupational Therapy  -     MRI Upper Extremity W Wo Contrast Left; Future; Expected date: 01/16/2018    Concern for rotator cuff tear vs. Bursitis vs. Frozen shoulder. F/U MRI. Consider FIT test at next visit in place of colonoscopy as patient desires time to think about it. Encourage  mammogram ordered by ob/gyn.    Follow-up in about 1 month (around 2/16/2018) for After MRI .

## 2018-01-24 ENCOUNTER — TELEPHONE (OUTPATIENT)
Dept: FAMILY MEDICINE | Facility: HOSPITAL | Age: 53
End: 2018-01-24

## 2018-01-25 NOTE — TELEPHONE ENCOUNTER
----- Message from Marybeth Vogel MA sent at 1/16/2018  3:44 PM CST -----  Patient just left.  Said you wanted the name of her surgeon. It is Dr. Martin; phone number [phone].  Thanks.

## 2018-01-26 DIAGNOSIS — G89.29 CHRONIC LEFT SHOULDER PAIN: Primary | ICD-10-CM

## 2018-01-26 DIAGNOSIS — M25.512 CHRONIC LEFT SHOULDER PAIN: Primary | ICD-10-CM

## 2018-01-31 NOTE — PROGRESS NOTES
Name: Trudi Naimashannon  Referring Provider: Dejon Rollins, *  PT Order: PT evaluate and treat  Clinical #: [id number]  Discharge Summary Date: 1/31/2018  Diagnosis:   Encounter Diagnosis   Name Primary?    Chronic left shoulder pain Yes       Patient was seen for 12 OP PT visits from 8/15/2017 to 11/8/2017. Pt missed/no visit 7 scheduled sessions. Treatment included: evaluation, HEP, pt education, aquatic therapy, joint mobilizations, ther ex, and stretching. PT unable to fully assess goal achievement as pt did not return for follow up sessions/did not reschedule follow up visits. This patient is discharged from OP PT Services.    Carla Bragg DPT  1/31/2018

## 2018-02-02 ENCOUNTER — HOSPITAL ENCOUNTER (OUTPATIENT)
Dept: RADIOLOGY | Facility: HOSPITAL | Age: 53
Discharge: HOME | End: 2018-02-02
Attending: FAMILY MEDICINE
Payer: MEDICAID

## 2018-02-02 DIAGNOSIS — M25.512 CHRONIC LEFT SHOULDER PAIN: ICD-10-CM

## 2018-02-02 DIAGNOSIS — G89.29 CHRONIC LEFT SHOULDER PAIN: ICD-10-CM

## 2018-02-02 PROCEDURE — 73221 MRI JOINT UPR EXTREM W/O DYE: CPT | Mod: 26,LT,, | Performed by: RADIOLOGY

## 2018-02-02 PROCEDURE — 73221 MRI JOINT UPR EXTREM W/O DYE: CPT | Mod: TC,LT

## 2018-02-05 ENCOUNTER — OFFICE VISIT (OUTPATIENT)
Dept: FAMILY MEDICINE | Facility: HOSPITAL | Age: 53
End: 2018-02-05
Attending: FAMILY MEDICINE
Payer: MEDICAID

## 2018-02-05 VITALS
BODY MASS INDEX: 38.14 KG/M2 | HEART RATE: 80 BPM | SYSTOLIC BLOOD PRESSURE: 144 MMHG | WEIGHT: 257.5 LBS | DIASTOLIC BLOOD PRESSURE: 80 MMHG | HEIGHT: 69 IN

## 2018-02-05 DIAGNOSIS — Z12.11 ENCOUNTER FOR SCREENING COLONOSCOPY: ICD-10-CM

## 2018-02-05 DIAGNOSIS — K43.9 VENTRAL HERNIA WITHOUT OBSTRUCTION OR GANGRENE: ICD-10-CM

## 2018-02-05 DIAGNOSIS — M67.80 TENDONOSIS: ICD-10-CM

## 2018-02-05 PROCEDURE — 99214 OFFICE O/P EST MOD 30 MIN: CPT | Performed by: FAMILY MEDICINE

## 2018-02-05 RX ORDER — MELOXICAM 15 MG/1
15 TABLET ORAL DAILY
Qty: 30 TABLET | Refills: 2 | Status: SHIPPED | OUTPATIENT
Start: 2018-02-05 | End: 2018-04-10 | Stop reason: ALTCHOICE

## 2018-02-05 NOTE — PROGRESS NOTES
I assume primary medical responsibility for this patient, I have reviewed the case history, findings, diagnosis and treatment plan with the resident and agree that the care is reasonable and necessary. This service has been performed by a resident without the presence of a teaching physician under the primary care exception  Adriana Ashley  2/5/2018

## 2018-02-16 ENCOUNTER — OFFICE VISIT (OUTPATIENT)
Dept: SURGERY | Facility: CLINIC | Age: 53
End: 2018-02-16
Payer: MEDICAID

## 2018-02-16 VITALS
HEIGHT: 69 IN | DIASTOLIC BLOOD PRESSURE: 80 MMHG | WEIGHT: 262 LBS | BODY MASS INDEX: 38.8 KG/M2 | SYSTOLIC BLOOD PRESSURE: 144 MMHG

## 2018-02-16 DIAGNOSIS — K43.2 INCISIONAL HERNIA, WITHOUT OBSTRUCTION OR GANGRENE: Primary | ICD-10-CM

## 2018-02-16 PROCEDURE — 99213 OFFICE O/P EST LOW 20 MIN: CPT | Mod: ,,, | Performed by: SURGERY

## 2018-02-16 PROCEDURE — 3008F BODY MASS INDEX DOCD: CPT | Mod: ,,, | Performed by: SURGERY

## 2018-02-16 NOTE — LETTER
February 17, 2018      Dejon Rollins MD  [address]  [address]  [place] [address]           [facility]  [address]  [address]  [facility] [address]  Phone: [phone]  Fax: [phone]          Patient: Trudi Howard   MR Number: [id number]   YOB: 1965   Date of Visit: 2/16/2018       Dear Dr. Dejon Rollins:    Thank you for referring Trudi Howard to me for evaluation. Attached you will find relevant portions of my assessment and plan of care.    If you have questions, please do not hesitate to call me. I look forward to following Trudi Howard along with you.    Sincerely,    Navid BORJA MD    Enclosure  CC:  No Recipients    If you would like to receive this communication electronically, please contact [email or web address] or [phone] to request more information on [facility] [place] Link access.    For providers and/or their staff who would like to refer a patient to [facility], please contact us through our one-stop-shop provider referral line, [facility], at [phone].    If you feel you have received this communication in error or would no longer like to receive these types of communications, please e-mail [email or web address]

## 2018-04-10 ENCOUNTER — HOSPITAL ENCOUNTER (EMERGENCY)
Facility: HOSPITAL | Age: 53
Discharge: HOME | End: 2018-04-10
Attending: EMERGENCY MEDICINE
Payer: MEDICAID

## 2018-04-10 VITALS
TEMPERATURE: 98 F | HEART RATE: 78 BPM | BODY MASS INDEX: 38.95 KG/M2 | DIASTOLIC BLOOD PRESSURE: 82 MMHG | OXYGEN SATURATION: 95 % | HEIGHT: 69 IN | RESPIRATION RATE: 18 BRPM | WEIGHT: 263 LBS | SYSTOLIC BLOOD PRESSURE: 128 MMHG

## 2018-04-10 DIAGNOSIS — M54.2 CERVICAL PAIN: ICD-10-CM

## 2018-04-10 DIAGNOSIS — S39.012A LUMBAR STRAIN, INITIAL ENCOUNTER: ICD-10-CM

## 2018-04-10 DIAGNOSIS — M54.50 LUMBAR PAIN: ICD-10-CM
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
## 2018-04-10 DIAGNOSIS — M62.830 SPASM OF THORACIC BACK MUSCLE: ICD-10-CM
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
## 2018-04-10 DIAGNOSIS — M25.531 WRIST PAIN, ACUTE, RIGHT: Primary | ICD-10-CM

## 2018-04-10 DIAGNOSIS — S52.501A CLOSED TRAUMATIC DISPLACED FRACTURE OF DISTAL END OF RIGHT RADIUS, INITIAL ENCOUNTER: ICD-10-CM

## 2018-04-10 DIAGNOSIS — S16.1XXA CERVICAL STRAIN, ACUTE, INITIAL ENCOUNTER: ICD-10-CM

## 2018-04-10 PROCEDURE — 99283 EMERGENCY DEPT VISIT LOW MDM: CPT | Mod: 25,,, | Performed by: EMERGENCY MEDICINE

## 2018-04-10 PROCEDURE — 29125 APPL SHORT ARM SPLINT STATIC: CPT | Mod: RT

## 2018-04-10 PROCEDURE — 99284 EMERGENCY DEPT VISIT MOD MDM: CPT | Mod: 25

## 2018-04-10 PROCEDURE — 29125 APPL SHORT ARM SPLINT STATIC: CPT | Mod: RT,,, | Performed by: EMERGENCY MEDICINE

## 2018-04-10 PROCEDURE — 25000003 PHARM REV CODE 250: Performed by: EMERGENCY MEDICINE

## 2018-04-10 PROCEDURE — 63600175 PHARM REV CODE 636 W HCPCS: Performed by: EMERGENCY MEDICINE

## 2018-04-10 PROCEDURE — 96372 THER/PROPH/DIAG INJ SC/IM: CPT | Mod: 59

## 2018-04-10 RX ORDER — ORPHENADRINE CITRATE 100 MG/1
100 TABLET, EXTENDED RELEASE ORAL 2 TIMES DAILY PRN
Qty: 10 TABLET | Refills: 1 | Status: SHIPPED | OUTPATIENT
Start: 2018-04-10 | End: 2018-04-15

## 2018-04-10 RX ORDER — KETOROLAC TROMETHAMINE 30 MG/ML
30 INJECTION, SOLUTION INTRAMUSCULAR; INTRAVENOUS ONCE
Status: COMPLETED | OUTPATIENT
Start: 2018-04-10 | End: 2018-04-10

## 2018-04-10 RX ORDER — HYDROCODONE BITARTRATE AND ACETAMINOPHEN 5; 325 MG/1; MG/1
1 TABLET ORAL EVERY 4 HOURS PRN
Qty: 18 TABLET | Refills: 0 | Status: SHIPPED | OUTPATIENT
Start: 2018-04-10 | End: 2018-05-07 | Stop reason: DRUGHIGH

## 2018-04-10 RX ORDER — HYDROCODONE BITARTRATE AND ACETAMINOPHEN 5; 325 MG/1; MG/1
1 TABLET ORAL
Status: COMPLETED | OUTPATIENT
Start: 2018-04-10 | End: 2018-04-10

## 2018-04-10 RX ORDER — MELOXICAM 15 MG/1
15 TABLET ORAL DAILY
Qty: 7 TABLET | Refills: 0 | Status: SHIPPED | OUTPATIENT
Start: 2018-04-10 | End: 2018-04-17

## 2018-04-10 RX ADMIN — KETOROLAC TROMETHAMINE 30 MG: 30 INJECTION, SOLUTION INTRAMUSCULAR at 07:04

## 2018-04-10 RX ADMIN — HYDROCODONE BITARTRATE AND ACETAMINOPHEN 1 TABLET: 5; 325 TABLET ORAL at 08:04

## 2018-04-10 NOTE — ED NOTES
LOC: The patient is awake, alert, and oriented to place, time, situation. Affect is appropriated.  Speech is appropriate and clear.     APPEARANCE: Patient resting comfortably in no acute distress.  Patient is clean and well groomed.    SKIN: The skin is warm and dry; color consistent with ethnicity.  Patient has normal skin turgor and moist mucus membranes.  Minor abrasions noted to inner aspect of patient right arm; no bleeding; redness noted; no breakdown; bruising noted to inner aspect of RUE.     MUSCULOSKELETAL: Patient moving upper and lower extremities X 4.  Denies weakness. Patient reports pain to palpation in cervical spine, thoracic spine, and lumbar spine. Patient reports pain in right wrist with movement. Patient reporting pain to posterior aspect of right heel with movement. Patient with + ROM to neck and all extremities x 4.     RESPIRATORY: Airway is open and patent. Lungs clear to auscultation in all lobes. Respirations spontaneous, even, easy, and non-labored.  Patient has a normal effort and rate.  No accessory muscle use noted. Denies cough.     CARDIAC:  Normal rhythm and rate noted.  Patient with dependent edema present to BLE.  Capillary refill < 3 seconds. No complaints of chest pain.     ABDOMEN: Soft and non tender to palpation.  No distention noted. Bowel sounds present x 4. Patient morbidly obese.    NEUROLOGIC: PERRLA;  eyes open spontaneously.  Behavior appropriate to situation.  Follows commands; facial expression symmetrical; equal hand grasps bilaterally.  Purposeful motor response noted; normal sensation in all extremities.
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
## 2018-04-11 ENCOUNTER — PES CALL (OUTPATIENT)
Dept: ADMINISTRATIVE | Facility: CLINIC | Age: 53
End: 2018-04-11

## 2018-04-11 NOTE — DISCHARGE INSTRUCTIONS
Please drink plenty of fluids.  Please get plenty of rest.  Please return here or go to the Emergency Department for any concerns or worsening of condition.  If you were prescribed a narcotic medication, do not drive or operate heavy equipment or machinery while taking these medications.  If you were not prescribed an anti-inflammatory medication, and if you do not have any history of stomach/intestinal ulcers, or kidney disease, or are not taking a blood thinner such as Coumadin, Plavix, Pradaxa, Eloquis, or Xaralta for example, it is OK to take over the counter Ibuprofen or Advil or Motrin or Aleve as directed.  Do not take these medications on an empty stomach.  Rest, ice, compression and elevation to the affected joint or limb as needed.  Please follow up with your primary care doctor or specialist as needed.    If you  smoke, please stop smoking.    Please drink plenty of fluids.  Please get plenty of rest.  Please return here or go to the Emergency Department for any concerns or worsening of condition.  If you were prescribed a narcotic medication, do not drive or operate heavy equipment or machinery while taking these medications.  If you were not prescribed an anti-inflammatory medication, and if you do not have any history of stomach/intestinal ulcers, or kidney disease, or are not taking a blood thinner such as Coumadin, Plavix, Pradaxa, Eloquis, or Xaralta for example, it is OK to take over the counter Ibuprofen or Advil or Motrin or Aleve as directed.  Do not take these medications on an empty stomach.  If you lose control of your bowel and/or bladder, please go to the nearest Emergency Department immediately.  If you lose sensation in between your legs by your genitalia and/or rectum, please go to the nearest Emergency Department immediately.  If you lose control or sensation of any extremity, please go to the nearest Emergency Department immediately.  Please follow up with your primary care doctor or  specialist as needed.    If you  smoke, please stop smoking.
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
## 2018-04-16 ENCOUNTER — HOSPITAL ENCOUNTER (OUTPATIENT)
Dept: RADIOLOGY | Facility: HOSPITAL | Age: 53
Discharge: HOME | End: 2018-04-16
Attending: ORTHOPAEDIC SURGERY
Payer: MEDICAID

## 2018-04-16 ENCOUNTER — OFFICE VISIT (OUTPATIENT)
Dept: ORTHOPEDICS | Facility: CLINIC | Age: 53
End: 2018-04-16
Payer: MEDICAID

## 2018-04-16 VITALS — HEIGHT: 69 IN | BODY MASS INDEX: 38.95 KG/M2 | WEIGHT: 263 LBS

## 2018-04-16 DIAGNOSIS — S62.101A CLOSED FRACTURE OF RIGHT WRIST, INITIAL ENCOUNTER: ICD-10-CM

## 2018-04-16 DIAGNOSIS — M25.531 RIGHT WRIST PAIN: ICD-10-CM

## 2018-04-16 DIAGNOSIS — M25.531 RIGHT WRIST PAIN: Primary | ICD-10-CM

## 2018-04-16 PROCEDURE — 99203 OFFICE O/P NEW LOW 30 MIN: CPT | Mod: 25,S$PBB,, | Performed by: ORTHOPAEDIC SURGERY

## 2018-04-16 PROCEDURE — 99213 OFFICE O/P EST LOW 20 MIN: CPT | Mod: PBBFAC,25,PO | Performed by: ORTHOPAEDIC SURGERY

## 2018-04-16 PROCEDURE — 73110 X-RAY EXAM OF WRIST: CPT | Mod: 26,RT,, | Performed by: RADIOLOGY

## 2018-04-16 PROCEDURE — 29075 APPL CST ELBW FNGR SHORT ARM: CPT | Mod: PBBFAC,PO | Performed by: ORTHOPAEDIC SURGERY

## 2018-04-16 PROCEDURE — 99999 PR PBB SHADOW E&M-EST. PATIENT-LVL III: CPT | Mod: PBBFAC,,, | Performed by: ORTHOPAEDIC SURGERY

## 2018-04-16 PROCEDURE — 29075 APPL CST ELBW FNGR SHORT ARM: CPT | Mod: S$PBB,RT,, | Performed by: ORTHOPAEDIC SURGERY

## 2018-04-16 PROCEDURE — 73110 X-RAY EXAM OF WRIST: CPT | Mod: TC,FY,RT

## 2018-04-16 RX ORDER — HYDROCODONE BITARTRATE AND ACETAMINOPHEN 7.5; 325 MG/1; MG/1
1 TABLET ORAL EVERY 4 HOURS PRN
Qty: 40 TABLET | Refills: 0 | Status: SHIPPED | OUTPATIENT
Start: 2018-04-16 | End: 2018-09-13

## 2018-04-16 NOTE — LETTER
April 16, 2018        Dejon Rollins MD  [address]  [address]  [facility] [address]             [facility]  [address]  [facility] [address]  Phone: [phone]   Patient: Trudi Howard   MR Number: [id number]   YOB: 1965   Date of Visit: 4/16/2018       Dear Dr. Rollins:    Thank you for referring Trudi Howard to me for evaluation. Below are the relevant portions of my assessment and plan of care.            If you have questions, please do not hesitate to call me. I look forward to following Trudi along with you.    Sincerely,      Kenneth Tang Jr., MD           CC  No Recipients
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
## 2018-05-07 ENCOUNTER — OFFICE VISIT (OUTPATIENT)
Dept: ORTHOPEDICS | Facility: CLINIC | Age: 53
End: 2018-05-07
Payer: MEDICAID

## 2018-05-07 ENCOUNTER — HOSPITAL ENCOUNTER (OUTPATIENT)
Dept: RADIOLOGY | Facility: HOSPITAL | Age: 53
Discharge: HOME | End: 2018-05-07
Attending: ORTHOPAEDIC SURGERY
Payer: MEDICAID

## 2018-05-07 VITALS — WEIGHT: 263 LBS | HEIGHT: 69 IN | BODY MASS INDEX: 38.95 KG/M2

## 2018-05-07 DIAGNOSIS — M25.539 PAIN IN WRIST, UNSPECIFIED LATERALITY: Primary | ICD-10-CM

## 2018-05-07 DIAGNOSIS — M25.539 PAIN IN WRIST, UNSPECIFIED LATERALITY: ICD-10-CM

## 2018-05-07 DIAGNOSIS — S62.101D CLOSED FRACTURE OF RIGHT WRIST WITH ROUTINE HEALING, SUBSEQUENT ENCOUNTER: ICD-10-CM

## 2018-05-07 PROCEDURE — 73100 X-RAY EXAM OF WRIST: CPT | Mod: TC,FY,RT

## 2018-05-07 PROCEDURE — 99213 OFFICE O/P EST LOW 20 MIN: CPT | Mod: S$PBB,,, | Performed by: ORTHOPAEDIC SURGERY

## 2018-05-07 PROCEDURE — 73100 X-RAY EXAM OF WRIST: CPT | Mod: 26,RT,, | Performed by: RADIOLOGY

## 2018-05-07 PROCEDURE — 99999 PR PBB SHADOW E&M-EST. PATIENT-LVL III: CPT | Mod: PBBFAC,,, | Performed by: ORTHOPAEDIC SURGERY

## 2018-05-07 PROCEDURE — 99213 OFFICE O/P EST LOW 20 MIN: CPT | Mod: PBBFAC,25,PO | Performed by: ORTHOPAEDIC SURGERY
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
## 2018-05-28 ENCOUNTER — OFFICE VISIT (OUTPATIENT)
Dept: ORTHOPEDICS | Facility: CLINIC | Age: 53
End: 2018-05-28
Payer: MEDICAID

## 2018-05-28 ENCOUNTER — HOSPITAL ENCOUNTER (OUTPATIENT)
Dept: RADIOLOGY | Facility: HOSPITAL | Age: 53
Discharge: HOME | End: 2018-05-28
Attending: ORTHOPAEDIC SURGERY
Payer: MEDICAID

## 2018-05-28 VITALS — HEIGHT: 69 IN | BODY MASS INDEX: 38.95 KG/M2 | WEIGHT: 263 LBS

## 2018-05-28 DIAGNOSIS — M25.531 RIGHT WRIST PAIN: Primary | ICD-10-CM

## 2018-05-28 DIAGNOSIS — M25.531 RIGHT WRIST PAIN: ICD-10-CM

## 2018-05-28 DIAGNOSIS — S62.101D CLOSED FRACTURE OF RIGHT WRIST WITH ROUTINE HEALING, SUBSEQUENT ENCOUNTER: ICD-10-CM

## 2018-05-28 PROCEDURE — 73100 X-RAY EXAM OF WRIST: CPT | Mod: TC,FY,RT

## 2018-05-28 PROCEDURE — 99999 PR PBB SHADOW E&M-EST. PATIENT-LVL III: CPT | Mod: PBBFAC,,, | Performed by: ORTHOPAEDIC SURGERY

## 2018-05-28 PROCEDURE — 99213 OFFICE O/P EST LOW 20 MIN: CPT | Mod: PBBFAC,25,PO | Performed by: ORTHOPAEDIC SURGERY

## 2018-05-28 PROCEDURE — 73100 X-RAY EXAM OF WRIST: CPT | Mod: 26,RT,, | Performed by: RADIOLOGY

## 2018-05-28 PROCEDURE — 99213 OFFICE O/P EST LOW 20 MIN: CPT | Mod: S$PBB,,, | Performed by: ORTHOPAEDIC SURGERY
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
## 2018-06-11 ENCOUNTER — CLINICAL SUPPORT (OUTPATIENT)
Dept: REHABILITATION | Facility: HOSPITAL | Age: 53
End: 2018-06-11
Attending: ORTHOPAEDIC SURGERY
Payer: MEDICAID

## 2018-06-11 DIAGNOSIS — R53.1 WEAKNESS: ICD-10-CM

## 2018-06-11 PROCEDURE — 97165 OT EVAL LOW COMPLEX 30 MIN: CPT
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
## 2018-06-22 ENCOUNTER — CLINICAL SUPPORT (OUTPATIENT)
Dept: REHABILITATION | Facility: HOSPITAL | Age: 53
End: 2018-06-22
Attending: ORTHOPAEDIC SURGERY
Payer: MEDICAID

## 2018-06-22 DIAGNOSIS — R53.1 WEAKNESS: ICD-10-CM

## 2018-06-22 PROCEDURE — 97150 GROUP THERAPEUTIC PROCEDURES: CPT

## 2018-06-22 NOTE — PROGRESS NOTES
Occupational Therapy Daily Treatment Note     Name: Trudi Howard  Clinic Number: [id number]    Therapy Diagnosis:   Encounter Diagnosis   Name Primary?    Weakness      Physician: Kenneth Tang Jr., *    Physician Orders: Eval and Treat; PROM and AROM  Medical Diagnosis:   M25.531 (ICD-10-CM) - Right wrist pain   S62.101D (ICD-10-CM) - Closed fracture of right wrist with routine healing, subsequent encounter        Surgical Procedure and Date: N/A,   Evaluation Date: 6/11/2018  Insurance Authorization Period Expiration: 12/31/2018, charge as TA  Plan of Care Certification Period:  6/11/2018 - 7/11/2018     Date of Return to MD: 7/2/2018    Visit # / Visits authorized: 2/ 20  Time In:10:30  Time Out: 11:20  Total Billable Time: 50 minutes    Precautions: Standard    Subjective     Pt reports that she has not been exercising her hand. Pt reports that she is only able to attend therapy 1 x weekly due to transportation issues.  Response to previous treatment:: Pt limited last session by time constraints  Functional change: none    Pain: 6/10  Location: right wrist      Objective     Observation/Appearance: Pt presents to therapy wearing wrist brace.     Edema. Measured in centimeters.    6/11/2018 6/11/2018     Left Right   Wrist Crease 18 19.7   Midpalm 20.9 22   MCPs 20 21.4      Edema. Measured in centimeters.    6/11/2018 6/11/2018     Left Right   Index:         P1 6.7 7.4   Long:         P1 6.6 7.2   Ring:         P1            6.7 7   Small:          P1           6.1 6.3   Thumb:       Prox. Phalanx 7.2 7.2      Elbow and Wrist ROM. Measured in degrees.    6/11/2018 6/11/2018     Left Right   Elbow Ext/Flex WNL WNL   Supination/Pronation 50/90 50/90   Wrist Ext/Flex 70/65 36/46   Wrist RD/UD 25/60 10/26      Hand ROM. Measured in degrees.    6/11/2018 6/11/2018     Left Right           Index: MP  77 50              PIP     89 79              DIP 70 40              DURAN 236 169            Long:  MP 80 44              PIP 86 84              DIP 80 48              DURAN 246 176           Ring:   MP 79 40              PIP 90 86              DIP 76 64              DURAN 245 190           Small:  MP 77 47               PIP 88 78               DIP 85 70              DURAN 250 195           Thumb: MP 45 26                IP 66 44       Rad ADD/ABD 65 65       Pal ADD/ABD 55 60          Opposition To distal palmar crease To mid distal phalanx of SF       Strength (Dyanmometer) and Pinch Strength (Pinch Gauge)  Measured in pounds.    6/11/2018 6/11/2018     Left Right   Rung II defer defer   Key Pinch       3pt Pinch       2pt Pinch          Sensation: Pt reports that her wrist and hand are highly sensitive to touch. Negative for numbness and tingling.     CMS Impairment/Limitation/Restriction for FOTO Survey  Therapist reviewed FOTO scores for Trudi Howard on 6/11/2018.   FOTO documents entered into [facility] - see Media section.     FOTO assessment not completed at initial eval due to time constraints. Pt arrived late for evaluation and was difficult to redirect.     Limitation Score: TBD%  Category: Carrying     Current : TBD  Goal: TBD  Discharge: TBD          Trudi received activities for 50 minutes including: fluidotherapy, RM and finger, thumb, wrist and forearm AROM as listed in the media section x 10 reps each.       Home Exercises and Education Provided     Education provided:   - Written home exercise program as listed in the media section.  - Progress towards goals     Written Home Exercises Provided: yes.  Exercises were reviewed and Trudi was able to demonstrate them prior to the end of the session.  Trudi demonstrated good  understanding of the education provided.     Pt received a written copy of exercises to perform at home.   See EMR under patient instructions for exercises given.        Assessment     Pt would continue to benefit from skilled OT. Pt tolerated all exercises with mild c/o pain.  She did need moderate verbal cuing and redirecting to stay on task.      Trudi is progressing well towards her goals and there are no updates to goals at this time. Pt prognosis is Good.     Pt will continue to benefit from skilled outpatient occupational therapy to address the deficits listed in the problem list on initial evaluation provide pt/family education and to maximize pt's level of independence in the home and community environment.     Anticipated barriers to occupational therapy: attention span    Pt's spiritual, cultural and educational needs considered and pt agreeable to plan of care and goals.    Goals:  Long Term Goals (LTGs); to be met by discharge.  LTG #1: Pt will report a pain level of 1 out of 10 with activity.             LTG #2: Pt will demo improved FOTO score by reaching CK G-Code.   LTG #3: Pt will return to prior level of function for ADLs and household management.      Short Term Goals (STGs); to be met within 4 weeks (7/11/2018).  STG #1a: Pt will report 2 out of 10 pain level at rest.  STG #2a: Pt will report/demo [place] with cooking.  STG #2b: Pt will report/demo [place] with bathing and grooming.  STG #3a: Pt will demonstrate independence with issued HEP.   STG #3b: Pt will demo improved wrist flexion and extension by 15 degrees each, needed to aid with dressing.    Plan   Pt to be treated by Occupational Therapy 2 times per week for 4 weeks during the certification period from 6/11/2018 to 7/11/2018 to achieve the established goals.      Discussed Plan of Care with patient: Yes  Updates/Grading for next session: yellow theraputty      MARTHA Del Rio CHT

## 2018-06-22 NOTE — PATIENT INSTRUCTIONS
"[facility]  OCCUPATIONAL THERAPY  HOME EXERCISE PROGRAM     Complete the following massages for 2 minutes each, 2x/day.                       Complete the following exercises with 10 repetitions each, 4-6x/day.     AROM: DIP Flexion / Extension  Pinch middle knuckle to prevent bending.   Bend end knuckle until stretch is felt. Hold 3   seconds. Relax. Straighten finger as far as possible.      AROM: PIP Flexion / Extension  Pinch bottom knuckle  to prevent bending.   Actively bend middle knuckle until stretch is felt.   Hold 3 seconds. Relax. Straighten finger as far as possible.          AROM: Isolated MCP Flexion / Extension ("Wave")   Bend only your large, bottom knuckles. Hold 3 seconds.   Keep the tips of your fingers straight. Straighten fingers.      AROM: Isolated IPJ Flexion / Extension ("Hook")  Bend only your middle and end knuckles. Hold 3 seconds.   Straighten your fingers.       AROM: MCP and PIP Flexion / Extension ("Straight Fist")  Bend your bottom and middle knuckles, keeping the tips of your  fingers straight. Try to touch the pads of your fingers on your palm.   Hold 3 seconds. Straighten your fingers.       AROM: Composite Flexion / Extension ("Full Fist")  Bend every joint in your hand into a fist. Hold 3 seconds.   Straighten your fingers.       AROM: Composte Extension ("Finger Lifts")  Lift your finger off of the table one at a time. Hold 3 seconds.   Relax your finger.      AROM: Abduction / Adduction  With hand flat on table, spread all fingers apart,   then bring them together as close as possible.                 AROM: Supination / Pronation   With your elbow by your side, turn your palm up   then turn your palm down.       AROM: Wrist Flexion / Extension               Bend your wrist forward and back as far as possible.        AROM: Wrist Radial / Ulnar Deviation  Bend your wrist from side to side as far as possible.      AROM: Wrist Flexion / Extension  Make a fist, " then bend your wrist forward then back as far as possible.           AROM: Wrist Radial / Ulnar Deviation   Make a fist then bend your wrist toward your body, then away.

## 2018-06-29 ENCOUNTER — CLINICAL SUPPORT (OUTPATIENT)
Dept: REHABILITATION | Facility: HOSPITAL | Age: 53
End: 2018-06-29
Attending: ORTHOPAEDIC SURGERY
Payer: MEDICAID

## 2018-06-29 DIAGNOSIS — R53.1 WEAKNESS: ICD-10-CM

## 2018-06-29 PROCEDURE — 97150 GROUP THERAPEUTIC PROCEDURES: CPT

## 2018-06-29 NOTE — PROGRESS NOTES
Occupational Therapy Daily Treatment Note     Name: Trudi Howard  Clinic Number: [id number]    Therapy Diagnosis:   Encounter Diagnosis   Name Primary?    Weakness      Physician: Kenneth Tang Jr., *    Physician Orders: Eval and Treat; PROM and AROM  Medical Diagnosis:   M25.531 (ICD-10-CM) - Right wrist pain   S62.101D (ICD-10-CM) - Closed fracture of right wrist with routine healing, subsequent encounter        Surgical Procedure and Date: N/A,   Evaluation Date: 6/11/2018  Insurance Authorization Period Expiration: 12/31/2018, charge as TA  Plan of Care Certification Period:  6/11/2018 - 7/11/2018     Date of Return to MD: 7/2/2018    Visit # / Visits authorized: 3/ 20  Time In:10:45  Time Out: 11:45  Total Billable Time: 60 minutes    Precautions: Standard    Subjective     Pt reports that she has not been exercising her hand as instructed. Pt reports that she is only able to attend therapy 1 x weekly due to transportation issues.  Response to previous treatment:: Pt limited last session by time constraints  Functional change: none    Pain: 6/10  Location: right wrist      Objective     Observation/Appearance: Pt presents to therapy wearing wrist brace.     Edema. Measured in centimeters.    6/11/2018 6/11/2018 6/29/2018     Left Right Right   Wrist Crease 18 19.7 19.5   Midpalm 20.9 22 22   MCPs 20 21.4 21.4      Edema. Measured in centimeters.    6/11/2018 6/11/2018 6/29/2018     Left Right Right   Index:          P1 6.7 7.4 7.4   Long:          P1 6.6 7.2 7.2   Ring:          P1            6.7 7 7   Small:           P1           6.1 6.3 6.3   Thumb:        Prox. Phalanx 7.2 7.2 7.2      Elbow and Wrist ROM. Measured in degrees.    6/11/2018 6/11/2018 6/29/2018     Left Right Right   Elbow Ext/Flex WNL WNL WNL   Supination/Pronation 50/90 50/90 60/90   Wrist Ext/Flex 70/65 36/46 50/40   Wrist RD/UD 25/60 10/26 10/25      Hand ROM. Measured in degrees.    6/11/2018 6/11/2018  6/29/2018     Left Right Right            Index: MP  77 50 50              PIP     89 79 80              DIP 70 40 45              DURAN 236 169 175            Long:  MP 80 44 47              PIP 86 84 80              DIP 80 48 50              DURAN 246 176 177            Ring:   MP 79 40 40              PIP 90 86 80              DIP 76 64 64              DURAN 245 190 184            Small:  MP 77 47 35               PIP 88 78 78               DIP 85 70 70              DURAN 250 195 183            Thumb: MP 45 26 45                IP 66 44 45       Rad ADD/ABD 65 65 65       Pal ADD/ABD 55 60 60          Opposition To distal palmar crease To mid distal phalanx of SF 3cm       Strength (Dyanmometer) and Pinch Strength (Pinch Gauge)  Measured in pounds.    6/11/2018 6/11/2018     Left Right   Rung II defer defer   Key Pinch       3pt Pinch       2pt Pinch          Sensation: Pt reports that her wrist and hand are highly sensitive to touch. Negative for numbness and tingling.     CMS Impairment/Limitation/Restriction for FOTO Survey  Therapist reviewed FOTO scores for Trudi oHward on 6/11/2018.   FOTO documents entered into [employer] - see Media section.     FOTO assessment not completed at initial eval due to time constraints. Pt arrived late for evaluation and was difficult to redirect.     Limitation Score: TBD%  Category: Carrying     Current : TBD  Goal: TBD  Discharge: TBD          Trudi received activities for 60 minutes including: fluidotherapy, RM and finger, thumb, wrist and forearm AROM as listed in the media section x 10 reps each. Pt then performed wrist wheel for wrist flex/ext, sup/pron x 3 min, wrist maze x 3 min and 1 container of pom poms nesting.      Home Exercises and Education Provided     Education provided:   - Written home exercise program as listed in the media section.  - Progress towards goals     Written Home Exercises Provided: yes.  Exercises were reviewed and Trudi was able to demonstrate them  prior to the end of the session.  Trudi demonstrated good  understanding of the education provided.     Pt received a written copy of exercises to perform at home.   See EMR under patient instructions for exercises given.        Assessment     Pt would continue to benefit from skilled OT. Pt tolerated all exercises with mild c/o pain. She did need moderate verbal cuing and redirecting to stay on task. Pt exibited mild improvements with hand and wrist AROM. Edema continues to be moderate. Pt instructed on importance of HEP. Pt verbalized good understanding and stated that she will be more compliant.    Trudi is progressing well towards her goals and there are no updates to goals at this time. Pt prognosis is Good.     Pt will continue to benefit from skilled outpatient occupational therapy to address the deficits listed in the problem list on initial evaluation provide pt/family education and to maximize pt's level of independence in the home and community environment.     Anticipated barriers to occupational therapy: attention span    Pt's spiritual, cultural and educational needs considered and pt agreeable to plan of care and goals.    Goals:  Long Term Goals (LTGs); to be met by discharge.  LTG #1: Pt will report a pain level of 1 out of 10 with activity.             LTG #2: Pt will demo improved FOTO score by reaching CK G-Code.   LTG #3: Pt will return to prior level of function for ADLs and household management.      Short Term Goals (STGs); to be met within 4 weeks (7/11/2018).  STG #1a: Pt will report 2 out of 10 pain level at rest.  STG #2a: Pt will report/demo [place] with cooking.  STG #2b: Pt will report/demo [place] with bathing and grooming.  STG #3a: Pt will demonstrate independence with issued HEP.   STG #3b: Pt will demo improved wrist flexion and extension by 15 degrees each, needed to aid with dressing.    Plan: Cont with OT   Pt to be treated by Occupational Therapy 2 times per week for 4  weeks during the certification period from 6/11/2018 to 7/11/2018 to achieve the established goals.      Discussed Plan of Care with patient: Yes  Updates/Grading for next session: yellow theraputty      MARTHA Del Rio CHT

## 2018-06-29 NOTE — PATIENT INSTRUCTIONS
"[facility]  OCCUPATIONAL THERAPY  HOME EXERCISE PROGRAM         Complete the following exercises with 10 repetitions each, 4-6x/day.     AROM: DIP Flexion / Extension  Pinch middle knuckle to prevent bending.   Bend end knuckle until stretch is felt. Hold 3   seconds. Relax. Straighten finger as far as possible.      AROM: PIP Flexion / Extension  Pinch bottom knuckle  to prevent bending.   Actively bend middle knuckle until stretch is felt.   Hold 3 seconds. Relax. Straighten finger as far as possible.      AROM: Isolated PIP Flexion  Bend only middle joint of your finger,   keeping other fingers straight with other hand.      AROM: Isolated MCP Flexion / Extension ("Wave")   Bend only your large, bottom knuckles. Hold 3 seconds.   Keep the tips of your fingers straight. Straighten fingers.      AROM: Isolated IPJ Flexion / Extension ("Hook")  Bend only your middle and end knuckles. Hold 3 seconds.   Straighten your fingers.       AROM: MCP and PIP Flexion / Extension ("Straight Fist")  Bend your bottom and middle knuckles, keeping the tips of your  fingers straight. Try to touch the pads of your fingers on your palm.   Hold 3 seconds. Straighten your fingers.       AROM: Composite Flexion / Extension ("Full Fist")  Bend every joint in your hand into a fist. Hold 3 seconds.   Straighten your fingers.       AROM: Composte Extension ("Finger Lifts")  Lift your finger off of the table one at a time. Hold 3 seconds.   Relax your finger.      AROM: Abduction / Adduction  With hand flat on table, spread all fingers apart,   then bring them together as close as possible.                   AROM: Supination / Pronation   With your elbow by your side, turn your palm up   then turn your palm down.       AROM: Wrist Flexion / Extension               Bend your wrist forward and back as far as possible.        AROM: Wrist Radial / Ulnar Deviation  Bend your wrist from side to side as far as possible.      AROM: " Wrist Flexion / Extension  Make a fist, then bend your wrist forward then back as far as possible.           AROM: Wrist Radial / Ulnar Deviation   Make a fist then bend your wrist toward your body, then away.                                                                   MARTHA Del Rio, JET  Certified Hand Therapist  Occupational Therapist

## 2018-07-02 ENCOUNTER — HOSPITAL ENCOUNTER (OUTPATIENT)
Dept: RADIOLOGY | Facility: HOSPITAL | Age: 53
Discharge: HOME | End: 2018-07-02
Attending: ORTHOPAEDIC SURGERY
Payer: MEDICAID

## 2018-07-02 ENCOUNTER — OFFICE VISIT (OUTPATIENT)
Dept: ORTHOPEDICS | Facility: CLINIC | Age: 53
End: 2018-07-02
Payer: MEDICAID

## 2018-07-02 VITALS — HEIGHT: 69 IN | WEIGHT: 263 LBS | BODY MASS INDEX: 38.95 KG/M2

## 2018-07-02 DIAGNOSIS — M25.531 RIGHT WRIST PAIN: ICD-10-CM

## 2018-07-02 DIAGNOSIS — M25.531 RIGHT WRIST PAIN: Primary | ICD-10-CM

## 2018-07-02 DIAGNOSIS — S62.101D CLOSED FRACTURE OF RIGHT WRIST WITH ROUTINE HEALING, SUBSEQUENT ENCOUNTER: ICD-10-CM

## 2018-07-02 PROCEDURE — 99213 OFFICE O/P EST LOW 20 MIN: CPT | Mod: PBBFAC,25,PO | Performed by: ORTHOPAEDIC SURGERY

## 2018-07-02 PROCEDURE — 73100 X-RAY EXAM OF WRIST: CPT | Mod: 26,RT,, | Performed by: RADIOLOGY

## 2018-07-02 PROCEDURE — 99213 OFFICE O/P EST LOW 20 MIN: CPT | Mod: S$PBB,,, | Performed by: ORTHOPAEDIC SURGERY

## 2018-07-02 PROCEDURE — 73100 X-RAY EXAM OF WRIST: CPT | Mod: TC,FY,RT

## 2018-07-02 PROCEDURE — 99999 PR PBB SHADOW E&M-EST. PATIENT-LVL III: CPT | Mod: PBBFAC,,, | Performed by: ORTHOPAEDIC SURGERY

## 2018-07-02 NOTE — PROGRESS NOTES
HISTORY OF PRESENT ILLNESS:  Ms. Howard in followup of right distal radius   fracture.  She is about two and a half months out from injury.  She is doing a   little bit better, but still having some pain and stiffness.  She is currently   in therapy.    PHYSICAL EXAMINATION:  RIGHT HAND AND WRIST:  The wrist is slightly swollen, diffusely tender.  Range   of motion of fingers and wrist limited.   strength decreased.  Sensation   intact.    X-RAYS:  AP and lateral right hand healed distal radius fracture, good position,   nondisplaced.    PLAN:  I am still concerned about the stiffness.  I have encouraged her to   discontinue the brace all times including home use, maybe just wear it for heavy   lifting outside of the house, but she is not driving yet.  I have encouraged   her to get back to driving and using her right hand more and more, continue   therapy, try some warm water soaks with a sponge and I have given her some   tramadol for pain in addition to Advil or Motrin.  Follow up in 3-4 weeks.      KYLEE  dd: 07/02/2018 15:51:59 (CDT)  td: 07/03/2018 13:15:42 (LEENA)  Doc ID   #[id number]  Job ID #[id number]    CC:

## 2018-07-06 ENCOUNTER — CLINICAL SUPPORT (OUTPATIENT)
Dept: REHABILITATION | Facility: HOSPITAL | Age: 53
End: 2018-07-06
Attending: ORTHOPAEDIC SURGERY
Payer: MEDICAID

## 2018-07-06 DIAGNOSIS — R53.1 WEAKNESS: ICD-10-CM

## 2018-07-06 PROCEDURE — G8984 CARRY CURRENT STATUS: HCPCS | Mod: CL

## 2018-07-06 PROCEDURE — 97530 THERAPEUTIC ACTIVITIES: CPT

## 2018-07-06 PROCEDURE — G8985 CARRY GOAL STATUS: HCPCS | Mod: CK

## 2018-07-06 PROCEDURE — 97150 GROUP THERAPEUTIC PROCEDURES: CPT

## 2018-07-06 NOTE — PROGRESS NOTES
Occupational Therapy Daily Treatment Note     Name: Trudi Howard  Clinic Number: [id number]    Therapy Diagnosis:   Encounter Diagnosis   Name Primary?    Weakness      Physician: Kenneth Tang Jr., *    Physician Orders: Eval and Treat; PROM and AROM  Medical Diagnosis:   M25.531 (ICD-10-CM) - Right wrist pain   S62.101D (ICD-10-CM) - Closed fracture of right wrist with routine healing, subsequent encounter        Surgical Procedure and Date: N/A,   Evaluation Date: 6/11/2018  Insurance Authorization Period Expiration: 12/31/2018, charge as TA  Plan of Care Certification Period:  6/11/2018 - 7/11/2018     Date of Return to MD: 7/30/2018    Imaging 7/2/2018: X-RAYS:  AP and lateral right hand healed distal radius fracture, good position,   nondisplaced.    Visit # / Visits authorized: 4/ 20  Time In:10:30  Time Out: 11:30  Total Billable Time: 60 minutes  Charges: 4 TA    Precautions: Standard    Subjective     Pt reports that she exercising about twice a day. Pt also reports that her physician stated that she was 100% healed and to d/c brace. Pt still wearing brace. She states that it hurts to ride in the car because of the bumps.  Response to previous treatment:: Pt reports that she was sore.  Functional change: none    Pain: 8/10  Location: right wrist      Objective     Observation/Appearance: Pt presents to therapy wearing wrist brace.     Edema. Measured in centimeters.    6/11/2018 6/11/2018 6/29/2018     Left Right Right   Wrist Crease 18 19.7 19.5   Midpalm 20.9 22 22   MCPs 20 21.4 21.4      Edema. Measured in centimeters.    6/11/2018 6/11/2018 6/29/2018     Left Right Right   Index:          P1 6.7 7.4 7.4   Long:          P1 6.6 7.2 7.2   Ring:          P1            6.7 7 7   Small:           P1           6.1 6.3 6.3   Thumb:        Prox. Phalanx 7.2 7.2 7.2      Elbow and Wrist ROM. Measured in degrees.    6/11/2018 6/11/2018 6/29/2018     Left Right Right   Elbow  Ext/Flex WNL WNL WNL   Supination/Pronation 50/90 50/90 60/90   Wrist Ext/Flex 70/65 36/46 50/40   Wrist RD/UD 25/60 10/26 10/25      Hand ROM. Measured in degrees.    6/11/2018 6/11/2018 6/29/2018     Left Right Right            Index: MP  77 50 50              PIP     89 79 80              DIP 70 40 45              DURAN 236 169 175            Long:  MP 80 44 47              PIP 86 84 80              DIP 80 48 50              DURAN 246 176 177            Ring:   MP 79 40 40              PIP 90 86 80              DIP 76 64 64              DURAN 245 190 184            Small:  MP 77 47 35               PIP 88 78 78               DIP 85 70 70              DURAN 250 195 183            Thumb: MP 45 26 45                IP 66 44 45       Rad ADD/ABD 65 65 65       Pal ADD/ABD 55 60 60          Opposition To distal palmar crease To mid distal phalanx of SF 3cm       Strength (Dyanmometer) and Pinch Strength (Pinch Gauge)  Measured in pounds.    6/11/2018 6/11/2018     Left Right   Rung II defer defer   Key Pinch       3pt Pinch       2pt Pinch          Sensation: Pt reports that her wrist and hand are highly sensitive to touch. Negative for numbness and tingling.     CMS Impairment/Limitation/Restriction for FOTO Survey  Therapist reviewed FOTO scores for Trudi Howard on 7/6/2018.   FOTO documents entered into [employer] - see Media section.     FOTO assessment not completed at initial eval due to time constraints. Pt arrived late for evaluation and was difficult to redirect.     Limitation Score: 74%  Category: Carrying     Current : CL  Goal: WYATT Brush received activities for 60 minutes including: fluidotherapy, RM and finger, thumb, wrist and forearm AROM as listed in the media section x 10 reps each. Pt then performed wrist wheel for wrist flex/ext, sup/pron x 3 min, wrist maze x 3 min and 1 container of pom poms nesting.       Home Exercises and Education Provided     Education provided:   - Written home exercise  program as listed in the patient instruction's section.           Assessment     Pt would continue to benefit from skilled OT. Pt tolerated all exercises with mild c/o pain. She did need moderate verbal cuing and redirecting to stay on task. Pt instructed on importance of HEP and to d/c brace.  Pt verbalized good understanding and stated that she will be more compliant.    Trudi is progressing well towards her goals and there are no updates to goals at this time. Pt prognosis is Good.     Pt will continue to benefit from skilled outpatient occupational therapy to address the deficits listed in the problem list on initial evaluation provide pt/family education and to maximize pt's level of independence in the home and community environment.     Anticipated barriers to occupational therapy: attention span    Pt's spiritual, cultural and educational needs considered and pt agreeable to plan of care and goals.    Goals:  Long Term Goals (LTGs); to be met by discharge.  LTG #1: Pt will report a pain level of 1 out of 10 with activity.             LTG #2: Pt will demo improved FOTO score by reaching CK G-Code.   LTG #3: Pt will return to prior level of function for ADLs and household management.      Short Term Goals (STGs); to be met within 4 weeks (7/11/2018).  STG #1a: Pt will report 2 out of 10 pain level at rest.  STG #2a: Pt will report/demo [place] with cooking.  STG #2b: Pt will report/demo [place] with bathing and grooming.  STG #3a: Pt will demonstrate independence with issued HEP.   STG #3b: Pt will demo improved wrist flexion and extension by 15 degrees each, needed to aid with dressing.    Plan: Cont with OT. Assess /pinch next session   Pt to be treated by Occupational Therapy 2 times per week for 4 weeks during the certification period from 6/11/2018 to 7/11/2018 to achieve the established goals.      Discussed Plan of Care with patient: Yes  Updates/Grading for next session: yellow  theraputty      MARTHA Del Rio, JET

## 2018-07-13 ENCOUNTER — CLINICAL SUPPORT (OUTPATIENT)
Dept: REHABILITATION | Facility: HOSPITAL | Age: 53
End: 2018-07-13
Attending: ORTHOPAEDIC SURGERY
Payer: MEDICAID

## 2018-07-13 DIAGNOSIS — R53.1 WEAKNESS: ICD-10-CM

## 2018-07-13 PROCEDURE — G8985 CARRY GOAL STATUS: HCPCS | Mod: CK

## 2018-07-13 PROCEDURE — 97150 GROUP THERAPEUTIC PROCEDURES: CPT

## 2018-07-13 PROCEDURE — 97530 THERAPEUTIC ACTIVITIES: CPT

## 2018-07-13 PROCEDURE — G8984 CARRY CURRENT STATUS: HCPCS | Mod: CL
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
## 2018-07-13 NOTE — PATIENT INSTRUCTIONS
[facility]  OCCUPATIONAL THERAPY  HOME EXERCISE PROGRAM     Complete the following strengthening program 1x/day.                       _                        MARTHA Del Rio, YUNIOR  Certified Hand Therapist  Occupational Therapist

## 2018-07-27 ENCOUNTER — CLINICAL SUPPORT (OUTPATIENT)
Dept: REHABILITATION | Facility: HOSPITAL | Age: 53
End: 2018-07-27
Attending: ORTHOPAEDIC SURGERY
Payer: MEDICAID

## 2018-07-27 DIAGNOSIS — R53.1 WEAKNESS: ICD-10-CM

## 2018-07-27 PROCEDURE — 97150 GROUP THERAPEUTIC PROCEDURES: CPT

## 2018-07-27 PROCEDURE — 97530 THERAPEUTIC ACTIVITIES: CPT

## 2018-07-27 NOTE — PROGRESS NOTES
"                            Occupational Therapy Daily Treatment Note     Name: Trudi Howard  Clinic Number: [id number]    Therapy Diagnosis:   Encounter Diagnosis   Name Primary?    Weakness      Physician: Kenneth Tang Jr., *    Physician Orders: Eval and Treat; PROM and AROM  Medical Diagnosis:   M25.531 (ICD-10-CM) - Right wrist pain   S62.101D (ICD-10-CM) - Closed fracture of right wrist with routine healing, subsequent encounter        Surgical Procedure and Date: N/A,   Evaluation Date: 6/11/2018  Insurance Authorization Period Expiration: 12/31/2018, charge as TA  Plan of Care Certification Period:  7/11/2018 - 8/11/2018     Date of Return to MD: 7/30/2018    Imaging 7/2/2018: X-RAYS:  AP and lateral right hand healed distal radius fracture, good position,   nondisplaced.    Visit # / Visits authorized: 5/ 20  Time In:10:30  Time Out: 11:30  Total Billable Time: 60 minutes  Charges: 4 TA    Precautions: Standard    Subjective     Pt reports that she is only doing her HEP 1 to 2 x daily. "I can't open bags."  Response to previous treatment:: Pt reports that she was sore.  Functional change: none    Pain: 8/10 at worst, 4  Location: right wrist      Objective     Observation/Appearance: Pt presents to therapy without brace     Edema. Measured in centimeters.    6/11/2018 6/11/2018 6/29/2018 7/27/2018     Left Right Right Right   Wrist Crease 18 19.7 19.5 19.5   Midpalm 20.9 22 22 22   MCPs 20 21.4 21.4 21.4      Edema. Measured in centimeters.    6/11/2018 6/11/2018 6/29/2018 7/27/2018     Left Right Right Right   Index:           P1 6.7 7.4 7.4 7.4   Long:           P1 6.6 7.2 7.2 7.2   Ring:           P1            6.7 7 7 7   Small:            P1           6.1 6.3 6.3 6.3   Thumb:         Prox. Phalanx 7.2 7.2 7.2 7.2      Elbow and Wrist ROM. Measured in degrees.    6/11/2018 6/11/2018 6/29/2018 7/27/2018     Left Right Right Right   Elbow Ext/Flex WNL WNL WNL WNL   Supination/Pronation 50/90 50/90 " 60/90 85/90   Wrist Ext/Flex 70/65 36/46 50/40 50/55   Wrist RD/UD 25/60 10/26 10/25 15/30      Hand ROM. Measured in degrees.    6/11/2018 6/11/2018 6/29/2018 7/27/2018     Left Right Right Right             Index: MP  77 50 50 60              PIP     89 79 80 86              DIP 70 40 45 50              DURAN 236 169 175 196             Long:  MP 80 44 47 55              PIP 86 84 80 82              DIP 80 48 50 55              DURAN 246 176 177 192             Ring:   MP 79 40 40 45              PIP 90 86 80 80              DIP 76 64 64 65              DURAN 245 190 184 190             Small:  MP 77 47 35 42               PIP 88 78 78 78               DIP 85 70 70 70              DURAN 250 195 183 190             Thumb: MP 45 26 45 50                IP 66 44 45 55       Rad ADD/ABD 65 65 65 65       Pal ADD/ABD 55 60 60 60          Opposition To distal palmar crease To mid distal phalanx of SF 3cm 2 cm       Strength (Dyanmometer) and Pinch Strength (Pinch Gauge)  Measured in pounds.    6/11/2018 6/11/2018 7/13/2018 7/13/2018     Left Right Left Right   Rung II defer defer 40 6   Key Pinch     12 5   3pt Pinch     13 3   2pt Pinch     10 5      Sensation: Pt reports that her wrist and hand are highly sensitive to touch. Negative for numbness and tingling.         Trudi received activities for 60 minutes including: fluidotherapy, RM and finger, thumb, wrist and forearm AROM as listed in the media section x 10 reps each. Pt then performed wrist wheel for wrist flex/ext,  wrist maze x 3 min and 1 container of pom poms nesting. Pt performed  yellow putty and performed 2' molding, 15 grasping, 5 flowering, 3 logs ea key and 3 pt,and  wrist roller with 1 lb x 5 reps.      Home Exercises and Education Provided     Education provided:   - Written home exercise program as listed in the patient instruction's section.      Assessment     Pt would continue to benefit from skilled OT. Pt tolerated all exercises with mild c/o pain.  She did need moderate verbal cuing and redirecting to stay on task.  Pt's AROM is improving in her right hand/wrist. Edema remains unchanged. Advanced functional activities. Pt continues to be non-compliant with her HEP and was instructed again on the importance of performing her HEP.     Trudi is progressing well towards her goals and there are no updates to goals at this time. Pt prognosis is Good.     Pt will continue to benefit from skilled outpatient occupational therapy to address the deficits listed in the problem list on initial evaluation provide pt/family education and to maximize pt's level of independence in the home and community environment.     Anticipated barriers to occupational therapy: attention span    Pt's spiritual, cultural and educational needs considered and pt agreeable to plan of care and goals.    Goals:  Long Term Goals (LTGs); to be met by discharge.  LTG #1: Pt will report a pain level of 1 out of 10 with activity.             LTG #2: Pt will demo improved FOTO score by reaching CK G-Code.   LTG #3: Pt will return to prior level of function for ADLs and household management.      Short Term Goals (STGs); to be met within 4 weeks (7/11/2018).  STG #1a: Pt will report 2 out of 10 pain level at rest. - not met, cont  STG #2a: Pt will report/demo [place] with cooking. Not met, cont  STG #2b: Pt will report/demo [place] with bathing and grooming. Partially met with bathing, met with grooming  STG #3a: Pt will demonstrate independence with issued HEP. - met  STG #3b: Pt will demo improved wrist flexion and extension by 15 degrees each, needed to aid with dressing. met    Plan: Cont with OT.  Advance as tolerated.    Pt to be treated by Occupational Therapy 2 times per week for 4 weeks during the certification period from 7/11/2018 to 8/11/2018 to achieve the established goals.      Discussed Plan of Care with patient: Yes  Updates/Grading for next session: red putty      Day  MARTHA Massey, CHT

## 2018-08-08 ENCOUNTER — CLINICAL SUPPORT (OUTPATIENT)
Dept: REHABILITATION | Facility: HOSPITAL | Age: 53
End: 2018-08-08
Attending: ORTHOPAEDIC SURGERY
Payer: MEDICAID

## 2018-08-08 DIAGNOSIS — R53.1 WEAKNESS: ICD-10-CM

## 2018-08-08 PROCEDURE — 97150 GROUP THERAPEUTIC PROCEDURES: CPT
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
## 2018-08-15 ENCOUNTER — TELEPHONE (OUTPATIENT)
Dept: ORTHOPEDICS | Facility: CLINIC | Age: 53
End: 2018-08-15

## 2018-08-23 ENCOUNTER — CLINICAL SUPPORT (OUTPATIENT)
Dept: REHABILITATION | Facility: HOSPITAL | Age: 53
End: 2018-08-23
Attending: ORTHOPAEDIC SURGERY
Payer: MEDICAID

## 2018-08-23 ENCOUNTER — TELEPHONE (OUTPATIENT)
Dept: ORTHOPEDICS | Facility: CLINIC | Age: 53
End: 2018-08-23

## 2018-08-23 DIAGNOSIS — R53.1 WEAKNESS: ICD-10-CM

## 2018-08-23 PROCEDURE — 97530 THERAPEUTIC ACTIVITIES: CPT

## 2018-08-23 NOTE — PLAN OF CARE
"                          Occupational Therapy Re-Evaluation Note     Name: Trudi Howard  Clinic Number: [id number]    Therapy Diagnosis:   Encounter Diagnosis   Name Primary?    Weakness      Physician: Kenneth Tang Jr., *    Physician Orders: Eval and Treat; PROM and AROM  Medical Diagnosis:   M25.531 (ICD-10-CM) - Right wrist pain   S62.101D (ICD-10-CM) - Closed fracture of right wrist with routine healing, subsequent encounter        Surgical Procedure and Date: N/A,   Evaluation Date: 6/11/2018  Insurance Authorization Period Expiration: 12/31/2018, charge as TA  Plan of Care Certification Period:  8/11/2018 - 10/11/2018     Date of Return to MD: 9/11/2018    Imaging 7/2/2018: X-RAYS:  AP and lateral right hand healed distal radius fracture, good position,   nondisplaced.    Visit # / Visits authorized: 8/20  Time In:11:15  Time Out: 12:10  Total Billable Time: 55 minutes  Charges: 4 TA    Precautions: Standard    Subjective     Pt reports that she not doing her putty and her ROM exercises 2 x daily.  Response to previous treatment:: "I'm moving better."  Functional change: none    Pain: 9/10 at worst, 3/10 at best  Location: right wrist      Objective     Observation/Appearance: Pt presents to therapy without brace     Edema. Measured in centimeters.    6/11/2018 6/11/2018 6/29/2018 7/27/2018 8/23/2018     Left Right Right Right Right   Wrist Crease 18 19.7 19.5 19.5 19.5   Midpalm 20.9 22 22 22 22   MCPs 20 21.4 21.4 21.4 20.5      Edema. Measured in centimeters.    6/11/2018 6/11/2018 6/29/2018 7/27/2018 8/23/2018     Left Right Right Right Right   Index:            P1 6.7 7.4 7.4 7.4 7.5   Long:            P1 6.6 7.2 7.2 7.2 7.0   Ring:            P1            6.7 7 7 7 7.0   Small:             P1           6.1 6.3 6.3 6.3 6.5   Thumb:          Prox. Phalanx 7.2 7.2 7.2 7.2 7.2      Elbow and Wrist ROM. Measured in degrees.    6/11/2018/2018 6/11/2018 6/29/2018 7/27/2018 8/23/2018     Left Right Right " Right Right   Elbow Ext/Flex WNL WNL WNL WNL WNL   Supination/Pronation 50/90 50/90 60/90 85/90 85/90   Wrist Ext/Flex 70/65 36/46 50/40 50/55 50/60   Wrist RD/UD 25/60 10/26 10/25 15/30 15/35      Hand ROM. Measured in degrees.    6/11/2018 6/11/2018 6/29/2018 7/27/2018 8/23/2018     Left Right Right Right Right              Index: MP  77 50 50 60 60              PIP     89 79 80 86 90              DIP 70 40 45 50 60              DURAN 236 169 175 196 210              Long:  MP 80 44 47 55 55              PIP 86 84 80 82 90              DIP 80 48 50 55 60              DURAN 246 176 177 192 205              Ring:   MP 79 40 40 45 50              PIP 90 86 80 80 89              DIP 76 64 64 65 70              DURAN 245 190 184 190 209              Small:  MP 77 47 35 42 42               PIP 88 78 78 78 78               DIP 85 70 70 70 70              DURAN 250 195 183 190 190              Thumb: MP 45 26 45 50 50                IP 66 44 45 55 60       Rad ADD/ABD 65 65 65 65 65       Pal ADD/ABD 55 60 60 60 60          Opposition To distal palmar crease To mid distal phalanx of SF 3cm 2 cm 1 cm       Strength (Dyanmometer) and Pinch Strength (Pinch Gauge)  Measured in pounds.    6/11/2018 6/11/2018 7/13/2018 7/13/2018 8/23/2018     Left Right Left Right Right   Rung II defer defer 40 6 15   Mendieta Pinch     12 5 8   3pt Pinch     13 3 5   2pt Pinch     10 5 3      Sensation: Pt reports that her wrist and hand are highly sensitive to touch. Negative for numbness and tingling.         Trudi received therapeutic activities for 60 minutes including: fluidotherapy, RM and finger, thumb, wrist and forearm AROM as listed in the media section x 10 reps each. Pt then performed wrist flex/ext with 2 # x 10 reps, 1 plate on rotation bar x 15 reps, yellow digiflex x 30 reps.      Home Exercises and Education Provided     Education provided:   - Written home exercise program as listed in the patient instruction's section.      Assessment      Pt would continue to benefit from skilled OT. Pt tolerated all exercises with mild c/o pain.  Advanced functional activities. Pt continues to be non-compliant with her HEP and was instructed again on the importance of performing her HEP. Pt has made mild gains with ROM and /pinch strength in her right hand. She reports that she is using her hand for more functional activities such as lifting and grasping the steering wheel.    Trudi is progressing well towards her goals and there are no updates to goals at this time. Pt prognosis is Good.     Pt will continue to benefit from skilled outpatient occupational therapy to address the deficits listed in the problem list on initial evaluation provide pt/family education and to maximize pt's level of independence in the home and community environment.     Anticipated barriers to occupational therapy: attention span and lack of performing HEP as instructed    Pt's spiritual, cultural and educational needs considered and pt agreeable to plan of care and goals.    Goals:  Long Term Goals (LTGs); to be met by discharge.  LTG #1: Pt will report a pain level of 1 out of 10 with activity.             LTG #2: Pt will demo improved FOTO score by reaching CK G-Code.   LTG #3: Pt will return to prior level of function for ADLs and household management.      Short Term Goals (STGs); to be met within 4 weeks (7/11/2018).  STG #1a: Pt will report 2 out of 10 pain level at rest. - not met, cont  STG #2a: Pt will report/demo [place] with cooking. Not met, cont  STG #2b: Pt will report/demo [place] with bathing and grooming. Partially met with bathing, met with grooming  STG #3a: Pt will demonstrate independence with issued HEP. - met  STG #3b: Pt will demo improved wrist flexion and extension by 15 degrees each, needed to aid with dressing. met    Plan: Cont with OT.     Pt to be treated by Occupational Therapy 2 times per week for 8 weeks during the certification period  from 8/11/2018 to 10/11/2018 to achieve the established goals.      Discussed Plan of Care with patient: Yes  Updates/Grading for next session: Advance as tolerated      MARTHA Del Rio, JET

## 2018-08-23 NOTE — TELEPHONE ENCOUNTER
----- Message from Talya Kaur sent at 8/23/2018 11:30 AM CDT -----  Contact: [phone]/self  Pt requesting to speak with you concerning being seen ASAP for  hand and wrist pain  Please call

## 2018-08-23 NOTE — TELEPHONE ENCOUNTER
I spoke with the patient and made her a sooner appointment. She was made aware of date, time and new location.

## 2018-08-23 NOTE — PROGRESS NOTES
"                            Occupational Therapy Re-Evaluation Note     Name: Trudi Howard  Clinic Number: [id number]    Therapy Diagnosis:   Encounter Diagnosis   Name Primary?    Weakness      Physician: Kenneth Tang Jr., *    Physician Orders: Eval and Treat; PROM and AROM  Medical Diagnosis:   M25.531 (ICD-10-CM) - Right wrist pain   S62.101D (ICD-10-CM) - Closed fracture of right wrist with routine healing, subsequent encounter        Surgical Procedure and Date: N/A,   Evaluation Date: 6/11/2018  Insurance Authorization Period Expiration: 12/31/2018, charge as TA  Plan of Care Certification Period:  8/11/2018 - 10/11/2018     Date of Return to MD: 9/11/2018    Imaging 7/2/2018: X-RAYS:  AP and lateral right hand healed distal radius fracture, good position,   nondisplaced.    Visit # / Visits authorized: 8/20  Time In:11:15  Time Out: 12:10  Total Billable Time: 55 minutes  Charges: 4 TA    Precautions: Standard    Subjective     Pt reports that she not doing her putty and her ROM exercises 2 x daily.  Response to previous treatment:: "I'm moving better."  Functional change: none    Pain: 9/10 at worst, 3/10 at best  Location: right wrist      Objective     Observation/Appearance: Pt presents to therapy without brace     Edema. Measured in centimeters.    6/11/2018 6/11/2018 6/29/2018 7/27/2018 8/23/2018     Left Right Right Right Right   Wrist Crease 18 19.7 19.5 19.5 19.5   Midpalm 20.9 22 22 22 22   MCPs 20 21.4 21.4 21.4 20.5      Edema. Measured in centimeters.    6/11/2018 6/11/2018 6/29/2018 7/27/2018 8/23/2018     Left Right Right Right Right   Index:            P1 6.7 7.4 7.4 7.4 7.5   Long:            P1 6.6 7.2 7.2 7.2 7.0   Ring:            P1            6.7 7 7 7 7.0   Small:             P1           6.1 6.3 6.3 6.3 6.5   Thumb:          Prox. Phalanx 7.2 7.2 7.2 7.2 7.2      Elbow and Wrist ROM. Measured in degrees.    6/11/2018/2018 6/11/2018 6/29/2018 7/27/2018 8/23/2018     Left Right Right " Right Right   Elbow Ext/Flex WNL WNL WNL WNL WNL   Supination/Pronation 50/90 50/90 60/90 85/90 85/90   Wrist Ext/Flex 70/65 36/46 50/40 50/55 50/60   Wrist RD/UD 25/60 10/26 10/25 15/30 15/35      Hand ROM. Measured in degrees.    6/11/2018 6/11/2018 6/29/2018 7/27/2018 8/23/2018     Left Right Right Right Right              Index: MP  77 50 50 60 60              PIP     89 79 80 86 90              DIP 70 40 45 50 60              DURAN 236 169 175 196 210              Long:  MP 80 44 47 55 55              PIP 86 84 80 82 90              DIP 80 48 50 55 60              DURAN 246 176 177 192 205              Ring:   MP 79 40 40 45 50              PIP 90 86 80 80 89              DIP 76 64 64 65 70              DURAN 245 190 184 190 209              Small:  MP 77 47 35 42 42               PIP 88 78 78 78 78               DIP 85 70 70 70 70              DURAN 250 195 183 190 190              Thumb: MP 45 26 45 50 50                IP 66 44 45 55 60       Rad ADD/ABD 65 65 65 65 65       Pal ADD/ABD 55 60 60 60 60          Opposition To distal palmar crease To mid distal phalanx of SF 3cm 2 cm 1 cm       Strength (Dyanmometer) and Pinch Strength (Pinch Gauge)  Measured in pounds.    6/11/2018 6/11/2018 7/13/2018 7/13/2018 8/23/2018     Left Right Left Right Right   Rung II defer defer 40 6 15   Mendieta Pinch     12 5 8   3pt Pinch     13 3 5   2pt Pinch     10 5 3      Sensation: Pt reports that her wrist and hand are highly sensitive to touch. Negative for numbness and tingling.         Trudi received therapeutic activities for 60 minutes including: fluidotherapy, RM and finger, thumb, wrist and forearm AROM as listed in the media section x 10 reps each. Pt then performed wrist flex/ext with 2 # x 10 reps, 1 plate on rotation bar x 15 reps, yellow digiflex x 30 reps.      Home Exercises and Education Provided     Education provided:   - Written home exercise program as listed in the patient instruction's section.      Assessment      Pt would continue to benefit from skilled OT. Pt tolerated all exercises with mild c/o pain.  Advanced functional activities. Pt continues to be non-compliant with her HEP and was instructed again on the importance of performing her HEP. Pt has made mild gains with ROM and /pinch strength in her right hand. She reports that she is using her hand for more functional activities such as lifting and grasping the steering wheel.    Trudi is progressing well towards her goals and there are no updates to goals at this time. Pt prognosis is Good.     Pt will continue to benefit from skilled outpatient occupational therapy to address the deficits listed in the problem list on initial evaluation provide pt/family education and to maximize pt's level of independence in the home and community environment.     Anticipated barriers to occupational therapy: attention span and lack of performing HEP as instructed    Pt's spiritual, cultural and educational needs considered and pt agreeable to plan of care and goals.    Goals:  Long Term Goals (LTGs); to be met by discharge.  LTG #1: Pt will report a pain level of 1 out of 10 with activity.             LTG #2: Pt will demo improved FOTO score by reaching CK G-Code.   LTG #3: Pt will return to prior level of function for ADLs and household management.      Short Term Goals (STGs); to be met within 4 weeks (7/11/2018).  STG #1a: Pt will report 2 out of 10 pain level at rest. - not met, cont  STG #2a: Pt will report/demo [place] with cooking. Not met, cont  STG #2b: Pt will report/demo [place] with bathing and grooming. Partially met with bathing, met with grooming  STG #3a: Pt will demonstrate independence with issued HEP. - met  STG #3b: Pt will demo improved wrist flexion and extension by 15 degrees each, needed to aid with dressing. met    Plan: Cont with OT.     Pt to be treated by Occupational Therapy 2 times per week for 8 weeks during the certification period  from 8/11/2018 to 10/11/2018 to achieve the established goals.      Discussed Plan of Care with patient: Yes  Updates/Grading for next session: Advance as tolerated      MARTHA Del Rio, JET

## 2018-09-11 ENCOUNTER — OFFICE VISIT (OUTPATIENT)
Dept: ORTHOPEDICS | Facility: CLINIC | Age: 53
End: 2018-09-11
Payer: MEDICAID

## 2018-09-11 VITALS — WEIGHT: 263 LBS | HEIGHT: 69 IN | BODY MASS INDEX: 38.95 KG/M2

## 2018-09-11 DIAGNOSIS — S62.101D CLOSED FRACTURE OF RIGHT WRIST WITH ROUTINE HEALING, SUBSEQUENT ENCOUNTER: Primary | ICD-10-CM

## 2018-09-11 PROCEDURE — 99213 OFFICE O/P EST LOW 20 MIN: CPT | Mod: PBBFAC,PO | Performed by: ORTHOPAEDIC SURGERY

## 2018-09-11 PROCEDURE — 99213 OFFICE O/P EST LOW 20 MIN: CPT | Mod: S$PBB,,, | Performed by: ORTHOPAEDIC SURGERY

## 2018-09-11 PROCEDURE — 99999 PR PBB SHADOW E&M-EST. PATIENT-LVL III: CPT | Mod: PBBFAC,,, | Performed by: ORTHOPAEDIC SURGERY

## 2018-09-11 RX ORDER — DICLOFENAC SODIUM 10 MG/G
2 GEL TOPICAL 3 TIMES DAILY
Qty: 1 TUBE | Refills: 3 | Status: SHIPPED | OUTPATIENT
Start: 2018-09-11 | End: 2018-09-13

## 2018-09-11 NOTE — PROGRESS NOTES
HISTORY OF PRESENT ILLNESS:  Ms. Howard is in followup of right distal radius   fracture.  She is a little over three months out from injury.  She is currently   in therapy, but apparently she has missed a few visits recently.  She does have   a lot of stiffness in the fingers.  Pain seems better.    PHYSICAL EXAMINATION:  RIGHT HAND AND WRIST:  Wrist has almost full range of motion without much pain;   fingers, however, very stiff, still has limited flexion of the digits,   especially the middle and ring fingers.  She has full active extension.    PLAN:  I have given her a squeeze ball today to work on exercises at home.    Continue therapy.  Increase activities as tolerated.  Follow up in one month.      KYLEE  dd: 09/11/2018 11:24:42 (CDT)  td: 09/11/2018 23:37:55 (CDT)  Doc ID   #[id number]  Job ID #[id number]    CC:

## 2018-09-13 ENCOUNTER — OFFICE VISIT (OUTPATIENT)
Dept: SURGERY | Facility: CLINIC | Age: 53
End: 2018-09-13
Payer: MEDICAID

## 2018-09-13 VITALS
SYSTOLIC BLOOD PRESSURE: 128 MMHG | WEIGHT: 275 LBS | HEIGHT: 69 IN | BODY MASS INDEX: 40.73 KG/M2 | DIASTOLIC BLOOD PRESSURE: 82 MMHG

## 2018-09-13 DIAGNOSIS — K43.2 INCISIONAL HERNIA, WITHOUT OBSTRUCTION OR GANGRENE: Primary | ICD-10-CM

## 2018-09-13 PROCEDURE — 99214 OFFICE O/P EST MOD 30 MIN: CPT | Mod: ,,, | Performed by: SURGERY

## 2018-09-13 NOTE — PROGRESS NOTES
Patient comes in to discuss possible repair of her incisional hernia. She was then lost too much weight since last visit. Besides that she's feeling well. There have been no health issues except for some nasal allergies. Patient does report a hernia growing significantly.    Patient was examined. Overall she is looking well. The hernia is somewhat larger but not tremendously so. Patient remains obese.    Impression/plan: Incisional hernia. Difficult case. Patient is obese. Her underlying problem required a long hospital stay undetermined etiology of infection and multiorgan failure. All of the above makes me quite reluctant to perform elective surgery however patient has been doing well. His been over a year since surgery. Hernia is enlarging. Although on reluctant to go have a reason to deny patient the hernia repair she desires. Will get a CT scan of the abdomen and pelvis to make sure there is no undetected problems at the moment. We'll also asked the patient to see an infectious disease specialist to see there is any suggestions as far as prophylaxis since patient is going to require mesh placement. Follow-up here after above were done. Long discussion with the patient about everything.

## 2018-09-18 ENCOUNTER — CLINICAL SUPPORT (OUTPATIENT)
Dept: REHABILITATION | Facility: HOSPITAL | Age: 53
End: 2018-09-18
Attending: ORTHOPAEDIC SURGERY
Payer: MEDICAID

## 2018-09-18 DIAGNOSIS — R53.1 WEAKNESS: ICD-10-CM

## 2018-09-18 PROCEDURE — 97530 THERAPEUTIC ACTIVITIES: CPT

## 2018-09-18 NOTE — PROGRESS NOTES
Occupational Therapy Re-Evaluation Note     Name: Trudi Howard  Clinic Number: [id number]    Therapy Diagnosis:   Encounter Diagnosis   Name Primary?    Weakness      Physician: Kenneth Tang Jr., *    Physician Orders: Eval and Treat; PROM and AROM  Medical Diagnosis:   M25.531 (ICD-10-CM) - Right wrist pain   S62.101D (ICD-10-CM) - Closed fracture of right wrist with routine healing, subsequent encounter        Surgical Procedure and Date: N/A,   Evaluation Date: 6/11/2018  Insurance Authorization Period Expiration: 12/31/2018, charge as TA  Plan of Care Certification Period:  8/11/2018 - 10/11/2018     Date of Return to MD: 10/8/2018    Imaging 7/2/2018: X-RAYS:  AP and lateral right hand healed distal radius fracture, good position,   nondisplaced.    Visit date: 9/18/2018  Visit # / Visits authorized: 9/20  Time In:11:10 AM  Time Out: 12:00 PM  Total Billable Time: 50 minutes  Charges: 4 TA    Precautions: Standard    Subjective     Pt reports that she is squeezing a ball that Dr. Tang gave her.  Response to previous treatment:: Pt reports that she is moving her hand better.  Functional change: Pt able to  pot with water in it using her right hand.    Pain: 7-8/10 at worst, 2-3/10 at best  Location: right wrist      Objective        Edema. Measured in centimeters.    6/11/2018 6/11/2018 6/29/2018 7/27/2018 8/23/2018     Left Right Right Right Right   Wrist Crease 18 19.7 19.5 19.5 19.5   Midpalm 20.9 22 22 22 22   MCPs 20 21.4 21.4 21.4 20.5      Edema. Measured in centimeters.    6/11/2018 6/11/2018 6/29/2018 7/27/2018 8/23/2018     Left Right Right Right Right   Index:            P1 6.7 7.4 7.4 7.4 7.5   Long:            P1 6.6 7.2 7.2 7.2 7.0   Ring:            P1            6.7 7 7 7 7.0   Small:             P1           6.1 6.3 6.3 6.3 6.5   Thumb:          Prox. Phalanx 7.2 7.2 7.2 7.2 7.2      Elbow and Wrist ROM. Measured in degrees.    6/11/2018 6/11/2018  6/29/2018 7/27/2018 8/23/2018     Left Right Right Right Right   Elbow Ext/Flex WNL WNL WNL WNL WNL   Supination/Pronation 50/90 50/90 60/90 85/90 85/90   Wrist Ext/Flex 70/65 36/46 50/40 50/55 50/60   Wrist RD/UD 25/60 10/26 10/25 15/30 15/35      Hand ROM. Measured in degrees.    6/11/2018 6/11/2018 6/29/2018 7/27/2018 8/23/2018     Left Right Right Right Right              Index: MP  77 50 50 60 60              PIP     89 79 80 86 90              DIP 70 40 45 50 60              DURAN 236 169 175 196 210              Long:  MP 80 44 47 55 55              PIP 86 84 80 82 90              DIP 80 48 50 55 60              DURAN 246 176 177 192 205              Ring:   MP 79 40 40 45 50              PIP 90 86 80 80 89              DIP 76 64 64 65 70              DURAN 245 190 184 190 209              Small:  MP 77 47 35 42 42               PIP 88 78 78 78 78               DIP 85 70 70 70 70              DURAN 250 195 183 190 190              Thumb: MP 45 26 45 50 50                IP 66 44 45 55 60       Rad ADD/ABD 65 65 65 65 65       Pal ADD/ABD 55 60 60 60 60          Opposition To distal palmar crease To mid distal phalanx of SF 3cm 2 cm 1 cm       Strength (Dyanmometer) and Pinch Strength (Pinch Gauge)  Measured in pounds.    6/11/2018 6/11/2018 7/13/2018 7/13/2018 8/23/2018     Left Right Left Right Right   Rung II defer defer 40 6 15   Mendieta Pinch     12 5 8   3pt Pinch     13 3 5   2pt Pinch     10 5 3             Trudi received therapeutic activities for 50 minutes including: fluidotherapy, RM and finger, thumb, wrist and forearm AROM as listed in the media section x 10 reps each. Pt then performed hand gripper with 3 yellow bands x 30 reps,  wrist 3 ways with 2 # 2  x 15  reps, 2 plates on rotation bar x 15 reps, red digiflex x 30 reps.      Home Exercises and Education Provided     Education provided:   - Written home exercise program as listed in the patient instruction's section.      Assessment      Pt tolerated  all exercises with mild c/o pain.  Advanced functional activities. Pt continues to be non-compliant with her HEP and was instructed again on the importance of performing her HEP.     Trudi is progressing well towards her goals and there are no updates to goals at this time. Pt prognosis is Good.     Pt will continue to benefit from skilled outpatient occupational therapy to address the deficits listed in the problem list on initial evaluation provide pt/family education and to maximize pt's level of independence in the home and community environment.     Anticipated barriers to occupational therapy: attention span and lack of performing HEP as instructed    Pt's spiritual, cultural and educational needs considered and pt agreeable to plan of care and goals.    Goals:  Long Term Goals (LTGs); to be met by discharge.  LTG #1: Pt will report a pain level of 1 out of 10 with activity.-  progressing         LTG #2: Pt will demo improved FOTO score by reaching CK G-Code. - progressing  LTG #3: Pt will return to prior level of function for ADLs and household management.-  progressing     Short Term Goals (STGs); to be met within 4 weeks (7/11/2018).  STG #1a: Pt will report 2 out of 10 pain level at rest. - not met, progressing  STG #2a: Pt will report/demo [place] with cooking. Not met, progressing  STG #2b: Pt will report/demo [place] with bathing and grooming. Partially met with bathing, met with grooming - progressing  STG #3a: Pt will demonstrate independence with issued HEP. - met 9/18/2018  STG #3b: Pt will demo improved wrist flexion and extension by 15 degrees each, needed to aid with dressing. Met 9/18/2018    Plan: Cont with OT.  Re-assess next session   Pt to be treated by Occupational Therapy 2 times per week for 8 weeks during the certification period from 8/11/2018 to 10/11/2018 to achieve the established goals.      Discussed Plan of Care with patient: Yes  Updates/Grading for next session: Advance  as tolerated      MARTHA Del Rio, JET

## 2018-09-20 ENCOUNTER — CLINICAL SUPPORT (OUTPATIENT)
Dept: REHABILITATION | Facility: HOSPITAL | Age: 53
End: 2018-09-20
Attending: ORTHOPAEDIC SURGERY
Payer: MEDICAID

## 2018-09-20 DIAGNOSIS — R53.1 WEAKNESS: ICD-10-CM

## 2018-09-20 PROCEDURE — 97530 THERAPEUTIC ACTIVITIES: CPT

## 2018-09-20 NOTE — PROGRESS NOTES
Occupational Therapy Daily Treatment Note     Name: Trudi Howard  Clinic Number: [id number]    Therapy Diagnosis:   Encounter Diagnosis   Name Primary?    Weakness      Physician: Kenneth Tang Jr., *    Physician Orders: Eval and Treat; PROM and AROM  Medical Diagnosis:   M25.531 (ICD-10-CM) - Right wrist pain   S62.101D (ICD-10-CM) - Closed fracture of right wrist with routine healing, subsequent encounter        Surgical Procedure and Date: N/A,   Evaluation Date: 6/11/2018  Insurance Authorization Period Expiration: 12/31/2018, charge as TA  Plan of Care Certification Period:  8/11/2018 - 10/11/2018     Date of Return to MD: 10/8/2018    Imaging 7/2/2018: X-RAYS:  AP and lateral right hand healed distal radius fracture, good position,   nondisplaced.    Visit date: 9/18/2018  Visit # / Visits authorized: 9/20  Time In:11:10 AM  Time Out: 12:00 PM  Total Billable Time: 50 minutes  Charges: 4 TA    Precautions: Standard    Subjective     Pt reports that she is squeezing a ball that Dr. Tang gave her. Pt reports that she is not compliant with her HEP.   Response to previous treatment:: Pt reports that she is moving her hand better.  Functional change: Pt able to  pot with water in it using her right hand.    Pain: 7-8/10 at worst, 2-3/10 at best  Location: right wrist      Objective        Edema. Measured in centimeters.    6/11/2018 6/11/2018 6/29/2018 7/27/2018 8/23/2018 9/20/2018     Left Right Right Right Right Right   Wrist Crease 18 19.7 19.5 19.5 19.5 19.3   Midpalm 20.9 22 22 22 22 22   MCPs 20 21.4 21.4 21.4 20.5 20.5      Edema. Measured in centimeters.    6/11/2018 6/11/2018 6/29/2018 7/27/2018 8/23/2018 9/20/2018     Left Right Right Right Right Right   Index:             P1 6.7 7.4 7.4 7.4 7.5 7.5   Long:             P1 6.6 7.2 7.2 7.2 7.0 7.0   Ring:             P1            6.7 7 7 7 7.0 7.0   Small:              P1           6.1 6.3 6.3 6.3 6.5 6.5   Thumb:            Prox. Phalanx 7.2 7.2 7.2 7.2 7.2 7.2      Elbow and Wrist ROM. Measured in degrees.    6/11/2018 6/11/2018 6/29/2018 7/27/2018 8/23/2018 9/20/2018     Left Right Right Right Right Right   Elbow Ext/Flex WNL WNL WNL WNL WNL WNL   Supination/Pronation 50/90 50/90 60/90 85/90 85/90 85/90   Wrist Ext/Flex 70/65 36/46 50/40 50/55 50/60 55/65   Wrist RD/UD 25/60 10/26 10/25 15/30 15/35 20/35      Hand ROM. Measured in degrees.    6/11/2018 6/11/2018 6/29/2018 7/27/2018 8/23/2018 9/20/2018     Left Right Right Right Right Right               Index: MP  77 50 50 60 60 60              PIP     89 79 80 86 90 93              DIP 70 40 45 50 60 60              DURAN 236 169 175 196 210 213               Long:  MP 80 44 47 55 55 55              PIP 86 84 80 82 90 90              DIP 80 48 50 55 60 60              DURAN 246 176 177 192 205 205               Ring:   MP 79 40 40 45 50 50              PIP 90 86 80 80 89 90              DIP 76 64 64 65 70 70              DURAN 245 190 184 190 209 210               Small:  MP 77 47 35 42 42 44               PIP 88 78 78 78 78 85               DIP 85 70 70 70 70 70              DURAN 250 195 183 190 190 199               Thumb: MP 45 26 45 50 50 50                IP 66 44 45 55 60 65       Rad ADD/ABD 65 65 65 65 65 65       Pal ADD/ABD 55 60 60 60 60 60          Opposition To distal palmar crease To mid distal phalanx of SF 3cm 2 cm 1 cm Base of 5th       Strength (Dyanmometer) and Pinch Strength (Pinch Gauge)  Measured in pounds.    6/11/2018 6/11/2018 7/13/2018 7/13/2018 8/23/2018 9/20/2018     Left Right Left Right Right Right   Rung II defer defer 40 6 15 15   Mendieta Pinch     12 5 8 8   3pt Pinch     13 3 5 5   2pt Pinch     10 5 3 3             Trudi received therapeutic activities for 50 minutes including: fluidotherapy, RM and finger, thumb, wrist and forearm AROM as listed in the media section x 10 reps each. Pt then performed hand gripper with 1 red and 2 yellow bands x  30 reps,  wrist 3 ways with 2 # 2  x 15  reps, 2 plates on rotation bar x 15 reps, red digiflex x 30 reps.      Home Exercises and Education Provided     Education provided:   - Written home exercise program as listed in the patient instruction's section.      Assessment      Pt tolerated all exercises with mild c/o pain.  Advanced functional activities. Pt continues to be non-compliant with her HEP and was instructed again on the importance of performing her HEP. Pt made minimal improvements with AROM of her hand and wrist. No improvement noted with decreasing edema in her hand or /pinch strength.    Trudi is progressing well towards her goals and there are no updates to goals at this time. Pt prognosis is Good.     Pt will continue to benefit from skilled outpatient occupational therapy to address the deficits listed in the problem list on initial evaluation provide pt/family education and to maximize pt's level of independence in the home and community environment.     Anticipated barriers to occupational therapy: attention span and lack of performing HEP as instructed    Pt's spiritual, cultural and educational needs considered and pt agreeable to plan of care and goals.    Goals:  Long Term Goals (LTGs); to be met by discharge.  LTG #1: Pt will report a pain level of 1 out of 10 with activity.-  progressing         LTG #2: Pt will demo improved FOTO score by reaching CK G-Code. - progressing  LTG #3: Pt will return to prior level of function for ADLs and household management.-  progressing     Short Term Goals (STGs); to be met within 4 weeks (7/11/2018).  STG #1a: Pt will report 2 out of 10 pain level at rest. - not met, progressing  STG #2a: Pt will report/demo [place] with cooking. Not met, progressing  STG #2b: Pt will report/demo [place] with bathing and grooming. Partially met with bathing, met with grooming - progressing  STG #3a: Pt will demonstrate independence with issued HEP. - met  9/18/2018  STG #3b: Pt will demo improved wrist flexion and extension by 15 degrees each, needed to aid with dressing. Met 9/18/2018    Plan: Cont with OT.     Pt to be treated by Occupational Therapy 2 times per week for 8 weeks during the certification period from 8/11/2018 to 10/11/2018 to achieve the established goals.      Discussed Plan of Care with patient: Yes  Updates/Grading for next session: Advance as tolerated      MARTHA Del Rio, CHT

## 2018-09-27 ENCOUNTER — CLINICAL SUPPORT (OUTPATIENT)
Dept: REHABILITATION | Facility: HOSPITAL | Age: 53
End: 2018-09-27
Attending: ORTHOPAEDIC SURGERY
Payer: MEDICAID

## 2018-09-27 DIAGNOSIS — R53.1 WEAKNESS: ICD-10-CM

## 2018-09-27 PROCEDURE — 97530 THERAPEUTIC ACTIVITIES: CPT
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
## 2018-10-05 ENCOUNTER — CLINICAL SUPPORT (OUTPATIENT)
Dept: REHABILITATION | Facility: HOSPITAL | Age: 53
End: 2018-10-05
Attending: ORTHOPAEDIC SURGERY
Payer: MEDICAID

## 2018-10-05 DIAGNOSIS — R53.1 WEAKNESS: ICD-10-CM

## 2018-10-05 PROCEDURE — 97150 GROUP THERAPEUTIC PROCEDURES: CPT

## 2018-10-05 NOTE — PLAN OF CARE
Occupational Therapy Re-Evaluation Note     Name: Trudi Howard  Clinic Number: [id number]    Therapy Diagnosis:   Encounter Diagnosis   Name Primary?    Weakness      Physician: Kenneth Tang Jr., *    Physician Orders: Eval and Treat; PROM and AROM  Medical Diagnosis:   M25.531 (ICD-10-CM) - Right wrist pain   S62.101D (ICD-10-CM) - Closed fracture of right wrist with routine healing, subsequent encounter        Surgical Procedure and Date: N/A,   Evaluation Date: 6/11/2018  Insurance Authorization Period Expiration: 12/31/2018, charge as TA  Plan of Care Certification Period:  8/11/2018 - 10/11/2018     Date of Return to MD: 10/8/2018    Imaging 7/2/2018: X-RAYS:  AP and lateral right hand healed distal radius fracture, good position,   nondisplaced.    Visit date: 10/5/2018  Visit # / Visits authorized: 12/20  Time In:10:00 AM  Time Out: 11:00 AM  Total Billable Time: 60 minutes  Charges: 4 TA    Precautions: Standard    Subjective     Pt states that she is doing her HEP on occasion.  Response to previous treatment:: Pt reports that she hit her hand on a door.   Functional change: Pt reports she is taking the dishes out of the dishwater.    Pain: 8/10 at worst, 2-3/10 at best  Location: right wrist      Objective        Edema. Measured in centimeters.    6/11/2018 6/11/2018 6/29/2018 7/27/2018 8/23/2018 9/20/2018 10/5/2018     Left Right Right Right Right Right Right   Wrist Crease 18 19.7 19.5 19.5 19.5 19.3 19.3   Midpalm 20.9 22 22 22 22 22 21.5   MCPs 20 21.4 21.4 21.4 20.5 20.5 20.5      Edema. Measured in centimeters.    6/11/2018 6/11/2018 6/29/2018 7/27/2018 8/23/2018 9/20/2018 10/5/2018     Left Right Right Right Right Right Right   Index:              P1 6.7 7.4 7.4 7.4 7.5 7.5 7.5   Long:              P1 6.6 7.2 7.2 7.2 7.0 7.0 7.0   Ring:              P1            6.7 7 7 7 7.0 7.0 7.0   Small:               P1           6.1 6.3 6.3 6.3 6.5 6.5 6.5   Thumb:             Prox. Phalanx 7.2 7.2 7.2 7.2 7.2 7.2 7.2      Elbow and Wrist ROM. Measured in degrees.    6/11/2018 6/11/2018 6/29/2018 7/27/2018 8/23/2018 9/20/2018 10/5/2018     Left Right Right Right Right Right Right   Elbow Ext/Flex WNL WNL WNL WNL WNL WNL WNL   Supination/Pronation 50/90 50/90 60/90 85/90 85/90 85/90 WNL   Wrist Ext/Flex 70/65 36/46 50/40 50/55 50/60 55/65 55/65   Wrist RD/UD 25/60 10/26 10/25 15/30 15/35 20/35 20/35      Hand ROM. Measured in degrees.    6/11/2018 6/11/2018 6/29/2018 7/27/2018 8/23/2018 9/20/2018 10/5/2018     Left Right Right Right Right Right Right                Index: MP  77 50 50 60 60 60 60              PIP     89 79 80 86 90 93 93              DIP 70 40 45 50 60 60 60              DURAN 236 169 175 196 210 213 213                Long:  MP 80 44 47 55 55 55 55              PIP 86 84 80 82 90 90 90              DIP 80 48 50 55 60 60 60              DURAN 246 176 177 192 205 205 205                Ring:   MP 79 40 40 45 50 50 50              PIP 90 86 80 80 89 90 90              DIP 76 64 64 65 70 70 70              DURAN 245 190 184 190 209 210 210                Small:  MP 77 47 35 42 42 44 44               PIP 88 78 78 78 78 85 85               DIP 85 70 70 70 70 70 70              DURAN 250 195 183 190 190 199 199                Thumb: MP 45 26 45 50 50 50 50                IP 66 44 45 55 60 65 65       Rad ADD/ABD 65 65 65 65 65 65 65       Pal ADD/ABD 55 60 60 60 60 60 60          Opposition To distal palmar crease To mid distal phalanx of SF 3cm 2 cm 1 cm Base of 5th Base of 5th       Strength (Dyanmometer) and Pinch Strength (Pinch Gauge)  Measured in pounds.    6/11/2018 6/11/2018 7/13/2018 7/13/2018 8/23/2018 9/20/2018 10/5/2018     Left Right Left Right Right Right Right   Rung II defer defer 40 6 15 15 20   Mendieta Pinch     12 5 8 8 8   3pt Pinch     13 3 5 5 5   2pt Pinch     10 5 3 3 4           Trudi received therapeutic activities for 45 minutes including: fluidotherapy, RM  and finger, thumb, wrist and forearm AROM as listed in the media section x 10 reps each. Pt then performed hand gripper with 1 red and 2 yellow bands x 30 reps,  wrist 3 ways with 2 # 2  x 15  reps, 2 plates on rotation bar x 15 reps, yellow digi-extend, red digiflex x 30 reps.      Home Exercises and Education Provided     Education provided:   - Written home exercise program as listed in the patient instruction's section.      Assessment      Pt tolerated all exercises with mild c/o pain.  Advanced functional activities. Pt continues to be non-compliant with her HEP and was instructed again on the importance of performing her HEP. Pt'  improved by 5 bs. Her AROM has not improved in her hand or wrist since 9/20/2108.  Edema is slightly decreased.    Trudi is progressing well towards her goals and there are no updates to goals at this time. Pt prognosis is Fair.    Pt will continue to benefit from skilled outpatient occupational therapy to address the deficits listed in the problem list on initial evaluation provide pt/family education and to maximize pt's level of independence in the home and community environment.     Anticipated barriers to occupational therapy: attention span and lack of performing HEP as instructed    Pt's spiritual, cultural and educational needs considered and pt agreeable to plan of care and goals.    Goals:  Long Term Goals (LTGs); to be met by discharge.  LTG #1: Pt will report a pain level of 1 out of 10 with activity.-  progressing         LTG #2: Pt will demo improved FOTO score by reaching CK G-Code. - progressing  LTG #3: Pt will return to prior level of function for ADLs and household management.-  progressing     Short Term Goals (STGs); to be met within 4 weeks (7/11/2018).  STG #1a: Pt will report 2 out of 10 pain level at rest. - not met, progressing  STG #2a: Pt will report/demo [place] with cooking. Not met, progressing  STG #2b: Pt will report/demo [place] with  bathing and grooming. Partially met with bathing, met with grooming - progressing  STG #3a: Pt will demonstrate independence with issued HEP. - met 9/18/2018  STG #3b: Pt will demo improved wrist flexion and extension by 15 degrees each, needed to aid with dressing. Met 9/18/2018    Plan: Cont with OT.     Pt to be treated by Occupational Therapy 2 times per week for 8 weeks during the certification period from 10/11/2018 to 12/11/2018 to achieve the established goals.      Discussed Plan of Care with patient: Yes  Updates/Grading for next session: Advance as tolerated      MARTHA Del Rio, CHT

## 2018-10-05 NOTE — PROGRESS NOTES
Occupational Therapy Re-Evaluation Note     Name: Trudi Howard  Clinic Number: [id number]    Therapy Diagnosis:   Encounter Diagnosis   Name Primary?    Weakness      Physician: Kenneth Tang Jr., *    Physician Orders: Eval and Treat; PROM and AROM  Medical Diagnosis:   M25.531 (ICD-10-CM) - Right wrist pain   S62.101D (ICD-10-CM) - Closed fracture of right wrist with routine healing, subsequent encounter        Surgical Procedure and Date: N/A,   Evaluation Date: 6/11/2018  Insurance Authorization Period Expiration: 12/31/2018, charge as TA  Plan of Care Certification Period:  8/11/2018 - 10/11/2018     Date of Return to MD: 10/8/2018    Imaging 7/2/2018: X-RAYS:  AP and lateral right hand healed distal radius fracture, good position,   nondisplaced.    Visit date: 10/5/2018  Visit # / Visits authorized: 12/20  Time In:10:00 AM  Time Out: 11:00 AM  Total Billable Time: 60 minutes  Charges: 4 TA    Precautions: Standard    Subjective     Pt states that she is doing her HEP on occasion.  Response to previous treatment:: Pt reports that she hit her hand on a door.   Functional change: Pt reports she is taking the dishes out of the dishwater.    Pain: 8/10 at worst, 2-3/10 at best  Location: right wrist      Objective        Edema. Measured in centimeters.    6/11/2018 6/11/2018 6/29/2018 7/27/2018 8/23/2018 9/20/2018 10/5/2018     Left Right Right Right Right Right Right   Wrist Crease 18 19.7 19.5 19.5 19.5 19.3 19.3   Midpalm 20.9 22 22 22 22 22 21.5   MCPs 20 21.4 21.4 21.4 20.5 20.5 20.5      Edema. Measured in centimeters.    6/11/2018 6/11/2018 6/29/2018 7/27/2018 8/23/2018 9/20/2018 10/5/2018     Left Right Right Right Right Right Right   Index:              P1 6.7 7.4 7.4 7.4 7.5 7.5 7.5   Long:              P1 6.6 7.2 7.2 7.2 7.0 7.0 7.0   Ring:              P1            6.7 7 7 7 7.0 7.0 7.0   Small:               P1           6.1 6.3 6.3 6.3 6.5 6.5 6.5   Thumb:             Prox. Phalanx 7.2 7.2 7.2 7.2 7.2 7.2 7.2      Elbow and Wrist ROM. Measured in degrees.    6/11/2018 6/11/2018 6/29/2018 7/27/2018 8/23/2018 9/20/2018 10/5/2018     Left Right Right Right Right Right Right   Elbow Ext/Flex WNL WNL WNL WNL WNL WNL WNL   Supination/Pronation 50/90 50/90 60/90 85/90 85/90 85/90 WNL   Wrist Ext/Flex 70/65 36/46 50/40 50/55 50/60 55/65 55/65   Wrist RD/UD 25/60 10/26 10/25 15/30 15/35 20/35 20/35      Hand ROM. Measured in degrees.    6/11/2018 6/11/2018 6/29/2018 7/27/2018 8/23/2018 9/20/2018 10/5/2018     Left Right Right Right Right Right Right                Index: MP  77 50 50 60 60 60 60              PIP     89 79 80 86 90 93 93              DIP 70 40 45 50 60 60 60              DURAN 236 169 175 196 210 213 213                Long:  MP 80 44 47 55 55 55 55              PIP 86 84 80 82 90 90 90              DIP 80 48 50 55 60 60 60              DURAN 246 176 177 192 205 205 205                Ring:   MP 79 40 40 45 50 50 50              PIP 90 86 80 80 89 90 90              DIP 76 64 64 65 70 70 70              DURAN 245 190 184 190 209 210 210                Small:  MP 77 47 35 42 42 44 44               PIP 88 78 78 78 78 85 85               DIP 85 70 70 70 70 70 70              DURAN 250 195 183 190 190 199 199                Thumb: MP 45 26 45 50 50 50 50                IP 66 44 45 55 60 65 65       Rad ADD/ABD 65 65 65 65 65 65 65       Pal ADD/ABD 55 60 60 60 60 60 60          Opposition To distal palmar crease To mid distal phalanx of SF 3cm 2 cm 1 cm Base of 5th Base of 5th       Strength (Dyanmometer) and Pinch Strength (Pinch Gauge)  Measured in pounds.    6/11/2018 6/11/2018 7/13/2018 7/13/2018 8/23/2018 9/20/2018 10/5/2018     Left Right Left Right Right Right Right   Rung II defer defer 40 6 15 15 20   Mendieta Pinch     12 5 8 8 8   3pt Pinch     13 3 5 5 5   2pt Pinch     10 5 3 3 4           Trudi received therapeutic activities for 45 minutes including: fluidotherapy, RM  and finger, thumb, wrist and forearm AROM as listed in the media section x 10 reps each. Pt then performed hand gripper with 1 red and 2 yellow bands x 30 reps,  wrist 3 ways with 2 # 2  x 15  reps, 2 plates on rotation bar x 15 reps, yellow digi-extend, red digiflex x 30 reps.      Home Exercises and Education Provided     Education provided:   - Written home exercise program as listed in the patient instruction's section.      Assessment      Pt tolerated all exercises with mild c/o pain.  Advanced functional activities. Pt continues to be non-compliant with her HEP and was instructed again on the importance of performing her HEP. Pt'  improved by 5 bs. Her AROM has not improved in her hand or wrist since 9/20/2108.  Edema is slightly decreased.    Trudi is progressing well towards her goals and there are no updates to goals at this time. Pt prognosis is Fair.    Pt will continue to benefit from skilled outpatient occupational therapy to address the deficits listed in the problem list on initial evaluation provide pt/family education and to maximize pt's level of independence in the home and community environment.     Anticipated barriers to occupational therapy: attention span and lack of performing HEP as instructed    Pt's spiritual, cultural and educational needs considered and pt agreeable to plan of care and goals.    Goals:  Long Term Goals (LTGs); to be met by discharge.  LTG #1: Pt will report a pain level of 1 out of 10 with activity.-  progressing         LTG #2: Pt will demo improved FOTO score by reaching CK G-Code. - progressing  LTG #3: Pt will return to prior level of function for ADLs and household management.-  progressing     Short Term Goals (STGs); to be met within 4 weeks (7/11/2018).  STG #1a: Pt will report 2 out of 10 pain level at rest. - not met, progressing  STG #2a: Pt will report/demo [place] with cooking. Not met, progressing  STG #2b: Pt will report/demo [place] with  bathing and grooming. Partially met with bathing, met with grooming - progressing  STG #3a: Pt will demonstrate independence with issued HEP. - met 9/18/2018  STG #3b: Pt will demo improved wrist flexion and extension by 15 degrees each, needed to aid with dressing. Met 9/18/2018    Plan: Cont with OT.     Pt to be treated by Occupational Therapy 2 times per week for 8 weeks during the certification period from 10/11/2018 to 12/11/2018 to achieve the established goals.      Discussed Plan of Care with patient: Yes  Updates/Grading for next session: Advance as tolerated      MARTHA Del Rio, CHT

## 2018-10-08 ENCOUNTER — OFFICE VISIT (OUTPATIENT)
Dept: ORTHOPEDICS | Facility: CLINIC | Age: 53
End: 2018-10-08
Payer: MEDICAID

## 2018-10-08 VITALS — HEIGHT: 69 IN | BODY MASS INDEX: 40.73 KG/M2 | WEIGHT: 275 LBS

## 2018-10-08 DIAGNOSIS — M65.331 TRIGGER MIDDLE FINGER OF RIGHT HAND: ICD-10-CM

## 2018-10-08 PROCEDURE — 20550 NJX 1 TENDON SHEATH/LIGAMENT: CPT | Mod: S$PBB,F7,, | Performed by: ORTHOPAEDIC SURGERY

## 2018-10-08 PROCEDURE — 99212 OFFICE O/P EST SF 10 MIN: CPT | Mod: PBBFAC,PN,25 | Performed by: ORTHOPAEDIC SURGERY

## 2018-10-08 PROCEDURE — 99213 OFFICE O/P EST LOW 20 MIN: CPT | Mod: S$PBB,25,, | Performed by: ORTHOPAEDIC SURGERY

## 2018-10-08 PROCEDURE — 99999 PR PBB SHADOW E&M-EST. PATIENT-LVL II: CPT | Mod: PBBFAC,,, | Performed by: ORTHOPAEDIC SURGERY

## 2018-10-08 PROCEDURE — 20550 NJX 1 TENDON SHEATH/LIGAMENT: CPT | Mod: PBBFAC,PN | Performed by: ORTHOPAEDIC SURGERY

## 2018-10-08 RX ORDER — TRIAMCINOLONE ACETONIDE 40 MG/ML
40 INJECTION, SUSPENSION INTRA-ARTICULAR; INTRAMUSCULAR
Status: COMPLETED | OUTPATIENT
Start: 2018-10-08 | End: 2018-10-08

## 2018-10-08 RX ADMIN — TRIAMCINOLONE ACETONIDE 40 MG: 40 INJECTION, SUSPENSION INTRA-ARTICULAR; INTRAMUSCULAR at 12:10

## 2018-10-08 NOTE — PROGRESS NOTES
HISTORY OF PRESENT ILLNESS:  Ms. Howard in followup of right distal radius   fracture, still having some stiffness of the fingers, middle and ring.  I think   she may be having some mild triggering in those digits.    PHYSICAL EXAMINATION:  There is tenderness over the flexor tendon sheath with   slight swelling and limited range of motion and tenderness over the A1 pulley.    IMPRESSION:  Triggering right middle and ring fingers.    PLAN:  I explained the nature of problem to the patient.  Recommended injections   for both fingers.  After pause for timeout, she identified the right middle and   right ring fingers, each one injected with combination of Kenalog 20 mg, 0.5 mL   Xylocaine, sterile technique.  She tolerated the procedure well without   complication.    I have recommended ice for both fingers, gentle range of motion, continue   therapy.  Follow up in 3-4 weeks.      KYLEE  dd: 10/08/2018 12:03:13 (CDT)  td: 10/09/2018 07:35:29 (CDT)  Doc ID   #[id number]  Job ID #[id number]    CC:

## 2018-10-18 ENCOUNTER — TELEPHONE (OUTPATIENT)
Dept: SURGERY | Facility: CLINIC | Age: 53
End: 2018-10-18

## 2018-10-18 NOTE — TELEPHONE ENCOUNTER
----- Message from Navid BORJA MD sent at 10/16/2018 11:48 AM CDT -----  Call patient CT scan did not show any other problems besides the hernias

## 2018-10-18 NOTE — TELEPHONE ENCOUNTER
I spoke to pt and informed her of CT findings. Pt states she has appt with Dr. Milan on 11/5/18 and would like appt with Dr. Martin for same day. Appt scheduled for 11/5/18 @ 11:30.

## 2018-10-19 ENCOUNTER — CLINICAL SUPPORT (OUTPATIENT)
Dept: REHABILITATION | Facility: HOSPITAL | Age: 53
End: 2018-10-19
Attending: ORTHOPAEDIC SURGERY
Payer: MEDICAID

## 2018-10-19 DIAGNOSIS — R53.1 WEAKNESS: ICD-10-CM

## 2018-10-19 PROCEDURE — 97530 THERAPEUTIC ACTIVITIES: CPT

## 2018-10-19 NOTE — PROGRESS NOTES
"                            Occupational Therapy Progress Note     Name: Trudi Howard  Clinic Number: [id number]    Therapy Diagnosis:   Encounter Diagnosis   Name Primary?    Weakness      Physician: Kenneth Tang Jr., *    Physician Orders: Eval and Treat; PROM and AROM  Medical Diagnosis:   M25.531 (ICD-10-CM) - Right wrist pain   S62.101D (ICD-10-CM) - Closed fracture of right wrist with routine healing, subsequent encounter        Surgical Procedure and Date: N/A,   Evaluation Date: 6/11/2018  Insurance Authorization Period Expiration: 12/31/2018, charge as TA  Plan of Care Certification Period:  8/11/2018 - 10/11/2018     Date of Return to MD: 10/8/2018    Imaging 7/2/2018: X-RAYS:  AP and lateral right hand healed distal radius fracture, good position,   nondisplaced.    Visit date: 10/19/2018  Visit # / Visits authorized: 13/20  Time In:10:15 AM  Time Out: 11:15 AM  Total Billable Time: 60 minutes  Charges: 4 TA    Precautions: Standard    Subjective     Pt reports that she received a steroid shot from Dr. Tang.   Response to previous treatment: "I feel like I'm improving"  Functional change: Pt reports that she can slightly  a pot easier than before.    Pain: 2/10 prior to therapy, 6/10 currently  Location: right wrist      Objective        Edema. Measured in centimeters.    6/11/2018 6/11/2018 6/29/2018 7/27/2018 8/23/2018 9/20/2018 10/5/2018     Left Right Right Right Right Right Right   Wrist Crease 18 19.7 19.5 19.5 19.5 19.3 19.3   Midpalm 20.9 22 22 22 22 22 21.5   MCPs 20 21.4 21.4 21.4 20.5 20.5 20.5      Edema. Measured in centimeters.    6/11/2018 6/11/2018 6/29/2018 7/27/2018 8/23/2018 9/20/2018 10/5/2018     Left Right Right Right Right Right Right   Index:              P1 6.7 7.4 7.4 7.4 7.5 7.5 7.5   Long:              P1 6.6 7.2 7.2 7.2 7.0 7.0 7.0   Ring:              P1            6.7 7 7 7 7.0 7.0 7.0   Small:               P1           6.1 6.3 6.3 6.3 6.5 6.5 6.5   Thumb:     "        Prox. Phalanx 7.2 7.2 7.2 7.2 7.2 7.2 7.2      Elbow and Wrist ROM. Measured in degrees.    6/11/2018 6/11/2018 6/29/2018 7/27/2018 8/23/2018 9/20/2018 10/5/2018     Left Right Right Right Right Right Right   Elbow Ext/Flex WNL WNL WNL WNL WNL WNL WNL   Supination/Pronation 50/90 50/90 60/90 85/90 85/90 85/90 WNL   Wrist Ext/Flex 70/65 36/46 50/40 50/55 50/60 55/65 55/65   Wrist RD/UD 25/60 10/26 10/25 15/30 15/35 20/35 20/35      Hand ROM. Measured in degrees.    6/11/2018 6/11/2018 6/29/2018 7/27/2018 8/23/2018 9/20/2018 10/5/2018     Left Right Right Right Right Right Right                Index: MP  77 50 50 60 60 60 60              PIP     89 79 80 86 90 93 93              DIP 70 40 45 50 60 60 60              DURAN 236 169 175 196 210 213 213                Long:  MP 80 44 47 55 55 55 55              PIP 86 84 80 82 90 90 90              DIP 80 48 50 55 60 60 60              DURAN 246 176 177 192 205 205 205                Ring:   MP 79 40 40 45 50 50 50              PIP 90 86 80 80 89 90 90              DIP 76 64 64 65 70 70 70              DURAN 245 190 184 190 209 210 210                Small:  MP 77 47 35 42 42 44 44               PIP 88 78 78 78 78 85 85               DIP 85 70 70 70 70 70 70              DURAN 250 195 183 190 190 199 199                Thumb: MP 45 26 45 50 50 50 50                IP 66 44 45 55 60 65 65       Rad ADD/ABD 65 65 65 65 65 65 65       Pal ADD/ABD 55 60 60 60 60 60 60          Opposition To distal palmar crease To mid distal phalanx of SF 3cm 2 cm 1 cm Base of 5th Base of 5th       Strength (Dyanmometer) and Pinch Strength (Pinch Gauge)  Measured in pounds.    6/11/2018 6/11/2018 7/13/2018 7/13/2018 8/23/2018 9/20/2018 10/5/2018     Left Right Left Right Right Right Right   Rung II defer defer 40 6 15 15 20   Mendieta Pinch     12 5 8 8 8   3pt Pinch     13 3 5 5 5   2pt Pinch     10 5 3 3 4           Trudi received therapeutic activities for 45 minutes including: fluidotherapy,  RM and finger, thumb, wrist and forearm AROM as listed in the media section x 10 reps each  -Hand gripper with 1 red and 2 yellow bands x 30 reps  -Wrist 3 ways with 2 # 2  x 15  reps  -2 plates on rotation bar x 15 reps   -Yellow digi-extend,   -Red digiflex x 30 reps      Home Exercises and Education Provided     Education provided:   -Written home exercise program as listed in the patient instruction's section.      Assessment      Pt tolerated all exercises with mild c/o pain.  Continued functional activities. Pt continues to be non-compliant with her HEP and was instructed again on the importance of performing her HEP. Pt reports that the steroid shot she received helped with pain. Pt reports improvements with ROM.    Trudi is progressing well towards her goals and there are no updates to goals at this time. Pt prognosis is Fair.    Pt will continue to benefit from skilled outpatient occupational therapy to address the deficits listed in the problem list on initial evaluation provide pt/family education and to maximize pt's level of independence in the home and community environment.     Anticipated barriers to occupational therapy: attention span and lack of performing HEP as instructed    Pt's spiritual, cultural and educational needs considered and pt agreeable to plan of care and goals.    Goals:  Long Term Goals (LTGs); to be met by discharge.  LTG #1: Pt will report a pain level of 1 out of 10 with activity.-  progressing         LTG #2: Pt will demo improved FOTO score by reaching CK G-Code. - progressing  LTG #3: Pt will return to prior level of function for ADLs and household management.-  progressing     Short Term Goals (STGs); to be met within 4 weeks (7/11/2018).  STG #1a: Pt will report 2 out of 10 pain level at rest. - not met, progressing  STG #2a: Pt will report/demo [place] with cooking. Not met, progressing  STG #2b: Pt will report/demo [place] with bathing and grooming. Partially  met with bathing, met with grooming - progressing  STG #3a: Pt will demonstrate independence with issued HEP. - met 9/18/2018  STG #3b: Pt will demo improved wrist flexion and extension by 15 degrees each, needed to aid with dressing. Met 9/18/2018    Plan: Cont with OT.     Pt to be treated by Occupational Therapy 2 times per week for 8 weeks during the certification period from 10/11/2018 to 12/11/2018 to achieve the established goals.      Discussed Plan of Care with patient: Yes  Updates/Grading for next session: Advance as tolerated    GUILLERMO Mason Student     I certify that I was present in the room directing the student in service delivery and guiding them using my skilled judgment.  As the co-signing therapist, I have reviewed the student's documentation and am responsible for the treatment, assessment, and plan.      MARTHA Del Rio, JET

## 2018-10-24 ENCOUNTER — CLINICAL SUPPORT (OUTPATIENT)
Dept: REHABILITATION | Facility: HOSPITAL | Age: 53
End: 2018-10-24
Attending: ORTHOPAEDIC SURGERY
Payer: MEDICAID

## 2018-10-24 DIAGNOSIS — R53.1 WEAKNESS: ICD-10-CM

## 2018-10-24 PROCEDURE — 97530 THERAPEUTIC ACTIVITIES: CPT

## 2018-10-24 NOTE — PROGRESS NOTES
"                            Occupational Therapy Progress Note     Name: Trudi Howard  Clinic Number: [id number]    Therapy Diagnosis:   Encounter Diagnosis   Name Primary?    Weakness      Physician: Kenneth Tang Jr., *    Physician Orders: Eval and Treat; PROM and AROM  Medical Diagnosis:   M25.531 (ICD-10-CM) - Right wrist pain   S62.101D (ICD-10-CM) - Closed fracture of right wrist with routine healing, subsequent encounter        Surgical Procedure and Date: N/A,   Evaluation Date: 6/11/2018  Insurance Authorization Period Expiration: 12/31/2018, charge as TA  Plan of Care Certification Period:  8/11/2018 - 10/11/2018     Date of Return to MD: 11/12/2018    Imaging 7/2/2018: X-RAYS:  AP and lateral right hand healed distal radius fracture, good position,   nondisplaced.    Visit date: 10/24/2018  Visit # / Visits authorized: 14/20  Time In:10:15 AM  Time Out: 11:15 AM  Total Billable Time: 60 minutes  Charges: 4 TA    Precautions: Standard    Subjective     Pt reports that she fell into the refrigerator door and caught her right ring finger in the handle.  Response to previous treatment: "I feel like my hand is moving better."  Functional change: Pt reports that she is able to turn the steering wheel easier.    Pain: 1-2/10 prior to fall, 6/10 currently  Location: right wrist      Objective        Edema. Measured in centimeters.    6/11/2018 6/11/2018 6/29/2018 7/27/2018 8/23/2018 9/20/2018 10/5/2018     Left Right Right Right Right Right Right   Wrist Crease 18 19.7 19.5 19.5 19.5 19.3 19.3   Midpalm 20.9 22 22 22 22 22 21.5   MCPs 20 21.4 21.4 21.4 20.5 20.5 20.5      Edema. Measured in centimeters.    6/11/2018 6/11/2018 6/29/2018 7/27/2018 8/23/2018 9/20/2018 10/5/2018     Left Right Right Right Right Right Right   Index:              P1 6.7 7.4 7.4 7.4 7.5 7.5 7.5   Long:              P1 6.6 7.2 7.2 7.2 7.0 7.0 7.0   Ring:              P1            6.7 7 7 7 7.0 7.0 7.0   Small:               P1      "      6.1 6.3 6.3 6.3 6.5 6.5 6.5   Thumb:            Prox. Phalanx 7.2 7.2 7.2 7.2 7.2 7.2 7.2      Elbow and Wrist ROM. Measured in degrees.    6/11/2018 6/11/2018 6/29/2018 7/27/2018 8/23/2018 9/20/2018 10/5/2018     Left Right Right Right Right Right Right   Elbow Ext/Flex WNL WNL WNL WNL WNL WNL WNL   Supination/Pronation 50/90 50/90 60/90 85/90 85/90 85/90 WNL   Wrist Ext/Flex 70/65 36/46 50/40 50/55 50/60 55/65 55/65   Wrist RD/UD 25/60 10/26 10/25 15/30 15/35 20/35 20/35      Hand ROM. Measured in degrees.    6/11/2018 6/11/2018 6/29/2018 7/27/2018 8/23/2018 9/20/2018 10/5/2018     Left Right Right Right Right Right Right                Index: MP  77 50 50 60 60 60 60              PIP     89 79 80 86 90 93 93              DIP 70 40 45 50 60 60 60              DURAN 236 169 175 196 210 213 213                Long:  MP 80 44 47 55 55 55 55              PIP 86 84 80 82 90 90 90              DIP 80 48 50 55 60 60 60              DURAN 246 176 177 192 205 205 205                Ring:   MP 79 40 40 45 50 50 50              PIP 90 86 80 80 89 90 90              DIP 76 64 64 65 70 70 70              DURAN 245 190 184 190 209 210 210                Small:  MP 77 47 35 42 42 44 44               PIP 88 78 78 78 78 85 85               DIP 85 70 70 70 70 70 70              DURAN 250 195 183 190 190 199 199                Thumb: MP 45 26 45 50 50 50 50                IP 66 44 45 55 60 65 65       Rad ADD/ABD 65 65 65 65 65 65 65       Pal ADD/ABD 55 60 60 60 60 60 60          Opposition To distal palmar crease To mid distal phalanx of SF 3cm 2 cm 1 cm Base of 5th Base of 5th       Strength (Dyanmometer) and Pinch Strength (Pinch Gauge)  Measured in pounds.    6/11/2018 6/11/2018 7/13/2018 7/13/2018 8/23/2018 9/20/2018 10/5/2018     Left Right Left Right Right Right Right   Rung II defer defer 40 6 15 15 20   Mendieta Pinch     12 5 8 8 8   3pt Pinch     13 3 5 5 5   2pt Pinch     10 5 3 3 4           Trudi received therapeutic  activities for 60  minutes including: fluidotherapy, RM and finger, thumb, wrist and forearm AROM as listed in the media section x 10 reps each  -Hand gripper with 1 red, 1 green and 1 yellow bands x 30 reps  -Wrist 3 ways with 3 # 2  x 15  reps  -2 plates on rotation bar x 15 reps   -Green digi-extend x 30 reps  -green digiflex x 30 reps      Home Exercises and Education Provided     Education provided:   -Written home exercise program as listed in the patient instruction's section.      Assessment      Pt tolerated all exercises with mild c/o pain.  Continued functional activities. Pt continues to be non-compliant with her HEP and was instructed again on the importance of performing her HEP. Advanced strengthening exercises with no difficulty.     Trudi is progressing well towards her goals and there are no updates to goals at this time. Pt prognosis is Fair.    Pt will continue to benefit from skilled outpatient occupational therapy to address the deficits listed in the problem list on initial evaluation provide pt/family education and to maximize pt's level of independence in the home and community environment.     Anticipated barriers to occupational therapy: attention span and lack of performing HEP as instructed    Pt's spiritual, cultural and educational needs considered and pt agreeable to plan of care and goals.    Goals:  Long Term Goals (LTGs); to be met by discharge.  LTG #1: Pt will report a pain level of 1 out of 10 with activity.-  progressing         LTG #2: Pt will demo improved FOTO score by reaching CK G-Code. - progressing  LTG #3: Pt will return to prior level of function for ADLs and household management.-  progressing     Short Term Goals (STGs); to be met within 4 weeks (7/11/2018).  STG #1a: Pt will report 2 out of 10 pain level at rest. - not met, progressing  STG #2a: Pt will report/demo [place] with cooking. Not met, progressing  STG #2b: Pt will report/demo [place] with bathing  and grooming. Partially met with bathing, met with grooming - progressing  STG #3a: Pt will demonstrate independence with issued HEP. - met 9/18/2018  STG #3b: Pt will demo improved wrist flexion and extension by 15 degrees each, needed to aid with dressing. Met 9/18/2018    Plan: Cont with OT.  Re- assess next session.   Pt to be treated by Occupational Therapy 2 times per week for 8 weeks during the certification period from 10/11/2018 to 12/11/2018 to achieve the established goals.      Discussed Plan of Care with patient: Yes  Updates/Grading for next session: Advance as tolerated      MARTHA Del Rio, CHT

## 2018-10-29 ENCOUNTER — CLINICAL SUPPORT (OUTPATIENT)
Dept: REHABILITATION | Facility: HOSPITAL | Age: 53
End: 2018-10-29
Attending: ORTHOPAEDIC SURGERY
Payer: MEDICAID

## 2018-10-29 DIAGNOSIS — R53.1 WEAKNESS: ICD-10-CM

## 2018-10-29 PROCEDURE — 97530 THERAPEUTIC ACTIVITIES: CPT

## 2018-10-29 NOTE — PROGRESS NOTES
"                            Occupational Therapy Progress Note     Name: Trudi Howard  Clinic Number: [id number]    Therapy Diagnosis:   Encounter Diagnosis   Name Primary?    Weakness      Physician: Kenneth Tang Jr., *    Physician Orders: Eval and Treat; PROM and AROM  Medical Diagnosis:   M25.531 (ICD-10-CM) - Right wrist pain   S62.101D (ICD-10-CM) - Closed fracture of right wrist with routine healing, subsequent encounter        Surgical Procedure and Date: N/A,   Evaluation Date: 6/11/2018  Insurance Authorization Period Expiration: 12/31/2018, charge as TA  Plan of Care Certification Period:  8/11/2018 - 10/11/2018     Date of Return to MD: 11/12/2018    Imaging 7/2/2018: X-RAYS:  AP and lateral right hand healed distal radius fracture, good position,   nondisplaced.    Visit date: 10/24/2018  Visit # / Visits authorized: 14/20  Time In:11:00 AM  Time Out: 12:00 PM  Total Billable Time: 60 minutes  Charges: 4 TA    Precautions: Standard    Subjective     Pt reports that she is doing her putty exercises only.  Response to previous treatment: "I feel like my hand is moving better."  Functional change: Pt reports that she is able to turn the steering wheel easier.    Pain: 1-2/10 prior to fall, 6/10 currently  Location: right wrist      Objective        Edema. Measured in centimeters.    6/11/2018 6/11/2018 6/29/2018 7/27/2018 8/23/2018 9/20/2018 10/5/2018     Left Right Right Right Right Right Right   Wrist Crease 18 19.7 19.5 19.5 19.5 19.3 19.3   Midpalm 20.9 22 22 22 22 22 21.5   MCPs 20 21.4 21.4 21.4 20.5 20.5 20.5      Edema. Measured in centimeters.    6/11/2018 6/11/2018 6/29/2018 7/27/2018 8/23/2018 9/20/2018 10/5/2018     Left Right Right Right Right Right Right   Index:              P1 6.7 7.4 7.4 7.4 7.5 7.5 7.5   Long:              P1 6.6 7.2 7.2 7.2 7.0 7.0 7.0   Ring:              P1            6.7 7 7 7 7.0 7.0 7.0   Small:               P1           6.1 6.3 6.3 6.3 6.5 6.5 6.5   Thumb:   "          Prox. Phalanx 7.2 7.2 7.2 7.2 7.2 7.2 7.2      Elbow and Wrist ROM. Measured in degrees.    6/11/2018 6/11/2018 6/29/2018 7/27/2018 8/23/2018 9/20/2018 10/5/2018     Left Right Right Right Right Right Right   Elbow Ext/Flex WNL WNL WNL WNL WNL WNL WNL   Supination/Pronation 50/90 50/90 60/90 85/90 85/90 85/90 WNL   Wrist Ext/Flex 70/65 36/46 50/40 50/55 50/60 55/65 55/65   Wrist RD/UD 25/60 10/26 10/25 15/30 15/35 20/35 20/35      Hand ROM. Measured in degrees.    6/11/2018 6/11/2018 6/29/2018 7/27/2018 8/23/2018 9/20/2018 10/5/2018     Left Right Right Right Right Right Right                Index: MP  77 50 50 60 60 60 60              PIP     89 79 80 86 90 93 93              DIP 70 40 45 50 60 60 60              DURAN 236 169 175 196 210 213 213                Long:  MP 80 44 47 55 55 55 55              PIP 86 84 80 82 90 90 90              DIP 80 48 50 55 60 60 60              DURAN 246 176 177 192 205 205 205                Ring:   MP 79 40 40 45 50 50 50              PIP 90 86 80 80 89 90 90              DIP 76 64 64 65 70 70 70              DURAN 245 190 184 190 209 210 210                Small:  MP 77 47 35 42 42 44 44               PIP 88 78 78 78 78 85 85               DIP 85 70 70 70 70 70 70              DURAN 250 195 183 190 190 199 199                Thumb: MP 45 26 45 50 50 50 50                IP 66 44 45 55 60 65 65       Rad ADD/ABD 65 65 65 65 65 65 65       Pal ADD/ABD 55 60 60 60 60 60 60          Opposition To distal palmar crease To mid distal phalanx of SF 3cm 2 cm 1 cm Base of 5th Base of 5th       Strength (Dyanmometer) and Pinch Strength (Pinch Gauge)  Measured in pounds.    6/11/2018 6/11/2018 7/13/2018 7/13/2018 8/23/2018 9/20/2018 10/5/2018     Left Right Left Right Right Right Right   Rung II defer defer 40 6 15 15 20   Mendieta Pinch     12 5 8 8 8   3pt Pinch     13 3 5 5 5   2pt Pinch     10 5 3 3 4           Trudi received therapeutic activities for 60  minutes including:  fluidotherapy, RM and finger, thumb, wrist and forearm AROM as listed in the media section x 10 reps each  -Hand gripper with 1 red, 1 green and 1 yellow bands x 30 reps  -Wrist 3 ways with 3 # 2  x 15  reps  -2 plates on rotation bar x 15 reps   -Green digi-extend x 30 reps  -green digiflex x 30 reps  -red putty - 2' molding, 15 grasps, 5 flowers, 3 logs each (key and pinch  - red powerweb x 30 reps      Home Exercises and Education Provided     Education provided:   -Written home exercise program as listed in the patient instruction's section.      Assessment      Pt tolerated all exercises with mild c/o pain.  Continued functional activities. Pt continues to be non-compliant with her HEP and was instructed again on the importance of performing her HEP. Advanced strengthening exercises with no difficulty.     Truid is progressing well towards her goals and there are no updates to goals at this time. Pt prognosis is Fair.    Pt will continue to benefit from skilled outpatient occupational therapy to address the deficits listed in the problem list on initial evaluation provide pt/family education and to maximize pt's level of independence in the home and community environment.     Anticipated barriers to occupational therapy: attention span and lack of performing HEP as instructed    Pt's spiritual, cultural and educational needs considered and pt agreeable to plan of care and goals.    Goals:  Long Term Goals (LTGs); to be met by discharge.  LTG #1: Pt will report a pain level of 1 out of 10 with activity.-  progressing         LTG #2: Pt will demo improved FOTO score by reaching CK G-Code. - progressing  LTG #3: Pt will return to prior level of function for ADLs and household management.-  progressing     Short Term Goals (STGs); to be met within 4 weeks (7/11/2018).  STG #1a: Pt will report 2 out of 10 pain level at rest. - not met, progressing  STG #2a: Pt will report/demo [place] with cooking. Not met,  progressing  STG #2b: Pt will report/demo [place] with bathing and grooming. Partially met with bathing, met with grooming - progressing  STG #3a: Pt will demonstrate independence with issued HEP. - met 9/18/2018  STG #3b: Pt will demo improved wrist flexion and extension by 15 degrees each, needed to aid with dressing. Met 9/18/2018    Plan: Cont with OT.  Re- assess next session.   Pt to be treated by Occupational Therapy 2 times per week for 8 weeks during the certification period from 10/11/2018 to 12/11/2018 to achieve the established goals.      Discussed Plan of Care with patient: Yes  Updates/Grading for next session: Advance as tolerated      MARTHA Del Rio, JET

## 2018-10-31 ENCOUNTER — CLINICAL SUPPORT (OUTPATIENT)
Dept: REHABILITATION | Facility: HOSPITAL | Age: 53
End: 2018-10-31
Attending: ORTHOPAEDIC SURGERY
Payer: MEDICAID

## 2018-10-31 DIAGNOSIS — R53.1 WEAKNESS: ICD-10-CM

## 2018-10-31 PROCEDURE — 97530 THERAPEUTIC ACTIVITIES: CPT

## 2018-10-31 NOTE — PROGRESS NOTES
"                            Occupational Therapy Progress Note     Name: Trudi Howard  Clinic Number: [id number]    Therapy Diagnosis:   Encounter Diagnosis   Name Primary?    Weakness      Physician: Kenneth Tang Jr., *    Physician Orders: Eval and Treat; PROM and AROM  Medical Diagnosis:   M25.531 (ICD-10-CM) - Right wrist pain   S62.101D (ICD-10-CM) - Closed fracture of right wrist with routine healing, subsequent encounter        Surgical Procedure and Date: N/A  Evaluation Date: 6/11/2018  Insurance Authorization Period Expiration: 12/31/2018, charge as TA  Plan of Care Certification Period:  8/11/2018 - 10/11/2018     Date of Return to MD: 11/12/2018    Imaging 7/2/2018: X-RAYS:  AP and lateral right hand healed distal radius fracture, good position, nondisplaced.    Visit date: 10/24/2018  Visit # / Visits authorized: 16/20  Time In:10:15 AM  Time Out: 11:15 AM  Total Billable Time: 60 minutes  Charges: 4 TA    Precautions: Standard    Subjective     Pt reports that she is doing her putty exercises only.  Response to previous treatment: "My wrist feels ok today."  Functional change: Decreased pain    Pain: 3-4/10  Location: right wrist      Objective        Edema. Measured in centimeters.    6/11/2018 6/11/2018 6/29/2018 7/27/2018 8/23/2018 9/20/2018 10/5/2018     Left Right Right Right Right Right Right   Wrist Crease 18 19.7 19.5 19.5 19.5 19.3 19.3   Midpalm 20.9 22 22 22 22 22 21.5   MCPs 20 21.4 21.4 21.4 20.5 20.5 20.5      Edema. Measured in centimeters.    6/11/2018 6/11/2018 6/29/2018 7/27/2018 8/23/2018 9/20/2018 10/5/2018     Left Right Right Right Right Right Right   Index:              P1 6.7 7.4 7.4 7.4 7.5 7.5 7.5   Long:              P1 6.6 7.2 7.2 7.2 7.0 7.0 7.0   Ring:              P1            6.7 7 7 7 7.0 7.0 7.0   Small:               P1           6.1 6.3 6.3 6.3 6.5 6.5 6.5   Thumb:            Prox. Phalanx 7.2 7.2 7.2 7.2 7.2 7.2 7.2      Elbow and Wrist ROM. Measured in " degrees.    6/11/2018 6/11/2018 6/29/2018 7/27/2018 8/23/2018 9/20/2018 10/5/2018     Left Right Right Right Right Right Right   Elbow Ext/Flex WNL WNL WNL WNL WNL WNL WNL   Supination/Pronation 50/90 50/90 60/90 85/90 85/90 85/90 WNL   Wrist Ext/Flex 70/65 36/46 50/40 50/55 50/60 55/65 55/65   Wrist RD/UD 25/60 10/26 10/25 15/30 15/35 20/35 20/35      Hand ROM. Measured in degrees.    6/11/2018 6/11/2018 6/29/2018 7/27/2018 8/23/2018 9/20/2018 10/5/2018     Left Right Right Right Right Right Right                Index: MP  77 50 50 60 60 60 60              PIP     89 79 80 86 90 93 93              DIP 70 40 45 50 60 60 60              DURAN 236 169 175 196 210 213 213                Long:  MP 80 44 47 55 55 55 55              PIP 86 84 80 82 90 90 90              DIP 80 48 50 55 60 60 60              DURAN 246 176 177 192 205 205 205                Ring:   MP 79 40 40 45 50 50 50              PIP 90 86 80 80 89 90 90              DIP 76 64 64 65 70 70 70              DURAN 245 190 184 190 209 210 210                Small:  MP 77 47 35 42 42 44 44               PIP 88 78 78 78 78 85 85               DIP 85 70 70 70 70 70 70              DURAN 250 195 183 190 190 199 199                Thumb: MP 45 26 45 50 50 50 50                IP 66 44 45 55 60 65 65       Rad ADD/ABD 65 65 65 65 65 65 65       Pal ADD/ABD 55 60 60 60 60 60 60          Opposition To distal palmar crease To mid distal phalanx of SF 3cm 2 cm 1 cm Base of 5th Base of 5th       Strength (Dyanmometer) and Pinch Strength (Pinch Gauge)  Measured in pounds.    6/11/2018 6/11/2018 7/13/2018 7/13/2018 8/23/2018 9/20/2018 10/5/2018     Left Right Left Right Right Right Right   Rung II defer defer 40 6 15 15 20   Mendieta Pinch     12 5 8 8 8   3pt Pinch     13 3 5 5 5   2pt Pinch     10 5 3 3 4           Trudi received therapeutic activities for 60  minutes including: fluidotherapy, RM and finger, thumb, wrist and forearm AROM as listed in the media section x 10 reps  each  -Hand gripper with 1 red, 1 green and 1 yellow bands x 30 reps  -Wrist 3 ways with 3 # 2  x 15  reps  -2 plates on rotation bar x 15 reps   -Green digi-extend x 30 reps  -Green digiflex x 30 reps  -Red putty - 2' molding, 15 grasps, 5 flowers, 3 logs each (key and pinch  -Red powerweb x 30 reps      Home Exercises and Education Provided     Education provided:   -Written home exercise program as listed in the patient instruction's section.      Assessment     Pt tolerated all exercises with mild c/o pain. Continue functional activities. Will re-assess next session due to pt showing up to therapy 15 minutes late. Advance strengthening exercises as tolerated.    Trudi is progressing well towards her goals and there are no updates to goals at this time. Pt prognosis is Fair.    Pt will continue to benefit from skilled outpatient occupational therapy to address the deficits listed in the problem list on initial evaluation provide pt/family education and to maximize pt's level of independence in the home and community environment.     Anticipated barriers to occupational therapy: Attention span    Pt's spiritual, cultural and educational needs considered and pt agreeable to plan of care and goals.    Goals:  Long Term Goals (LTGs); to be met by discharge.  LTG #1: Pt will report a pain level of 1 out of 10 with activity.-  progressing         LTG #2: Pt will demo improved FOTO score by reaching CK G-Code. - progressing  LTG #3: Pt will return to prior level of function for ADLs and household management.-  progressing     Short Term Goals (STGs); to be met within 4 weeks (7/11/2018).  STG #1a: Pt will report 2 out of 10 pain level at rest. - not met, progressing  STG #2a: Pt will report/demo [place] with cooking. Not met, progressing  STG #2b: Pt will report/demo [place] with bathing and grooming. Partially met with bathing, met with grooming - progressing  STG #3a: Pt will demonstrate independence with  issued HEP. - met 9/18/2018  STG #3b: Pt will demo improved wrist flexion and extension by 15 degrees each, needed to aid with dressing. Met 9/18/2018    Plan: Cont with OT.  Re- assess next session. Pt late for therapy today.   Pt to be treated by Occupational Therapy 2 times per week for 8 weeks during the certification period from 10/11/2018 to 12/11/2018 to achieve the established goals.      Discussed Plan of Care with patient: Yes  Updates/Grading for next session: Advance as tolerated    GUILLERMO Mason Student    I certify that I was present in the room directing the student in service delivery and guiding them using my skilled judgment.  As the co-signing therapist, I have reviewed the student's documentation and am responsible for the treatment, assessment, and plan.        MARTHA Del Rio, JET

## 2018-11-05 ENCOUNTER — OFFICE VISIT (OUTPATIENT)
Dept: INFECTIOUS DISEASES | Facility: CLINIC | Age: 53
End: 2018-11-05
Payer: MEDICAID

## 2018-11-05 ENCOUNTER — TELEPHONE (OUTPATIENT)
Dept: INFECTIOUS DISEASES | Facility: CLINIC | Age: 53
End: 2018-11-05

## 2018-11-05 VITALS
HEIGHT: 69 IN | TEMPERATURE: 97 F | SYSTOLIC BLOOD PRESSURE: 122 MMHG | HEART RATE: 80 BPM | OXYGEN SATURATION: 97 % | WEIGHT: 228 LBS | DIASTOLIC BLOOD PRESSURE: 80 MMHG | BODY MASS INDEX: 33.77 KG/M2

## 2018-11-05 DIAGNOSIS — Z01.818 PRE-OPERATIVE EXAM: Primary | ICD-10-CM

## 2018-11-05 DIAGNOSIS — K43.9 VENTRAL HERNIA WITHOUT OBSTRUCTION OR GANGRENE: ICD-10-CM

## 2018-11-05 DIAGNOSIS — E66.09 OBESITY DUE TO EXCESS CALORIES WITH SERIOUS COMORBIDITY, UNSPECIFIED CLASSIFICATION: ICD-10-CM

## 2018-11-05 DIAGNOSIS — Z87.898 HISTORY OF ABDOMINAL ABSCESS: ICD-10-CM

## 2018-11-05 LAB
MRSA SCREEN BY PCR: NORMAL

## 2018-11-05 PROCEDURE — 99215 OFFICE O/P EST HI 40 MIN: CPT | Mod: ,,, | Performed by: INTERNAL MEDICINE

## 2018-11-05 NOTE — PATIENT INSTRUCTIONS
Obtain your CPAP and starting using it and bring it with you to the hospital    MRSA screen today at [facility]    I will recommend to Dr. lieberman that if the MRSA screen is negative, to use ancef for your surgical prophylaxis (she has penicillin as an allergy, but is tolerant of cephalosporins).     If the MRSA screen is positive, we will decolonize you with intranasal bactroban(mupirocin), chlorhexidine showers, and chlorhexidine oral rinse x 5 days pre-op  , and add Vancomycin 15 mg/kg IV x 1 to the ancef.       ADDENDUM: MRSA screen was negative.

## 2018-11-05 NOTE — PROGRESS NOTES
Subjective:       Patient ID: Trudi Howard is a 53 y.o. female.    Chief Complaint:: Incisional Hernia (Consult )    HPI seen last year when she was hospitalized for ruptured bladder and infected urinoma requiring percutaneous drainage and exploratory laparotomy. She had a very prolonged hospitalization and sepsis. She has developed ventral hernias which need repair. Dr. Martin obtained a CT scan 10/12 which did not show anything that would preclude hernia repair. She has been referred for ID clearance and suggestions re: reducing operative risk.   No history of STaph skin infections  NO recent infections of any kind in the last several months  She reports receiving her influenza vaccine       Review of patient's allergies indicates:   Allergen Reactions    Aspirin      Contraindicated with other meds    Amoxicillin      rash    Milk containing products     Peanut      Past Medical History:   Diagnosis Date    History of uterine fibroid     Intra-abdominal abscess 2017    Urinary retention, ruptured bladder, infected urinoma, sepsis    Morbid obesity     RUSSELL (obstructive sleep apnea)     Ovarian cyst 2017    Seizures      Past Surgical History:   Procedure Laterality Date    BREAST SURGERY      BUNIONECTOMY       SECTION      Exploratory Laparotomy w/ Abscess Drainage  2017        fibroidectomy       Social History     Tobacco Use    Smoking status: Never Smoker    Smokeless tobacco: Never Used   Substance Use Topics    Alcohol use: Yes     Comment: seldom     Social History     Occupational History    Not on file     Family History   Adopted: Yes   Problem Relation Age of Onset    Heart disease Mother          Review of Systems    Constitutional: No fever, chills, sweats, fatigue, weakness, weight loss and she has not been trying    Eyes: No change in vision,     ENT: No sore throat, mouth pains, or lesions    Cardiovascular: No chest pain,   Respiratory: No shortness  "of breath, MARTINEZ, cough, wheeze, sputum, but she is noncompliant with C Pap  Gastrointestinal: No abdominal pain, nausea, vomiting, diarrhea, or focal abdominal pain  Genitourinary: No dysuria, hematuria, strangury, retention,  Musculoskeletal: No new pain, joint swelling, or injuries  Integumentary: No new rashes, skin lesions, or wounds  Neurological: No dizziness, vertigo, unusual headaches,  Psychiatric: No anxiety, depression  Endocrine: No diabetes. Not up to date with mammogram  Lymphatic: No lymphadenopathy, blood loss, anemia, malignancy      Objective:      Blood pressure 122/80, pulse 80, temperature 96.6 °F (35.9 °C), temperature source Oral, height 5' 9" (1.753 m), weight 103.4 kg (228 lb), SpO2 97 %. Body mass index is 33.67 kg/m².  Physical Exam      General: Alert and attentive, cooperative and in no distress    Eyes: Pupils equal, round, reactive to light, anicteric, EOMI    Neck: Supple, non-tender, no thyromegaly or masses    ENT: EAC patent, TM normal, nares patent, no oral lesions, teeth in good condition, no thrush    Cardiovascular: Regular rate and rhythm, no murmurs, rubs, or gallop    Respiratory: Lungs clear without wheezes, rales, rubs or rhonci    Gastrointestinal:  Soft, obese, bowel sounds normal,  no mass or organomegaly, no tenderness or distention. Ventral hernias are obvious, non tender    Genitourinary: no flank tenderness    Integumentary: Skin without rashes, lesions, or wounds, and no folliculitis, intertrigo    Vascular: No peripheral edema or phlebitis, warm and well perfused    Musculoskeletal: Ambulates without difficulty, no acute arthritis, synovitis or myositis. Normal muscle bulk and strength    Lymphatic: No cervical LAD but has one prominent node right axilla    Neurological: Normal LOC, cranial nerves, speech, reflexes, gait    Psychiatric: Normal mood, speech,  demeanor     Wound:    VAD:        Recent Diagnostics:  MRSA screen negative 11/5  Reviewed hospital records " from 4/2017       Assessment and Plan:           Pre-operative exam  -     MRSA Screen by PCR    Ventral hernia without obstruction or gangrene    Obesity due to excess calories with serious comorbidity, unspecified classification    History of abdominal abscess    also has right axillary node and not UTD with mammogram    PLAN:  Obtain your CPAP and starting using it and bring it with you to the hospital. Discussed that this can influence her ability to come out from anesthesia safely and can be worsened when opiates are needed    MRSA screen today at [facility]    I will recommend to Dr. lieberman that if the MRSA screen is negative, to use ancef for your surgical prophylaxis (she has penicillin as an allergy, but is tolerant of cephalosporins).     If the MRSA screen is positive, we will decolonize you with intranasal bactroban(mupirocin), chlorhexidine showers, and chlorhexidine oral rinse x 5 days pre-op  , and add Vancomycin 15 mg/kg IV x 1 to the ancef.     Encouraged to get her mammogram also      ADDENDUM: MRSA screen was negative.  This note was created using Dragon voice recognition software that occasionally misinterpreted phrases or words.

## 2018-11-05 NOTE — TELEPHONE ENCOUNTER
----- Message from Marybeth Milan MD sent at 11/5/2018  3:32 PM CST -----  Please let her know that her MRSA Staph test was negative

## 2018-11-08 ENCOUNTER — OFFICE VISIT (OUTPATIENT)
Dept: SURGERY | Facility: CLINIC | Age: 53
End: 2018-11-08
Payer: MEDICAID

## 2018-11-08 VITALS
BODY MASS INDEX: 41.18 KG/M2 | DIASTOLIC BLOOD PRESSURE: 80 MMHG | WEIGHT: 278 LBS | HEIGHT: 69 IN | SYSTOLIC BLOOD PRESSURE: 122 MMHG

## 2018-11-08 DIAGNOSIS — K43.2 INCISIONAL HERNIA, WITHOUT OBSTRUCTION OR GANGRENE: Primary | ICD-10-CM

## 2018-11-08 PROCEDURE — 99213 OFFICE O/P EST LOW 20 MIN: CPT | Mod: ,,, | Performed by: SURGERY

## 2018-11-08 NOTE — LETTER
November 8, 2018      Dejon Rollins MD  [address]  [address]  [place] [address]           [facility]  [address]  [address]  [facility] [facility] [address]  Phone: [phone]  Fax: [phone]          Patient: Trudi Howard   MR Number: [id number]   YOB: 1965   Date of Visit: 11/8/2018       Dear Dr. Dejon Rollins:    Thank you for referring Trudi Howard to me for evaluation. Attached you will find relevant portions of my assessment and plan of care.    If you have questions, please do not hesitate to call me. I look forward to following Trudi Howard along with you.    Sincerely,    Navid BORJA MD    Enclosure  CC:  No Recipients    If you would like to receive this communication electronically, please contact [email or web address] or [phone] to request more information on [employer] access.    For providers and/or their staff who would like to refer a patient to [facility], please contact us through our one-stop-shop provider referral line, [facility], at [phone].    If you feel you have received this communication in error or would no longer like to receive these types of communications, please e-mail [email or web address]

## 2018-11-08 NOTE — PROGRESS NOTES
Patient came in to discuss surgery. CT scan of the abdomen done and showed no other problems except hernia. ID consult done. Operative plan discussed with patient at length. We'll plan to do a laparoscopic incisional hernia repair. Patient is aware she is high risk due to her history and morbid obesity. Patient does not want to have the procedure until beginning of next year. Patient has to return at that time for preop.

## 2018-11-15 ENCOUNTER — OFFICE VISIT (OUTPATIENT)
Dept: FAMILY MEDICINE | Facility: HOSPITAL | Age: 53
End: 2018-11-15
Attending: FAMILY MEDICINE
Payer: MEDICAID

## 2018-11-15 VITALS
HEIGHT: 69 IN | DIASTOLIC BLOOD PRESSURE: 91 MMHG | BODY MASS INDEX: 40.49 KG/M2 | SYSTOLIC BLOOD PRESSURE: 143 MMHG | WEIGHT: 273.38 LBS | HEART RATE: 91 BPM

## 2018-11-15 DIAGNOSIS — J06.9 UPPER RESPIRATORY TRACT INFECTION, UNSPECIFIED TYPE: Primary | ICD-10-CM

## 2018-11-15 PROCEDURE — 99213 OFFICE O/P EST LOW 20 MIN: CPT | Performed by: FAMILY MEDICINE

## 2018-11-15 RX ORDER — AZITHROMYCIN 250 MG/1
TABLET, FILM COATED ORAL
Qty: 6 TABLET | Refills: 0 | Status: SHIPPED | OUTPATIENT
Start: 2018-11-15 | End: 2018-11-20

## 2018-11-15 RX ORDER — CETIRIZINE HYDROCHLORIDE 10 MG/1
10 TABLET ORAL DAILY
Qty: 30 TABLET | Refills: 2 | Status: SHIPPED | OUTPATIENT
Start: 2018-11-15

## 2018-11-15 RX ORDER — FLUTICASONE PROPIONATE 50 MCG
1 SPRAY, SUSPENSION (ML) NASAL DAILY
Qty: 1 BOTTLE | Refills: 2 | Status: SHIPPED | OUTPATIENT
Start: 2018-11-15 | End: 2019-08-16

## 2018-11-15 RX ORDER — BENZONATATE 100 MG/1
100 CAPSULE ORAL 3 TIMES DAILY PRN
Qty: 90 CAPSULE | Refills: 0 | Status: SHIPPED | OUTPATIENT
Start: 2018-11-15 | End: 2018-11-25

## 2018-11-15 RX ORDER — ALBUTEROL SULFATE 90 UG/1
2 AEROSOL, METERED RESPIRATORY (INHALATION) EVERY 6 HOURS PRN
Qty: 18 G | Refills: 0 | Status: SHIPPED | OUTPATIENT
Start: 2018-11-15 | End: 2019-11-15

## 2018-11-15 NOTE — PROGRESS NOTES
Subjective:       Patient ID: Trudi Howard is a 53 y.o. female.    Chief Complaint: URI    URI    This is a new problem. The current episode started yesterday. The problem has been unchanged. There has been no fever. Associated symptoms include congestion, coughing, rhinorrhea and wheezing. She has tried NSAIDs for the symptoms. The treatment provided mild relief.     Review of Systems   HENT: Positive for congestion and rhinorrhea.    Respiratory: Positive for cough and wheezing.        Objective:      Vitals:    11/15/18 1559   BP: (!) 143/91   Pulse: 91     Physical Exam   Constitutional: She is oriented to person, place, and time. She appears well-developed and well-nourished. No distress.   HENT:   Head: Normocephalic and atraumatic.   Right Ear: External ear normal.   Left Ear: External ear normal.   Nose: Mucosal edema and rhinorrhea present.   Mouth/Throat: Oropharynx is clear and moist. No oropharyngeal exudate.   Eyes: Conjunctivae and EOM are normal. Pupils are equal, round, and reactive to light. Right eye exhibits no discharge. Left eye exhibits no discharge. No scleral icterus.   Neck: Normal range of motion. No JVD present. No thyromegaly present.   Cardiovascular: Normal rate, regular rhythm, normal heart sounds and intact distal pulses. Exam reveals no gallop and no friction rub.   No murmur heard.  Pulmonary/Chest: Effort normal. No respiratory distress. She has wheezes. She has no rales. She exhibits no tenderness.   Abdominal: Soft. Bowel sounds are normal. She exhibits no distension and no mass. There is no tenderness. There is no rebound and no guarding. No hernia.   Musculoskeletal: Normal range of motion.   Neurological: She is alert and oriented to person, place, and time. No cranial nerve deficit.   Skin: Skin is warm and dry. She is not diaphoretic.   Psychiatric: She has a normal mood and affect. Her behavior is normal.   Vitals reviewed.      Assessment:       1. Upper respiratory  tract infection, unspecified type        Plan:       Upper respiratory tract infection, unspecified type  -     azithromycin (Z-HUGH) 250 MG tablet; Take 2 tablets by mouth on day 1; Take 1 tablet by mouth on days 2-5  Dispense: 6 tablet; Refill: 0  -     fluticasone (FLONASE) 50 mcg/actuation nasal spray; 1 spray (50 mcg total) by Each Nare route once daily.  Dispense: 1 Bottle; Refill: 2  -     cetirizine (ZYRTEC) 10 MG tablet; Take 1 tablet (10 mg total) by mouth once daily.  Dispense: 30 tablet; Refill: 2  -     benzonatate (TESSALON) 100 MG capsule; Take 1 capsule (100 mg total) by mouth 3 (three) times daily as needed for Cough.  Dispense: 90 capsule; Refill: 0  -     albuterol (VENTOLIN HFA) 90 mcg/actuation inhaler; Inhale 2 puffs into the lungs every 6 (six) hours as needed for Wheezing. Rescue  Dispense: 18 g; Refill: 0      Follow-up in about 1 week (around 11/22/2018), or if symptoms worsen or fail to improve.

## 2018-11-19 ENCOUNTER — OFFICE VISIT (OUTPATIENT)
Dept: URGENT CARE | Facility: CLINIC | Age: 53
End: 2018-11-19
Payer: MEDICAID

## 2018-11-19 VITALS
DIASTOLIC BLOOD PRESSURE: 94 MMHG | RESPIRATION RATE: 18 BRPM | TEMPERATURE: 99 F | WEIGHT: 271 LBS | OXYGEN SATURATION: 98 % | HEART RATE: 84 BPM | BODY MASS INDEX: 40.14 KG/M2 | SYSTOLIC BLOOD PRESSURE: 148 MMHG | HEIGHT: 69 IN

## 2018-11-19 DIAGNOSIS — J06.9 UPPER RESPIRATORY TRACT INFECTION, UNSPECIFIED TYPE: Primary | ICD-10-CM

## 2018-11-19 PROCEDURE — 99214 OFFICE O/P EST MOD 30 MIN: CPT | Mod: S$GLB,,, | Performed by: PHYSICIAN ASSISTANT

## 2018-11-19 RX ORDER — PROMETHAZINE HYDROCHLORIDE AND CODEINE PHOSPHATE 6.25; 1 MG/5ML; MG/5ML
5 SOLUTION ORAL EVERY 4 HOURS PRN
Qty: 118 ML | Refills: 0 | Status: SHIPPED | OUTPATIENT
Start: 2018-11-19 | End: 2019-01-18

## 2018-11-20 NOTE — PROGRESS NOTES
"Subjective:       Patient ID: Trudi Howard is a 53 y.o. female.    Vitals:  height is 5' 9" (1.753 m) and weight is 122.9 kg (271 lb). Her temperature is 98.8 °F (37.1 °C). Her blood pressure is 148/94 (abnormal) and her pulse is 84. Her respiration is 18 and oxygen saturation is 98%.     Chief Complaint: Sinus Problem    Pt did go to doctor this passed Friday and was proscribed antibiotic, cough medication, and allergy medication.  She states she was seen by a fellow and was concerned about being prescribed so many medicines.  She states she did not use the inhaler or Flonase.  She states her symptoms have slightly improved.  She was diagnosed with upper respiratory infection.      Sinus Problem   This is a new problem. The current episode started 1 to 4 weeks ago. The problem has been gradually worsening since onset. There has been no fever. Her pain is at a severity of 3/10. The pain is mild. Associated symptoms include congestion, coughing and shortness of breath. Pertinent negatives include no chills, diaphoresis, ear pain, headaches, sinus pressure or sore throat. Past treatments include oral decongestants and antibiotics.       Constitution: Negative for chills, sweating, fatigue and fever.   HENT: Positive for congestion. Negative for ear pain, sinus pain, sinus pressure, sore throat and voice change.    Neck: Negative for painful lymph nodes.   Eyes: Negative for eye redness.   Respiratory: Positive for cough, sputum production and shortness of breath. Negative for chest tightness, bloody sputum, COPD, stridor, wheezing and asthma.    Gastrointestinal: Negative for nausea and vomiting.   Musculoskeletal: Negative for muscle ache.   Skin: Negative for rash.   Allergic/Immunologic: Negative for environmental allergies, seasonal allergies, asthma, recurrent sinus infections and chronic cough.   Neurological: Negative for dizziness and headaches.   Hematologic/Lymphatic: Negative for swollen lymph nodes.     "   Objective:      Physical Exam   Constitutional: She is oriented to person, place, and time. She appears well-developed and well-nourished. She is cooperative.  Non-toxic appearance. She does not appear ill. No distress.   HENT:   Head: Normocephalic and atraumatic.   Right Ear: Hearing, tympanic membrane, external ear and ear canal normal.   Left Ear: Hearing, tympanic membrane, external ear and ear canal normal.   Nose: Mucosal edema present. No rhinorrhea or nasal deformity. No epistaxis. Right sinus exhibits no maxillary sinus tenderness and no frontal sinus tenderness. Left sinus exhibits no maxillary sinus tenderness and no frontal sinus tenderness.   Mouth/Throat: Uvula is midline, oropharynx is clear and moist and mucous membranes are normal. No trismus in the jaw. Normal dentition. No uvula swelling. No posterior oropharyngeal erythema.   Eyes: Conjunctivae and lids are normal. Right eye exhibits no discharge. Left eye exhibits no discharge. No scleral icterus.   Sclera clear bilat   Neck: Trachea normal, normal range of motion, full passive range of motion without pain and phonation normal. Neck supple.   Cardiovascular: Normal rate, regular rhythm, normal heart sounds, intact distal pulses and normal pulses.   Pulmonary/Chest: Effort normal and breath sounds normal. No respiratory distress.   Abdominal: Soft. Normal appearance and bowel sounds are normal. She exhibits no distension, no pulsatile midline mass and no mass. There is no tenderness.   Musculoskeletal: Normal range of motion. She exhibits no edema or deformity.   Neurological: She is alert and oriented to person, place, and time. She exhibits normal muscle tone. Coordination normal.   Skin: Skin is warm, dry and intact. She is not diaphoretic. No pallor.   Psychiatric: She has a normal mood and affect. Her speech is normal and behavior is normal. Judgment and thought content normal. Cognition and memory are normal.   Nursing note and vitals  reviewed.      Assessment:       1. Upper respiratory tract infection, unspecified type        Plan:         Upper respiratory tract infection, unspecified type  -     promethazine-codeine 6.25-10 mg/5 ml (PHENERGAN WITH CODEINE) 6.25-10 mg/5 mL syrup; Take 5 mLs by mouth every 4 (four) hours as needed for Cough.  Dispense: 118 mL; Refill: 0      Patient Instructions   Continue antibiotic, tessalon pearls, ceterizine, albuterol inhaler as directed.     Please return here or go to the Emergency Department for any concerns or worsening of condition.  If you were prescribed antibiotics, please take them to completion.  If you were prescribed a narcotic medication, do not drive or operate heavy equipment or machinery while taking these medications.  Please follow up with your primary care doctor or specialist as needed.    If you  smoke, please stop smoking.

## 2018-11-20 NOTE — PATIENT INSTRUCTIONS
Continue antibiotic, tessalon pearls, ceterizine, albuterol inhaler as directed.     Please return here or go to the Emergency Department for any concerns or worsening of condition.  If you were prescribed antibiotics, please take them to completion.  If you were prescribed a narcotic medication, do not drive or operate heavy equipment or machinery while taking these medications.  Please follow up with your primary care doctor or specialist as needed.    If you  smoke, please stop smoking.

## 2018-11-26 ENCOUNTER — CLINICAL SUPPORT (OUTPATIENT)
Dept: REHABILITATION | Facility: HOSPITAL | Age: 53
End: 2018-11-26
Attending: ORTHOPAEDIC SURGERY
Payer: MEDICAID

## 2018-11-26 DIAGNOSIS — R53.1 WEAKNESS: ICD-10-CM

## 2018-11-26 PROCEDURE — 97530 THERAPEUTIC ACTIVITIES: CPT

## 2018-11-26 NOTE — PROGRESS NOTES
"                            Occupational Therapy Progress Note     Name: Trudi Howard  Clinic Number: [id number]    Therapy Diagnosis:   Encounter Diagnosis   Name Primary?    Weakness      Physician: Kenneth Tang Jr., *    Physician Orders: Eval and Treat; PROM and AROM  Medical Diagnosis:   M25.531 (ICD-10-CM) - Right wrist pain   S62.101D (ICD-10-CM) - Closed fracture of right wrist with routine healing, subsequent encounter        Surgical Procedure and Date: N/A  Evaluation Date: 6/11/2018  Insurance Authorization Period Expiration: 12/31/2018, charge as TA  Plan of Care Certification Period:  8/11/2018 - 10/11/2018     Date of Return to MD: 11/12/2018    Imaging 7/2/2018: X-RAYS:  AP and lateral right hand healed distal radius fracture, good position, nondisplaced.    Visit date: 11/26/2018  Visit # / Visits authorized: 17/20  Time In:11:30 AM  Time Out: 12:30 PM  Total Billable Time: 60 minutes  Charges: 4 TA    Precautions: Standard    Subjective     Pt reports that she has been sick and unable to perform her HEP.  Response to previous treatment: "I lost range because I haven't been doing my home exercises due to me being sick."  Functional change: None noted    Pain: 3-4/10  Location: right wrist      Objective        Edema. Measured in centimeters.    6/11/2018 6/11/2018 6/29/2018 7/27/2018 8/23/2018 9/20/2018 10/5/2018 11/26/2018     Left Right Right Right Right Right Right Right   Wrist Crease 18 19.7 19.5 19.5 19.5 19.3 19.3 19.0   Midpalm 20.9 22 22 22 22 22 21.5 21.0   MCPs 20 21.4 21.4 21.4 20.5 20.5 20.5 20.0      Edema. Measured in centimeters.    6/11/2018 6/11/2018 6/29/2018 7/27/2018 8/23/2018 9/20/2018 10/5/2018 11/26/2018     Left Right Right Right Right Right Right Right   Index:               P1 6.7 7.4 7.4 7.4 7.5 7.5 7.5 7.3   Long:               P1 6.6 7.2 7.2 7.2 7.0 7.0 7.0 6.5   Ring:               P1            6.7 7 7 7 7.0 7.0 7.0 7.0   Small:                P1           6.1 " 6.3 6.3 6.3 6.5 6.5 6.5 6.5   Thumb:             Prox. Phalanx 7.2 7.2 7.2 7.2 7.2 7.2 7.2 7.1      Elbow and Wrist ROM. Measured in degrees.    6/11/2018 6/11/2018 6/29/2018 7/27/2018 8/23/2018 9/20/2018 10/5/2018 11/26/2018     Left Right Right Right Right Right Right Right   Elbow Ext/Flex WNL WNL WNL WNL WNL WNL WNL WNL   Supination/Pronation 50/90 50/90 60/90 85/90 85/90 85/90 WNL WNL   Wrist Ext/Flex 70/65 36/46 50/40 50/55 50/60 55/65 55/65 50/60   Wrist RD/UD 25/60 10/26 10/25 15/30 15/35 20/35 20/35 20/35      Hand ROM. Measured in degrees.    6/11/2018 6/11/2018 6/29/2018 7/27/2018 8/23/2018 9/20/2018 10/5/2018 11/26/2018     Left Right Right Right Right Right Right Right                 Index: MP  77 50 50 60 60 60 60 47              PIP     89 79 80 86 90 93 93 87              DIP 70 40 45 50 60 60 60 57              DURAN 236 169 175 196 210 213 213 191                 Long:  MP 80 44 47 55 55 55 55 41              PIP 86 84 80 82 90 90 90 28/90              DIP 80 48 50 55 60 60 60 49              DURAN 246 176 177 192 205 205 205 152                 Ring:   MP 79 40 40 45 50 50 50 32              PIP 90 86 80 80 89 90 90 17/86              DIP 76 64 64 65 70 70 70 57              DURAN 245 190 184 190 209 210 210 158                 Small:  MP 77 47 35 42 42 44 44 35               PIP 88 78 78 78 78 85 85 8/75               DIP 85 70 70 70 70 70 70 62              DURAN 250 195 183 190 190 199 199 164                 Thumb: MP 45 26 45 50 50 50 50 42                IP 66 44 45 55 60 65 65 62       Rad ADD/ABD 65 65 65 65 65 65 65 65       Pal ADD/ABD 55 60 60 60 60 60 60 60          Opposition To distal palmar crease To mid distal phalanx of SF 3cm 2 cm 1 cm Base of 5th Base of 5th .5 cm from base of 5th       Strength (Dyanmometer) and Pinch Strength (Pinch Gauge)  Measured in pounds.    6/11/2018 6/11/2018 7/13/2018 7/13/2018 8/23/2018 9/20/2018 10/5/2018     Left Right Left Right Right Right Right    Rung II defer defer 40 6 15 15 20   Mendieta Pinch     12 5 8 8 8   3pt Pinch     13 3 5 5 5   2 pt Pinch   10 5 3 3 4    Right          11/26/2018         Rung II 23         Mendieta Pinch 9         3 pt Pinch 8         2 pt Pinch 5                 Trudi received therapeutic activities for 60  minutes including: fluidotherapy, RM and finger, thumb, wrist and forearm AROM as listed in the media section x 10 reps each  -Hand gripper with 1 red, 1 green and 1 yellow bands x 30 reps  -Wrist 3 ways with 3 # 2  x 15  reps  -2 plates on rotation bar x 15 reps   -Green digi-extend x 30 reps  -Green digiflex x 30 reps  -Red putty - 2' molding, 15 grasps, 5 flowers, 3 logs each (key and pinch  -Red powerweb x 30 reps      Home Exercises and Education Provided     Education provided:   -Written home exercise program as listed in the patient instruction's section.      Assessment     Pt tolerated all exercises with mild c/o pain. Pt's range of motion in her hand has decreased due to her non-compliance with her HEP. Pt reports that she has been sick and was unable to perform the exercises. Edema has decreased and /pinch strength have slightly improved.     Trudi is progressing well towards her goals and there are no updates to goals at this time. Pt prognosis is Fair.    Pt will continue to benefit from skilled outpatient occupational therapy to address the deficits listed in the problem list on initial evaluation provide pt/family education and to maximize pt's level of independence in the home and community environment.     Anticipated barriers to occupational therapy: Attention span    Pt's spiritual, cultural and educational needs considered and pt agreeable to plan of care and goals.    Goals:  Long Term Goals (LTGs); to be met by discharge.  LTG #1: Pt will report a pain level of 1 out of 10 with activity.-  progressing         LTG #2: Pt will demo improved FOTO score by reaching CK G-Code. - progressing  LTG #3: Pt will return  to prior level of function for ADLs and household management.-  progressing     Short Term Goals (STGs); to be met within 4 weeks (7/11/2018).  STG #1a: Pt will report 2 out of 10 pain level at rest. - not met, progressing  STG #2a: Pt will report/demo [place] with cooking. Not met, progressing  STG #2b: Pt will report/demo [place] with bathing and grooming. Partially met with bathing, met with grooming - progressing  STG #3a: Pt will demonstrate independence with issued HEP. - met 9/18/2018  STG #3b: Pt will demo improved wrist flexion and extension by 15 degrees each, needed to aid with dressing. Met 9/18/2018    Plan: Cont with OT.     Pt to be treated by Occupational Therapy 2 times per week for 8 weeks during the certification period from 10/11/2018 to 12/11/2018 to achieve the established goals.      Discussed Plan of Care with patient: Yes  Updates/Grading for next session: Advance as tolerated      MARTHA Del Rio, CHT

## 2018-11-28 ENCOUNTER — CLINICAL SUPPORT (OUTPATIENT)
Dept: REHABILITATION | Facility: HOSPITAL | Age: 53
End: 2018-11-28
Attending: ORTHOPAEDIC SURGERY
Payer: MEDICAID

## 2018-11-28 DIAGNOSIS — R53.1 WEAKNESS: ICD-10-CM

## 2018-11-28 PROCEDURE — 97530 THERAPEUTIC ACTIVITIES: CPT

## 2018-11-28 NOTE — PROGRESS NOTES
"                            Occupational Therapy Progress Note     Name: Trudi Howard  Clinic Number: [id number]    Therapy Diagnosis:   Encounter Diagnosis   Name Primary?    Weakness      Physician: Kenneth Tang Jr., *    Physician Orders: Eval and Treat; PROM and AROM  Medical Diagnosis:   M25.531 (ICD-10-CM) - Right wrist pain   S62.101D (ICD-10-CM) - Closed fracture of right wrist with routine healing, subsequent encounter        Surgical Procedure and Date: N/A  Evaluation Date: 6/11/2018  Insurance Authorization Period Expiration: 12/31/2018, charge as TA  Plan of Care Certification Period:  8/11/2018 - 10/11/2018     Date of Return to MD: 11/12/2018    Imaging 7/2/2018: X-RAYS:  AP and lateral right hand healed distal radius fracture, good position, nondisplaced.    Visit date: 11/28/2018  Visit # / Visits authorized: 18/20  Time In:11:00  AM  Time Out: 12:00 PM  Total Billable Time: 60 minutes  Charges: 4 TA    Precautions: Standard    Subjective     Pt reports that she has been sick and unable to perform her HEP.  Response to previous treatment: "My ring finger is doing a little better."  Functional change: None noted    Pain: 3-4/10  Location: right wrist      Objective        Edema. Measured in centimeters.    6/11/2018 6/11/2018 6/29/2018 7/27/2018 8/23/2018 9/20/2018 10/5/2018 11/26/2018     Left Right Right Right Right Right Right Right   Wrist Crease 18 19.7 19.5 19.5 19.5 19.3 19.3 19.0   Midpalm 20.9 22 22 22 22 22 21.5 21.0   MCPs 20 21.4 21.4 21.4 20.5 20.5 20.5 20.0      Edema. Measured in centimeters.    6/11/2018 6/11/2018 6/29/2018 7/27/2018 8/23/2018 9/20/2018 10/5/2018 11/26/2018     Left Right Right Right Right Right Right Right   Index:               P1 6.7 7.4 7.4 7.4 7.5 7.5 7.5 7.3   Long:               P1 6.6 7.2 7.2 7.2 7.0 7.0 7.0 6.5   Ring:               P1            6.7 7 7 7 7.0 7.0 7.0 7.0   Small:                P1           6.1 6.3 6.3 6.3 6.5 6.5 6.5 6.5   Thumb:       "       Prox. Phalanx 7.2 7.2 7.2 7.2 7.2 7.2 7.2 7.1      Elbow and Wrist ROM. Measured in degrees.    6/11/2018 6/11/2018 6/29/2018 7/27/2018 8/23/2018 9/20/2018 10/5/2018 11/26/2018     Left Right Right Right Right Right Right Right   Elbow Ext/Flex WNL WNL WNL WNL WNL WNL WNL WNL   Supination/Pronation 50/90 50/90 60/90 85/90 85/90 85/90 WNL WNL   Wrist Ext/Flex 70/65 36/46 50/40 50/55 50/60 55/65 55/65 50/60   Wrist RD/UD 25/60 10/26 10/25 15/30 15/35 20/35 20/35 20/35      Hand ROM. Measured in degrees.    6/11/2018 6/11/2018 6/29/2018 7/27/2018 8/23/2018 9/20/2018 10/5/2018 11/26/2018     Left Right Right Right Right Right Right Right                 Index: MP  77 50 50 60 60 60 60 47              PIP     89 79 80 86 90 93 93 87              DIP 70 40 45 50 60 60 60 57              DURAN 236 169 175 196 210 213 213 191                 Long:  MP 80 44 47 55 55 55 55 41              PIP 86 84 80 82 90 90 90 28/90              DIP 80 48 50 55 60 60 60 49              DURAN 246 176 177 192 205 205 205 152                 Ring:   MP 79 40 40 45 50 50 50 32              PIP 90 86 80 80 89 90 90 17/86              DIP 76 64 64 65 70 70 70 57              DURAN 245 190 184 190 209 210 210 158                 Small:  MP 77 47 35 42 42 44 44 35               PIP 88 78 78 78 78 85 85 8/75               DIP 85 70 70 70 70 70 70 62              DURAN 250 195 183 190 190 199 199 164                 Thumb: MP 45 26 45 50 50 50 50 42                IP 66 44 45 55 60 65 65 62       Rad ADD/ABD 65 65 65 65 65 65 65 65       Pal ADD/ABD 55 60 60 60 60 60 60 60          Opposition To distal palmar crease To mid distal phalanx of SF 3cm 2 cm 1 cm Base of 5th Base of 5th .5 cm from base of 5th       Strength (Dyanmometer) and Pinch Strength (Pinch Gauge)  Measured in pounds.    6/11/2018 6/11/2018 7/13/2018 7/13/2018 8/23/2018 9/20/2018 10/5/2018     Left Right Left Right Right Right Right   Rung II defer defer 40 6 15 15 20   Mendieta Pinch      12 5 8 8 8   3pt Pinch     13 3 5 5 5   2 pt Pinch   10 5 3 3 4    Right          11/26/2018         Rung II 23         Mendieta Pinch 9         3 pt Pinch 8         2 pt Pinch 5                 Trudi received therapeutic activities for 60  minutes including: fluidotherapy, RM and finger, thumb, wrist and forearm AROM as listed in the media section x 10 reps each  -Hand gripper with 1 red, 1 green and 1 yellow bands x 30 reps  -Wrist 3 ways with 3 # 2  x 15  reps  -2 plates on rotation bar x 15 reps   -Green digi-extend x 30 reps  -Green digiflex x 30 reps  -Red putty - 2' molding, 15 grasps, 5 flowers, 3 logs each (key and pinch)  -Red powerweb x 30 reps      Home Exercises and Education Provided     Education provided:   -Written home exercise program as listed in the patient instruction's section.      Assessment     Advanced strengthening exercises with no complaints of pain. Pt continues to be non-compliant with her HEP.    Trudi is progressing fair towards her goals and there are no updates to goals at this time. Pt prognosis is Fair.    Pt will continue to benefit from skilled outpatient occupational therapy to address the deficits listed in the problem list on initial evaluation provide pt/family education and to maximize pt's level of independence in the home and community environment.     Anticipated barriers to occupational therapy: Attention span    Pt's spiritual, cultural and educational needs considered and pt agreeable to plan of care and goals.    Goals:  Long Term Goals (LTGs); to be met by discharge.  LTG #1: Pt will report a pain level of 1 out of 10 with activity.-  progressing         LTG #2: Pt will demo improved FOTO score by reaching CK G-Code. - progressing  LTG #3: Pt will return to prior level of function for ADLs and household management.-  progressing     Short Term Goals (STGs); to be met within 4 weeks (7/11/2018).  STG #1a: Pt will report 2 out of 10 pain level at rest. - not met,  progressing  STG #2a: Pt will report/demo [place] with cooking. Not met, progressing  STG #2b: Pt will report/demo [place] with bathing and grooming. Partially met with bathing, met with grooming - progressing  STG #3a: Pt will demonstrate independence with issued HEP. - met 9/18/2018  STG #3b: Pt will demo improved wrist flexion and extension by 15 degrees each, needed to aid with dressing. Met 9/18/2018    Plan: Cont with OT.     Pt to be treated by Occupational Therapy 2 times per week for 8 weeks during the certification period from 10/11/2018 to 12/11/2018 to achieve the established goals.      Discussed Plan of Care with patient: Yes  Updates/Grading for next session: Advance as tolerated      MARTHA Del Rio, JET

## 2018-12-05 ENCOUNTER — CLINICAL SUPPORT (OUTPATIENT)
Dept: REHABILITATION | Facility: HOSPITAL | Age: 53
End: 2018-12-05
Attending: ORTHOPAEDIC SURGERY
Payer: MEDICAID

## 2018-12-05 DIAGNOSIS — R53.1 WEAKNESS: ICD-10-CM

## 2018-12-05 PROCEDURE — 97530 THERAPEUTIC ACTIVITIES: CPT

## 2018-12-05 NOTE — PROGRESS NOTES
Occupational Therapy Progress Note     Name: Trudi Howard  Clinic Number: [id number]    Therapy Diagnosis:   Encounter Diagnosis   Name Primary?    Weakness      Physician: Kenneth Tang Jr., *    Physician Orders: Eval and Treat; PROM and AROM  Medical Diagnosis:   M25.531 (ICD-10-CM) - Right wrist pain   S62.101D (ICD-10-CM) - Closed fracture of right wrist with routine healing, subsequent encounter        Surgical Procedure and Date: N/A  Evaluation Date: 6/11/2018  Insurance Authorization Period Expiration: 12/31/2018, charge as TA  Plan of Care Certification Period:  8/11/2018 - 10/11/2018     Date of Return to MD: 11/12/2018    Imaging 7/2/2018: X-RAYS:  AP and lateral right hand healed distal radius fracture, good position, nondisplaced.    Visit date: 11/28/2018  Visit # / Visits authorized: 19/20  Time In:11:05  AM  Time Out: 12:05 PM  Total Billable Time: 60 minutes  Charges: 4 TA    Precautions: Standard    Subjective     Pt reports that she does her HEP sometimes.  Response to previous treatment:  Pt states that she is getting stronger.  Functional change: Pt reports that she is now lifting pots.    Pain: 3-4/10  Location: right wrist      Objective        Edema. Measured in centimeters.    6/11/2018 6/11/2018 6/29/2018 7/27/2018 8/23/2018 9/20/2018 10/5/2018 11/26/2018     Left Right Right Right Right Right Right Right   Wrist Crease 18 19.7 19.5 19.5 19.5 19.3 19.3 19.0   Midpalm 20.9 22 22 22 22 22 21.5 21.0   MCPs 20 21.4 21.4 21.4 20.5 20.5 20.5 20.0      Edema. Measured in centimeters.    6/11/2018 6/11/2018 6/29/2018 7/27/2018 8/23/2018 9/20/2018 10/5/2018 11/26/2018     Left Right Right Right Right Right Right Right   Index:               P1 6.7 7.4 7.4 7.4 7.5 7.5 7.5 7.3   Long:               P1 6.6 7.2 7.2 7.2 7.0 7.0 7.0 6.5   Ring:               P1            6.7 7 7 7 7.0 7.0 7.0 7.0   Small:                P1           6.1 6.3 6.3 6.3 6.5 6.5 6.5 6.5    Thumb:             Prox. Phalanx 7.2 7.2 7.2 7.2 7.2 7.2 7.2 7.1      Elbow and Wrist ROM. Measured in degrees.    6/11/2018 6/11/2018 6/29/2018 7/27/2018 8/23/2018 9/20/2018 10/5/2018 11/26/2018     Left Right Right Right Right Right Right Right   Elbow Ext/Flex WNL WNL WNL WNL WNL WNL WNL WNL   Supination/Pronation 50/90 50/90 60/90 85/90 85/90 85/90 WNL WNL   Wrist Ext/Flex 70/65 36/46 50/40 50/55 50/60 55/65 55/65 50/60   Wrist RD/UD 25/60 10/26 10/25 15/30 15/35 20/35 20/35 20/35      Hand ROM. Measured in degrees.    6/11/2018 6/11/2018 6/29/2018 7/27/2018 8/23/2018 9/20/2018 10/5/2018 11/26/2018     Left Right Right Right Right Right Right Right                 Index: MP  77 50 50 60 60 60 60 47              PIP     89 79 80 86 90 93 93 87              DIP 70 40 45 50 60 60 60 57              DURAN 236 169 175 196 210 213 213 191                 Long:  MP 80 44 47 55 55 55 55 41              PIP 86 84 80 82 90 90 90 28/90              DIP 80 48 50 55 60 60 60 49              DURAN 246 176 177 192 205 205 205 152                 Ring:   MP 79 40 40 45 50 50 50 32              PIP 90 86 80 80 89 90 90 17/86              DIP 76 64 64 65 70 70 70 57              DURAN 245 190 184 190 209 210 210 158                 Small:  MP 77 47 35 42 42 44 44 35               PIP 88 78 78 78 78 85 85 8/75               DIP 85 70 70 70 70 70 70 62              DURAN 250 195 183 190 190 199 199 164                 Thumb: MP 45 26 45 50 50 50 50 42                IP 66 44 45 55 60 65 65 62       Rad ADD/ABD 65 65 65 65 65 65 65 65       Pal ADD/ABD 55 60 60 60 60 60 60 60          Opposition To distal palmar crease To mid distal phalanx of SF 3cm 2 cm 1 cm Base of 5th Base of 5th .5 cm from base of 5th       Strength (Dyanmometer) and Pinch Strength (Pinch Gauge)  Measured in pounds.    6/11/2018 6/11/2018 7/13/2018 7/13/2018 8/23/2018 9/20/2018 10/5/2018     Left Right Left Right Right Right Right   Rung II defer defer 40 6 15 15  20   Mendieta Pinch     12 5 8 8 8   3pt Pinch     13 3 5 5 5   2 pt Pinch   10 5 3 3 4    Right          11/26/2018         Rung II 23         Mendieta Pinch 9         3 pt Pinch 8         2 pt Pinch 5                 Trudi received therapeutic activities for 60  minutes including: fluidotherapy, RM and finger, thumb, wrist and forearm AROM as listed in the media section x 10 reps each  -Hand gripper with 1 red, 1 green and 1 yellow bands x 30 reps  -Wrist 3 ways with 3 # 2  x 15  reps  -2 plates on rotation bar 2 x 15 reps   -Green digi-extend x 30 reps  -Green digiflex x 30 reps  -Red putty - 2' molding, 15 grasps, 5 flowers, 3 logs each (key and pinch)  -Green powerweb x 30 reps      Home Exercises and Education Provided     Education provided:   -Written home exercise program as listed in the patient instruction's section.      Assessment     Advanced strengthening exercises with no complaints of pain. Pt continues to be non-compliant with her HEP.    Trudi is progressing fair towards her goals and there are no updates to goals at this time. Pt prognosis is Fair.    Pt will continue to benefit from skilled outpatient occupational therapy to address the deficits listed in the problem list on initial evaluation provide pt/family education and to maximize pt's level of independence in the home and community environment.     Anticipated barriers to occupational therapy: Attention span    Pt's spiritual, cultural and educational needs considered and pt agreeable to plan of care and goals.    Goals:  Long Term Goals (LTGs); to be met by discharge.  LTG #1: Pt will report a pain level of 1 out of 10 with activity.-  progressing         LTG #2: Pt will demo improved FOTO score by reaching CK G-Code. - progressing  LTG #3: Pt will return to prior level of function for ADLs and household management.-  progressing     Short Term Goals (STGs); to be met within 4 weeks (7/11/2018).  STG #1a: Pt will report 2 out of 10 pain level at  rest. - not met, progressing  STG #2a: Pt will report/demo [place] with cooking. Not met, progressing  STG #2b: Pt will report/demo [place] with bathing and grooming. Partially met with bathing, met with grooming - progressing  STG #3a: Pt will demonstrate independence with issued HEP. - met 9/18/2018  STG #3b: Pt will demo improved wrist flexion and extension by 15 degrees each, needed to aid with dressing. Met 9/18/2018    Plan: Re-assess next session.   Pt to be treated by Occupational Therapy 2 times per week for 8 weeks during the certification period from 10/11/2018 to 12/11/2018 to achieve the established goals.      Discussed Plan of Care with patient: Yes  Updates/Grading for next session: Advance as tolerated      MARTHA Del Rio, JET

## 2018-12-19 NOTE — PROGRESS NOTES
I have reviewed the notes, assessments, and/or procedures performed, I concur with her/his documentation of Trudi Howard.

## 2018-12-27 ENCOUNTER — CLINICAL SUPPORT (OUTPATIENT)
Dept: REHABILITATION | Facility: HOSPITAL | Age: 53
End: 2018-12-27
Attending: ORTHOPAEDIC SURGERY
Payer: MEDICAID

## 2018-12-27 DIAGNOSIS — R53.1 WEAKNESS: ICD-10-CM

## 2018-12-27 PROCEDURE — 97530 THERAPEUTIC ACTIVITIES: CPT

## 2018-12-27 NOTE — PROGRESS NOTES
Occupational Therapy Re-evaluation Note     Name: Trudi Howard  Clinic Number: [id number]    Therapy Diagnosis:   Encounter Diagnosis   Name Primary?    Weakness      Physician: Kenneth Tang Jr., *    Physician Orders: Eval and Treat; PROM and AROM  Medical Diagnosis:   M25.531 (ICD-10-CM) - Right wrist pain   S62.101D (ICD-10-CM) - Closed fracture of right wrist with routine healing, subsequent encounter        Surgical Procedure and Date: N/A  Evaluation Date: 6/11/2018  Insurance Authorization Period Expiration: 12/12/2019, charge as TA  Plan of Care Certification Period:  12/11/2018 to 2/11/2019     Date of Return to MD: 1/24/2018    Imaging 7/2/2018: X-RAYS:  AP and lateral right hand healed distal radius fracture, good position, nondisplaced.    Visit date: 12/27/2018  Visit # / Visits authorized:1/ 30 (new referral)  Time In:11:05  AM  Time Out: 12:05 PM  Total Billable Time: 60 minutes  Charges: 4 TA    Precautions: Standard    Subjective     Pt reports that she was not compliant with her HEP.  Response to previous treatment:  Pt states that she is moving her hand more.  Functional change: Pt reports that she is now lifting pots.    Pain: 3-4/10  Location: right wrist      Objective        Edema. Measured in centimeters.    6/11/2018 6/11/2018 6/29/2018 7/27/2018 8/23/2018 9/20/2018 10/5/2018 11/26/2018 12/27/2018     Left Right Right Right Right Right Right Right Right   Wrist Crease 18 19.7 19.5 19.5 19.5 19.3 19.3 19.0 19.0   Midpalm 20.9 22 22 22 22 22 21.5 21.0 21.0   MCPs 20 21.4 21.4 21.4 20.5 20.5 20.5 20.0 20.0      Edema. Measured in centimeters.    6/11/2018 6/11/2018 6/29/2018 7/27/2018 8/23/2018 9/20/2018 10/5/2018 11/26/2018 12/27/2018     Left Right Right Right Right Right Right Right Right   Index:                P1 6.7 7.4 7.4 7.4 7.5 7.5 7.5 7.3 7.3   Long:                P1 6.6 7.2 7.2 7.2 7.0 7.0 7.0 6.5 6.5   Ring:                P1            6.7 7 7 7  7.0 7.0 7.0 7.0 7.0   Small:                 P1           6.1 6.3 6.3 6.3 6.5 6.5 6.5 6.5 6.5   Thumb:              Prox. Phalanx 7.2 7.2 7.2 7.2 7.2 7.2 7.2 7.1 7.1      Elbow and Wrist ROM. Measured in degrees.    6/11/2018 6/11/2018 6/29/2018 7/27/2018 8/23/2018 9/20/2018 10/5/2018 11/26/2018 12/27/2018     Left Right Right Right Right Right Right Right Right   Elbow Ext/Flex WNL WNL WNL WNL WNL WNL WNL WNL WNL   Supination/Pronation 50/90 50/90 60/90 85/90 85/90 85/90 WNL WNL WNL   Wrist Ext/Flex 70/65 36/46 50/40 50/55 50/60 55/65 55/65 50/60 55/60   Wrist RD/UD 25/60 10/26 10/25 15/30 15/35 20/35 20/35 20/35 20/35      Hand ROM. Measured in degrees.    6/11/2018 6/11/2018 6/29/2018 7/27/2018 8/23/2018 9/20/2018 10/5/2018 11/26/2018 12/27/2018     Left Right Right Right Right Right Right Right Right                  Index: MP  77 50 50 60 60 60 60 47 57              PIP     89 79 80 86 90 93 93 87 82              DIP 70 40 45 50 60 60 60 57 60              DURAN 236 169 175 196 210 213 213 191 199                  Long:  MP 80 44 47 55 55 55 55 41 50              PIP 86 84 80 82 90 90 90 28/90 28/90              DIP 80 48 50 55 60 60 60 49 53              DURAN 246 176 177 192 205 205 205 152 165                  Ring:   MP 79 40 40 45 50 50 50 32 42              PIP 90 86 80 80 89 90 90 17/86 17/86              DIP 76 64 64 65 70 70 70 57 65              DURAN 245 190 184 190 209 210 210 158 176                  Small:  MP 77 47 35 42 42 44 44 35 35               PIP 88 78 78 78 78 85 85 8/75 8/80               DIP 85 70 70 70 70 70 70 62 80              DURAN 250 195 183 190 190 199 199 164 187                  Thumb: MP 45 26 45 50 50 50 50 42 43                IP 66 44 45 55 60 65 65 62 62       Rad ADD/ABD 65 65 65 65 65 65 65 65 65       Pal ADD/ABD 55 60 60 60 60 60 60 60 60          Opposition To distal palmar crease To mid distal phalanx of SF 3cm 2 cm 1 cm Base of 5th Base of 5th .5 cm from base of 5th .5 cm  base of 5th       Strength (Dyanmometer) and Pinch Strength (Pinch Gauge)  Measured in pounds.    6/11/2018 6/11/2018 7/13/2018 7/13/2018 8/23/2018 9/20/2018 10/5/2018     Left Right Left Right Right Right Right   Rung II defer defer 40 6 15 15 20   Mendieta Pinch     12 5 8 8 8   3pt Pinch     13 3 5 5 5   2 pt Pinch   10 5 3 3 4    Right Right         11/26/2018 12/27/2018        Rung II 23 23        Mendieta Pinch 9 10        3 pt Pinch 8 9        2 pt Pinch 5 9              CMS Impairment/Limitation/Restriction for FOTO Wrist Survey    Therapist reviewed FOTO scores for Trudi Howard on 12/27/2018.   FOTO documents entered into [facility] - see Media section.    Limitation Score: 65%  Category: Carrying    Current : CL = least 60% but < 80% impaired, limited or restricted  Goal: CK = at least 40% but < 60% impaired, limited or restricted               Trudi received therapeutic activities for 60  minutes including: fluidotherapy, RM and finger, thumb, wrist and forearm AROM as listed in the media section x 10 reps each  -Hand gripper with 1 red, 1 green and 1 yellow bands x 30 reps  -Wrist 3 ways with 3 # 2  x 15  reps  -2 plates on rotation bar 2 x 15 reps   -Green digi-extend x 30 reps  -Green digiflex x 30 reps  -Green putty - 2' molding, 15 grasps, 3 logs each (key and pinch)  - Red putty - 5 flowers  -Green powerweb x 30 reps      Home Exercises and Education Provided     Education provided:   -Green putty  -Written home exercise program as listed in the patient instruction's section.      Assessment     Advanced strengthening exercises with no complaints of pain. Pt continues to be non-compliant with her HEP.    Trudi is progressing fair towards her goals and there are no updates to goals at this time. Pt prognosis is Fair.    Pt will continue to benefit from skilled outpatient occupational therapy to address the deficits listed in the problem list on initial evaluation provide pt/family education and to maximize pt's  level of independence in the home and community environment.     Anticipated barriers to occupational therapy: Attention span    Pt's spiritual, cultural and educational needs considered and pt agreeable to plan of care and goals.    Goals:  Long Term Goals (LTGs); to be met by discharge.  LTG #1: Pt will report a pain level of 1 out of 10 with activity.-  progressing         LTG #2: Pt will demo improved FOTO score by reaching CK G-Code. - progressing  LTG #3: Pt will return to prior level of function for ADLs and household management.-  progressing     Short Term Goals (STGs); to be met within 4 weeks (7/11/2018).  STG #1a: Pt will report 2 out of 10 pain level at rest. - not met, progressing  STG #2a: Pt will report/demo [place] with cooking. Not met, progressing  STG #2b: Pt will report/demo [place] with bathing and grooming. Partially met with bathing, met with grooming - progressing  STG #3a: Pt will demonstrate independence with issued HEP. - met 9/18/2018  STG #3b: Pt will demo improved wrist flexion and extension by 15 degrees each, needed to aid with dressing. Met 9/18/2018    Plan:    Pt to be treated by Occupational Therapy 2 times per week for 8 weeks during the certification period from 12/11/2018 to 2/11/2019 to achieve the established goals.      Discussed Plan of Care with patient: Yes  Updates/Grading for next session: Advance as tolerated      MARTHA Del Rio, JET

## 2018-12-27 NOTE — PLAN OF CARE
Occupational Therapy Re-evaluation Note     Name: Trudi Howard  Clinic Number: [id number]    Therapy Diagnosis:   Encounter Diagnosis   Name Primary?    Weakness      Physician: Kenneth Tang Jr., *    Physician Orders: Eval and Treat; PROM and AROM  Medical Diagnosis:   M25.531 (ICD-10-CM) - Right wrist pain   S62.101D (ICD-10-CM) - Closed fracture of right wrist with routine healing, subsequent encounter        Surgical Procedure and Date: N/A  Evaluation Date: 6/11/2018  Insurance Authorization Period Expiration: 12/12/2019, charge as TA  Plan of Care Certification Period:  12/11/2018 to 2/11/2019     Date of Return to MD: 1/24/2018    Imaging 7/2/2018: X-RAYS:  AP and lateral right hand healed distal radius fracture, good position, nondisplaced.    Visit date: 12/27/2018  Visit # / Visits authorized:1/ 30 (new referral)  Time In:11:05  AM  Time Out: 12:05 PM  Total Billable Time: 60 minutes  Charges: 4 TA    Precautions: Standard    Subjective     Pt reports that she was not compliant with her HEP.  Response to previous treatment:  Pt states that she is moving her hand more.  Functional change: Pt reports that she is now lifting pots.    Pain: 3-4/10  Location: right wrist      Objective        Edema. Measured in centimeters.    6/11/2018 6/11/2018 6/29/2018 7/27/2018 8/23/2018 9/20/2018 10/5/2018 11/26/2018 12/27/2018     Left Right Right Right Right Right Right Right Right   Wrist Crease 18 19.7 19.5 19.5 19.5 19.3 19.3 19.0 19.0   Midpalm 20.9 22 22 22 22 22 21.5 21.0 21.0   MCPs 20 21.4 21.4 21.4 20.5 20.5 20.5 20.0 20.0      Edema. Measured in centimeters.    6/11/2018 6/11/2018 6/29/2018 7/27/2018 8/23/2018 9/20/2018 10/5/2018 11/26/2018 12/27/2018     Left Right Right Right Right Right Right Right Right   Index:                P1 6.7 7.4 7.4 7.4 7.5 7.5 7.5 7.3 7.3   Long:                P1 6.6 7.2 7.2 7.2 7.0 7.0 7.0 6.5 6.5   Ring:                P1            6.7 7 7 7 7.0  7.0 7.0 7.0 7.0   Small:                 P1           6.1 6.3 6.3 6.3 6.5 6.5 6.5 6.5 6.5   Thumb:              Prox. Phalanx 7.2 7.2 7.2 7.2 7.2 7.2 7.2 7.1 7.1      Elbow and Wrist ROM. Measured in degrees.    6/11/2018 6/11/2018 6/29/2018 7/27/2018 8/23/2018 9/20/2018 10/5/2018 11/26/2018 12/27/2018     Left Right Right Right Right Right Right Right Right   Elbow Ext/Flex WNL WNL WNL WNL WNL WNL WNL WNL WNL   Supination/Pronation 50/90 50/90 60/90 85/90 85/90 85/90 WNL WNL WNL   Wrist Ext/Flex 70/65 36/46 50/40 50/55 50/60 55/65 55/65 50/60 55/60   Wrist RD/UD 25/60 10/26 10/25 15/30 15/35 20/35 20/35 20/35 20/35      Hand ROM. Measured in degrees.    6/11/2018 6/11/2018 6/29/2018 7/27/2018 8/23/2018 9/20/2018 10/5/2018 11/26/2018 12/27/2018     Left Right Right Right Right Right Right Right Right                  Index: MP  77 50 50 60 60 60 60 47 57              PIP     89 79 80 86 90 93 93 87 82              DIP 70 40 45 50 60 60 60 57 60              DURAN 236 169 175 196 210 213 213 191 199                  Long:  MP 80 44 47 55 55 55 55 41 50              PIP 86 84 80 82 90 90 90 28/90 28/90              DIP 80 48 50 55 60 60 60 49 53              DURAN 246 176 177 192 205 205 205 152 165                  Ring:   MP 79 40 40 45 50 50 50 32 42              PIP 90 86 80 80 89 90 90 17/86 17/86              DIP 76 64 64 65 70 70 70 57 65              DURAN 245 190 184 190 209 210 210 158 176                  Small:  MP 77 47 35 42 42 44 44 35 35               PIP 88 78 78 78 78 85 85 8/75 8/80               DIP 85 70 70 70 70 70 70 62 80              DURAN 250 195 183 190 190 199 199 164 187                  Thumb: MP 45 26 45 50 50 50 50 42 43                IP 66 44 45 55 60 65 65 62 62       Rad ADD/ABD 65 65 65 65 65 65 65 65 65       Pal ADD/ABD 55 60 60 60 60 60 60 60 60          Opposition To distal palmar crease To mid distal phalanx of SF 3cm 2 cm 1 cm Base of 5th Base of 5th .5 cm from base of 5th .5 cm base  of 5th       Strength (Dyanmometer) and Pinch Strength (Pinch Gauge)  Measured in pounds.    6/11/2018 6/11/2018 7/13/2018 7/13/2018 8/23/2018 9/20/2018 10/5/2018     Left Right Left Right Right Right Right   Rung II defer defer 40 6 15 15 20   Mendieta Pinch     12 5 8 8 8   3pt Pinch     13 3 5 5 5   2 pt Pinch   10 5 3 3 4    Right Right         11/26/2018 12/27/2018        Rung II 23 23        Mendieta Pinch 9 10        3 pt Pinch 8 9        2 pt Pinch 5 9              CMS Impairment/Limitation/Restriction for FOTO Wrist Survey    Therapist reviewed FOTO scores for Trudi Howard on 12/27/2018.   FOTO documents entered into [employer] - see Media section.    Limitation Score: 65%  Category: Carrying    Current : CL = least 60% but < 80% impaired, limited or restricted  Goal: CK = at least 40% but < 60% impaired, limited or restricted               Trudi received therapeutic activities for 60  minutes including: fluidotherapy, RM and finger, thumb, wrist and forearm AROM as listed in the media section x 10 reps each  -Hand gripper with 1 red, 1 green and 1 yellow bands x 30 reps  -Wrist 3 ways with 3 # 2  x 15  reps  -2 plates on rotation bar 2 x 15 reps   -Green digi-extend x 30 reps  -Green digiflex x 30 reps  -Green putty - 2' molding, 15 grasps, 3 logs each (key and pinch)  - Red putty - 5 flowers  -Green powerweb x 30 reps      Home Exercises and Education Provided     Education provided:   -Green putty  -Written home exercise program as listed in the patient instruction's section.      Assessment     Advanced strengthening exercises with no complaints of pain. Pt continues to be non-compliant with her HEP.    Trudi is progressing fair towards her goals and there are no updates to goals at this time. Pt prognosis is Fair.    Pt will continue to benefit from skilled outpatient occupational therapy to address the deficits listed in the problem list on initial evaluation provide pt/family education and to maximize pt's level  of independence in the home and community environment.     Anticipated barriers to occupational therapy: Attention span    Pt's spiritual, cultural and educational needs considered and pt agreeable to plan of care and goals.    Goals:  Long Term Goals (LTGs); to be met by discharge.  LTG #1: Pt will report a pain level of 1 out of 10 with activity.-  progressing         LTG #2: Pt will demo improved FOTO score by reaching CK G-Code. - progressing  LTG #3: Pt will return to prior level of function for ADLs and household management.-  progressing     Short Term Goals (STGs); to be met within 4 weeks (7/11/2018).  STG #1a: Pt will report 2 out of 10 pain level at rest. - not met, progressing  STG #2a: Pt will report/demo [place] with cooking. Not met, progressing  STG #2b: Pt will report/demo [place] with bathing and grooming. Partially met with bathing, met with grooming - progressing  STG #3a: Pt will demonstrate independence with issued HEP. - met 9/18/2018  STG #3b: Pt will demo improved wrist flexion and extension by 15 degrees each, needed to aid with dressing. Met 9/18/2018    Plan:    Pt to be treated by Occupational Therapy 2 times per week for 8 weeks during the certification period from 12/11/2018 to 2/11/2019 to achieve the established goals.      Discussed Plan of Care with patient: Yes  Updates/Grading for next session: Advance as tolerated      MARTHA Del Rio, JET

## 2019-01-09 ENCOUNTER — CLINICAL SUPPORT (OUTPATIENT)
Dept: REHABILITATION | Facility: HOSPITAL | Age: 54
End: 2019-01-09
Attending: ORTHOPAEDIC SURGERY
Payer: MEDICAID

## 2019-01-09 DIAGNOSIS — R53.1 WEAKNESS: ICD-10-CM

## 2019-01-09 PROCEDURE — 97530 THERAPEUTIC ACTIVITIES: CPT

## 2019-01-09 NOTE — PROGRESS NOTES
Occupational Therapy Re-evaluation Note     Name: Trudi Howard  Clinic Number: [id number]    Therapy Diagnosis:   Encounter Diagnosis   Name Primary?    Weakness      Physician: Kenneth Tang Jr., *    Physician Orders: Eval and Treat; PROM and AROM  Medical Diagnosis:   M25.531 (ICD-10-CM) - Right wrist pain   S62.101D (ICD-10-CM) - Closed fracture of right wrist with routine healing, subsequent encounter        Surgical Procedure and Date: N/A  Evaluation Date: 6/11/2018  Insurance Authorization Period Expiration: 12/12/2019, charge as TA  Plan of Care Certification Period:  12/11/2018 to 2/11/2019     Date of Return to MD: 1/24/2018    Imaging 7/2/2018: X-RAYS:  AP and lateral right hand healed distal radius fracture, good position, nondisplaced.    Visit date: 12/27/2018  Visit # / Visits authorized:2/ 30 (new referral)  Time In:11:10 AM  Time Out: 12:10 PM  Total Billable Time: 60 minutes  Charges: 4 TA    Precautions: Standard    Subjective     Pt reports that she was not compliant with her HEP. She stated that she did her HEP twice in the past 2 months.  Response to previous treatment:  Pt states that she is moving her hand more.  Functional change: Pt reports that she is now lifting pots.    Pain: 3-4/10  Location: right wrist      Objective        Edema. Measured in centimeters.    6/11/2018 6/11/2018 6/29/2018 7/27/2018 8/23/2018 9/20/2018 10/5/2018 11/26/2018 12/27/2018     Left Right Right Right Right Right Right Right Right   Wrist Crease 18 19.7 19.5 19.5 19.5 19.3 19.3 19.0 19.0   Midpalm 20.9 22 22 22 22 22 21.5 21.0 21.0   MCPs 20 21.4 21.4 21.4 20.5 20.5 20.5 20.0 20.0      Edema. Measured in centimeters.    6/11/2018 6/11/2018 6/29/2018 7/27/2018 8/23/2018 9/20/2018 10/5/2018 11/26/2018 12/27/2018     Left Right Right Right Right Right Right Right Right   Index:                P1 6.7 7.4 7.4 7.4 7.5 7.5 7.5 7.3 7.3   Long:                P1 6.6 7.2 7.2 7.2 7.0 7.0  7.0 6.5 6.5   Ring:                P1            6.7 7 7 7 7.0 7.0 7.0 7.0 7.0   Small:                 P1           6.1 6.3 6.3 6.3 6.5 6.5 6.5 6.5 6.5   Thumb:              Prox. Phalanx 7.2 7.2 7.2 7.2 7.2 7.2 7.2 7.1 7.1      Elbow and Wrist ROM. Measured in degrees.    6/11/2018 6/11/2018 6/29/2018 7/27/2018 8/23/2018 9/20/2018 10/5/2018 11/26/2018 12/27/2018     Left Right Right Right Right Right Right Right Right   Elbow Ext/Flex WNL WNL WNL WNL WNL WNL WNL WNL WNL   Supination/Pronation 50/90 50/90 60/90 85/90 85/90 85/90 WNL WNL WNL   Wrist Ext/Flex 70/65 36/46 50/40 50/55 50/60 55/65 55/65 50/60 55/60   Wrist RD/UD 25/60 10/26 10/25 15/30 15/35 20/35 20/35 20/35 20/35      Hand ROM. Measured in degrees.    6/11/2018 6/11/2018 6/29/2018 7/27/2018 8/23/2018 9/20/2018 10/5/2018 11/26/2018 12/27/2018     Left Right Right Right Right Right Right Right Right                  Index: MP  77 50 50 60 60 60 60 47 57              PIP     89 79 80 86 90 93 93 87 82              DIP 70 40 45 50 60 60 60 57 60              DURAN 236 169 175 196 210 213 213 191 199                  Long:  MP 80 44 47 55 55 55 55 41 50              PIP 86 84 80 82 90 90 90 28/90 28/90              DIP 80 48 50 55 60 60 60 49 53              DURAN 246 176 177 192 205 205 205 152 165                  Ring:   MP 79 40 40 45 50 50 50 32 42              PIP 90 86 80 80 89 90 90 17/86 17/86              DIP 76 64 64 65 70 70 70 57 65              DURAN 245 190 184 190 209 210 210 158 176                  Small:  MP 77 47 35 42 42 44 44 35 35               PIP 88 78 78 78 78 85 85 8/75 8/80               DIP 85 70 70 70 70 70 70 62 80              DURAN 250 195 183 190 190 199 199 164 187                  Thumb: MP 45 26 45 50 50 50 50 42 43                IP 66 44 45 55 60 65 65 62 62       Rad ADD/ABD 65 65 65 65 65 65 65 65 65       Pal ADD/ABD 55 60 60 60 60 60 60 60 60          Opposition To distal palmar crease To mid distal phalanx of SF 3cm 2 cm  1 cm Base of 5th Base of 5th .5 cm from base of 5th .5 cm base of 5th       Strength (Dyanmometer) and Pinch Strength (Pinch Gauge)  Measured in pounds.    6/11/2018 6/11/2018 7/13/2018 7/13/2018 8/23/2018 9/20/2018 10/5/2018     Left Right Left Right Right Right Right   Rung II defer defer 40 6 15 15 20   Mendieta Pinch     12 5 8 8 8   3pt Pinch     13 3 5 5 5   2 pt Pinch   10 5 3 3 4    Right Right         11/26/2018 12/27/2018        Rung II 23 23        Mendieta Pinch 9 10        3 pt Pinch 8 9        2 pt Pinch 5 9                    Trudi received therapeutic activities for 60  minutes including: fluidotherapy, RM and finger, thumb, wrist and forearm AROM as listed in the media section x 10 reps each  -Hand gripper with 1 red, 1 green and 1 yellow bands x 30 reps  -Wrist 3 ways with 3 # 2  x 15  reps  -2 plates on rotation bar 2 x 15 reps   -Green digi-extend x 30 reps  -Green digiflex x 30 reps  -Green putty - 2' molding, 15 grasps, 3 logs each (key and pinch)  - Red putty - 5 flowers  -Green powerweb x 30 reps      Home Exercises and Education Provided     Education provided:   -None today  -Written home exercise program as listed in the patient instruction's section.      Assessment     Continued strengthening exercises with no complaints of pain. Pt continues to be non-compliant with her HEP.     Trudi is progressing fair towards her goals and there are no updates to goals at this time. Pt prognosis is Fair.    Pt will continue to benefit from skilled outpatient occupational therapy to address the deficits listed in the problem list on initial evaluation provide pt/family education and to maximize pt's level of independence in the home and community environment.     Anticipated barriers to occupational therapy: Attention span    Pt's spiritual, cultural and educational needs considered and pt agreeable to plan of care and goals.    Goals:  Long Term Goals (LTGs); to be met by discharge.  LTG #1: Pt will report a  pain level of 1 out of 10 with activity.-  progressing         LTG #2: Pt will demo improved FOTO score by reaching CK G-Code. - progressing  LTG #3: Pt will return to prior level of function for ADLs and household management.-  progressing     Short Term Goals (STGs); to be met within 4 weeks (7/11/2018).  STG #1a: Pt will report 2 out of 10 pain level at rest. - not met, progressing  STG #2a: Pt will report/demo [place] with cooking. Not met, progressing  STG #2b: Pt will report/demo [place] with bathing and grooming. Partially met with bathing, met with grooming - progressing  STG #3a: Pt will demonstrate independence with issued HEP. - met 9/18/2018  STG #3b: Pt will demo improved wrist flexion and extension by 15 degrees each, needed to aid with dressing. Met 9/18/2018    Plan:    Pt to be treated by Occupational Therapy 2 times per week for 8 weeks during the certification period from 12/11/2018 to 2/11/2019 to achieve the established goals.      Discussed Plan of Care with patient: Yes  Updates/Grading for next session: Advance as tolerated      MARTHA Del Rio, JET

## 2019-01-18 ENCOUNTER — OFFICE VISIT (OUTPATIENT)
Dept: SURGERY | Facility: CLINIC | Age: 54
End: 2019-01-18
Payer: MEDICAID

## 2019-01-18 ENCOUNTER — TELEPHONE (OUTPATIENT)
Dept: INFECTIOUS DISEASES | Facility: CLINIC | Age: 54
End: 2019-01-18

## 2019-01-18 VITALS
DIASTOLIC BLOOD PRESSURE: 94 MMHG | HEIGHT: 69 IN | BODY MASS INDEX: 40.88 KG/M2 | WEIGHT: 276 LBS | SYSTOLIC BLOOD PRESSURE: 148 MMHG

## 2019-01-18 DIAGNOSIS — K43.2 INCISIONAL HERNIA, WITHOUT OBSTRUCTION OR GANGRENE: Primary | ICD-10-CM

## 2019-01-18 DIAGNOSIS — R56.9 SEIZURES: ICD-10-CM

## 2019-01-18 DIAGNOSIS — Z86.19 HISTORY OF SEPSIS: ICD-10-CM

## 2019-01-18 DIAGNOSIS — E66.01 MORBID OBESITY: ICD-10-CM

## 2019-01-18 PROCEDURE — 99214 PR OFFICE/OUTPT VISIT, EST, LEVL IV, 30-39 MIN: ICD-10-PCS | Mod: ,,, | Performed by: SURGERY

## 2019-01-18 PROCEDURE — 99214 OFFICE O/P EST MOD 30 MIN: CPT | Mod: ,,, | Performed by: SURGERY

## 2019-01-18 NOTE — LETTER
January 18, 2019      Dejon Rollins MD  [address]  [address]  [place] [address]           [facility] - General Surgery  [address]  [address]  [place] [address]  Phone: [phone]  Fax: [phone]          Patient: Trudi Howard   MR Number: [id number]   YOB: 1965   Date of Visit: 1/18/2019       Dear Dr. Dejon Rollins:    Thank you for referring Trudi Howard to me for evaluation. Attached you will find relevant portions of my assessment and plan of care.    If you have questions, please do not hesitate to call me. I look forward to following Trudi Howard along with you.    Sincerely,    Navid BORJA MD    Enclosure  CC:  No Recipients    If you would like to receive this communication electronically, please contact [email or web address] or [phone] to request more information on [employer] access.    For providers and/or their staff who would like to refer a patient to [facility], please contact us through our one-stop-shop provider referral line, [facility], at [phone].    If you feel you have received this communication in error or would no longer like to receive these types of communications, please e-mail [email or web address]

## 2019-01-18 NOTE — PROGRESS NOTES
Subjective:       Patient ID: Trudi Howard is a 53 y.o. female.    Chief Complaint: Other (Re-Eval incisional hernia )      HPI:  Patient was enlarging incisional hernia presents for repair. Original surgery was exploratory laparotomy for multiple abdominal abscesses of unknown etiology. This was in . Very difficult postoperative course with greater than 30 days in the hospital. Patient is morbidly obese. I have been following the patient for this hernia for over a year. It is getting larger and patient is very anxious to get it repaired. We had preoperative consultation performed by a infectious disease specialist to make sure that we have done everything we can to prevent mesh infection in the future. At this time patient has no health problems.    Past Medical History:   Diagnosis Date    History of uterine fibroid     Intra-abdominal abscess 2017    Urinary retention, ruptured bladder, infected urinoma, sepsis    Morbid obesity     RUSSELL (obstructive sleep apnea)     Ovarian cyst 2017    Seizures      Past Surgical History:   Procedure Laterality Date    BREAST SURGERY      BUNIONECTOMY       SECTION      Exploratory Laparotomy w/ Abscess Drainage  2017        fibroidectomy       Review of patient's allergies indicates:   Allergen Reactions    Aspirin      Contraindicated with other meds    Amoxicillin      rash    Milk containing products     Peanut         Medication List           Accurate as of 19 11:47 AM. If you have any questions, ask your nurse or doctor.               CONTINUE taking these medications    acetaZOLAMIDE 250 MG tablet  Commonly known as:  DIAMOX     albuterol 90 mcg/actuation inhaler  Commonly known as:  VENTOLIN HFA  Inhale 2 puffs into the lungs every 6 (six) hours as needed for Wheezing. Rescue     cetirizine 10 MG tablet  Commonly known as:  ZYRTEC  Take 1 tablet (10 mg total) by mouth once daily.     fluticasone 50 mcg/actuation nasal  spray  Commonly known as:  FLONASE  1 spray (50 mcg total) by Each Nare route once daily.     PHENobarbital 32.4 MG tablet  Commonly known as:  LUMINAL     ZARONTIN 250 mg Cap  Generic drug:  ethosuximide        STOP taking these medications    promethazine-codeine 6.25-10 mg/5 ml 6.25-10 mg/5 mL syrup  Commonly known as:  PHENERGAN with CODEINE  Stopped by:  Navid Martin MD          Family History   Adopted: Yes   Problem Relation Age of Onset    Heart disease Mother      Social History     Socioeconomic History    Marital status: Legally      Spouse name: None    Number of children: None    Years of education: None    Highest education level: None   Social Needs    Financial resource strain: None    Food insecurity - worry: None    Food insecurity - inability: None    Transportation needs - medical: None    Transportation needs - non-medical: None   Occupational History    None   Tobacco Use    Smoking status: Never Smoker    Smokeless tobacco: Never Used   Substance and Sexual Activity    Alcohol use: Yes     Comment: seldom    Drug use: No    Sexual activity: Yes     Partners: Male     Birth control/protection: None     Comment:    Other Topics Concern    None   Social History Narrative    None         Review of Systems    Objective:      Physical Exam   Constitutional: She appears well-developed and well-nourished.  Non-toxic appearance. No distress.   Morbidly obese   HENT:   Head: Normocephalic and atraumatic. Head is without abrasion and without laceration.   Right Ear: External ear normal.   Left Ear: External ear normal.   Nose: Nose normal.   Mouth/Throat: Oropharynx is clear and moist.   Eyes: EOM are normal. Pupils are equal, round, and reactive to light.   Neck: Trachea normal. No tracheal deviation and normal range of motion present. No thyroid mass and no thyromegaly present.   Cardiovascular: Normal rate and regular rhythm.   Pulmonary/Chest: Effort normal. No  accessory muscle usage. No tachypnea. No respiratory distress.   Abdominal: Soft. Normal appearance and bowel sounds are normal. She exhibits no distension and no mass. There is no hepatosplenomegaly. There is no tenderness. There is no tenderness at McBurney's point and negative Alvarez's sign. No hernia.       Abdomen is obese. Moderate size reducible soft incisional hernia.   Lymphadenopathy:     She has no cervical adenopathy.     She has no axillary adenopathy.        Right: No inguinal adenopathy present.        Left: No inguinal adenopathy present.   Neurological: She is alert. Coordination and gait normal.   Skin: Skin is warm and intact.   Psychiatric: She has a normal mood and affect. Her speech is normal and behavior is normal.   Unusual affect       Assessment/Plan:   Incisional hernia, without obstruction or gangrene  -     CBC auto differential; Future; Expected date: 01/18/2019  -     Comprehensive metabolic panel; Future; Expected date: 01/18/2019  -     EKG 12-lead; Future  -     Ambulatory Referral to External Surgery    Seizures    History of sepsis    Morbid obesity        Planned procedure: l/s incisional hernia repair    Ancef 2 gm IV on call to OR unless allergic, if allergic use Vancomycin per pharmacy dosing    NPO past midnight    Nico cloth scrub per protocol    SCDs Bilateral Lower Extremities    I discussed the proposed procedures the the patient including risks, benefits, indications, alternatives and special concerns.  The patient appears to understand and agrees to go ahead with surgery.  I have made no promises, warranties or verbal agreements beyond what was discussed above.    No Follow-up on file.

## 2019-01-18 NOTE — TELEPHONE ENCOUNTER
"----- Message from Marybeth Milan MD sent at 1/18/2019  1:28 PM CST -----  Contact: Self please call [phone]  Please let her know that I have no additional recommendations.   You can review her exit instructions from nov visit. The MRSA screen was negative so she does not need to decolonize.  She should follow all of Dr. lieberman's instructions and pre-op cleansing instructions to the "T"  ----- Message -----  From: Thomas Ayala  Sent: 1/18/2019  12:22 PM  To: Marybeth Milan MD    The patient is having hernia surgery and wanted to know if there was anything specifically she needs to do before surgery.  Please advise and we will alert the patient or her best # of contact is [phone].  Thanks in advance      Mahogany"

## 2019-01-24 ENCOUNTER — TELEPHONE (OUTPATIENT)
Dept: ORTHOPEDICS | Facility: CLINIC | Age: 54
End: 2019-01-24

## 2019-01-24 NOTE — TELEPHONE ENCOUNTER
----- Message from Hermila Pedraza sent at 1/24/2019  7:20 AM CST -----  Contact: [phone]/self  Patient requesting to speak with you regarding rescheduling her appointment. Please advise.

## 2019-01-24 NOTE — TELEPHONE ENCOUNTER
----- Message from Angel Gillis sent at 1/24/2019  7:28 AM CST -----  Contact: [facility]/[phone]  Patient called to request that you please reschedule her for Tuesday, 01/29/19 as she has a sick child at home today and cannot make this appointment.    Please call to confirm.

## 2019-01-25 ENCOUNTER — CLINICAL SUPPORT (OUTPATIENT)
Dept: REHABILITATION | Facility: HOSPITAL | Age: 54
End: 2019-01-25
Attending: ORTHOPAEDIC SURGERY
Payer: MEDICAID

## 2019-01-25 DIAGNOSIS — R53.1 WEAKNESS: ICD-10-CM

## 2019-01-25 PROCEDURE — 97530 THERAPEUTIC ACTIVITIES: CPT

## 2019-01-25 NOTE — PROGRESS NOTES
Occupational Therapy Re-evaluation Note     Name: Trudi Howard  Clinic Number: [id number]    Therapy Diagnosis:   Encounter Diagnosis   Name Primary?    Weakness      Physician: Kenneth Tang Jr., *    Physician Orders: Eval and Treat; PROM and AROM  Medical Diagnosis:   M25.531 (ICD-10-CM) - Right wrist pain   S62.101D (ICD-10-CM) - Closed fracture of right wrist with routine healing, subsequent encounter        Surgical Procedure and Date: N/A  Evaluation Date: 6/11/2018  Insurance Authorization Period Expiration: 12/12/2019, charge as TA  Plan of Care Certification Period:  12/11/2018 to 2/11/2019     Date of Return to MD: 1/24/2018    Imaging 7/2/2018: X-RAYS:  AP and lateral right hand healed distal radius fracture, good position, nondisplaced.    Visit date: 1/25/2019  Visit # / Visits authorized:3/ 30 (new referral)  Time In:10:05 AM  Time Out: 11:05 AM  Total Billable Time: 60 minutes  Charges: 4 TA    Precautions: Standard    Subjective     Pt reports that she was not compliant with her HEP. She stated that she did her HEP twice in the past 2 months.  Response to previous treatment:  Pt states that she is moving her hand more.  Functional change: Pt reports that she is now lifting pots.    Pain: 3-4/10  Location: right wrist      Objective        Edema. Measured in centimeters.    6/11/2018 6/11/2018 6/29/2018 7/27/2018 8/23/2018 9/20/2018 10/5/2018 11/26/2018 12/27/2018     Left Right Right Right Right Right Right Right Right   Wrist Crease 18 19.7 19.5 19.5 19.5 19.3 19.3 19.0 19.0   Midpalm 20.9 22 22 22 22 22 21.5 21.0 21.0   MCPs 20 21.4 21.4 21.4 20.5 20.5 20.5 20.0 20.0      Edema. Measured in centimeters.    6/11/2018 6/11/2018 6/29/2018 7/27/2018 8/23/2018 9/20/2018 10/5/2018 11/26/2018 12/27/2018     Left Right Right Right Right Right Right Right Right   Index:                P1 6.7 7.4 7.4 7.4 7.5 7.5 7.5 7.3 7.3   Long:                P1 6.6 7.2 7.2 7.2 7.0 7.0  7.0 6.5 6.5   Ring:                P1            6.7 7 7 7 7.0 7.0 7.0 7.0 7.0   Small:                 P1           6.1 6.3 6.3 6.3 6.5 6.5 6.5 6.5 6.5   Thumb:              Prox. Phalanx 7.2 7.2 7.2 7.2 7.2 7.2 7.2 7.1 7.1      Elbow and Wrist ROM. Measured in degrees.    6/11/2018 6/11/2018 6/29/2018 7/27/2018 8/23/2018 9/20/2018 10/5/2018 11/26/2018 12/27/2018     Left Right Right Right Right Right Right Right Right   Elbow Ext/Flex WNL WNL WNL WNL WNL WNL WNL WNL WNL   Supination/Pronation 50/90 50/90 60/90 85/90 85/90 85/90 WNL WNL WNL   Wrist Ext/Flex 70/65 36/46 50/40 50/55 50/60 55/65 55/65 50/60 55/60   Wrist RD/UD 25/60 10/26 10/25 15/30 15/35 20/35 20/35 20/35 20/35      Hand ROM. Measured in degrees.    6/11/2018 6/11/2018 6/29/2018 7/27/2018 8/23/2018 9/20/2018 10/5/2018 11/26/2018 12/27/2018     Left Right Right Right Right Right Right Right Right                  Index: MP  77 50 50 60 60 60 60 47 57              PIP     89 79 80 86 90 93 93 87 82              DIP 70 40 45 50 60 60 60 57 60              DURAN 236 169 175 196 210 213 213 191 199                  Long:  MP 80 44 47 55 55 55 55 41 50              PIP 86 84 80 82 90 90 90 28/90 28/90              DIP 80 48 50 55 60 60 60 49 53              DURAN 246 176 177 192 205 205 205 152 165                  Ring:   MP 79 40 40 45 50 50 50 32 42              PIP 90 86 80 80 89 90 90 17/86 17/86              DIP 76 64 64 65 70 70 70 57 65              DURAN 245 190 184 190 209 210 210 158 176                  Small:  MP 77 47 35 42 42 44 44 35 35               PIP 88 78 78 78 78 85 85 8/75 8/80               DIP 85 70 70 70 70 70 70 62 80              DURAN 250 195 183 190 190 199 199 164 187                  Thumb: MP 45 26 45 50 50 50 50 42 43                IP 66 44 45 55 60 65 65 62 62       Rad ADD/ABD 65 65 65 65 65 65 65 65 65       Pal ADD/ABD 55 60 60 60 60 60 60 60 60          Opposition To distal palmar crease To mid distal phalanx of SF 3cm 2 cm  1 cm Base of 5th Base of 5th .5 cm from base of 5th .5 cm base of 5th       Strength (Dyanmometer) and Pinch Strength (Pinch Gauge)  Measured in pounds.    6/11/2018 6/11/2018 7/13/2018 7/13/2018 8/23/2018 9/20/2018 10/5/2018     Left Right Left Right Right Right Right   Rung II defer defer 40 6 15 15 20   Mendieta Pinch     12 5 8 8 8   3pt Pinch     13 3 5 5 5   2 pt Pinch   10 5 3 3 4    Right Right         11/26/2018 12/27/2018        Rung II 23 23        Mendieta Pinch 9 10        3 pt Pinch 8 9        2 pt Pinch 5 9                    Trudi received therapeutic activities for 60  minutes including: fluidotherapy, RM and finger, thumb, wrist and forearm AROM as listed in the media section x 10 reps each  -Hand gripper with 1 red, 1 green and 1 yellow bands x 30 reps  -Wrist 3 ways with 3 # 2  x 15  reps  -2 plates on rotation bar 2 x 15 reps   -Green digi-extend x 30 reps  -Green digiflex x 30 reps  -Green putty - 2' molding, 15 grasps, 3 logs each (key and pinch)  - Red putty - 5 flowers  -Green powerweb x 30 reps      Home Exercises and Education Provided     Education provided:   -None today  -Written home exercise program as listed in the patient instruction's section.      Assessment     Continued strengthening exercises with no complaints of pain. Pt continues to be non-compliant with her HEP.     Trudi is progressing fair towards her goals and there are no updates to goals at this time. Pt prognosis is Fair.    Pt will continue to benefit from skilled outpatient occupational therapy to address the deficits listed in the problem list on initial evaluation provide pt/family education and to maximize pt's level of independence in the home and community environment.     Anticipated barriers to occupational therapy: Attention span    Pt's spiritual, cultural and educational needs considered and pt agreeable to plan of care and goals.    Goals:  Long Term Goals (LTGs); to be met by discharge.  LTG #1: Pt will report a  pain level of 1 out of 10 with activity.-  progressing         LTG #2: Pt will demo improved FOTO score by reaching CK G-Code. - progressing  LTG #3: Pt will return to prior level of function for ADLs and household management.-  progressing     Short Term Goals (STGs); to be met within 4 weeks (7/11/2018).  STG #1a: Pt will report 2 out of 10 pain level at rest. - not met, progressing  STG #2a: Pt will report/demo [place] with cooking. Not met, progressing  STG #2b: Pt will report/demo [place] with bathing and grooming. Partially met with bathing, met with grooming - progressing  STG #3a: Pt will demonstrate independence with issued HEP. - met 9/18/2018  STG #3b: Pt will demo improved wrist flexion and extension by 15 degrees each, needed to aid with dressing. Met 9/18/2018    Plan:    Pt to be treated by Occupational Therapy 2 times per week for 8 weeks during the certification period from 12/11/2018 to 2/11/2019 to achieve the established goals.      Discussed Plan of Care with patient: Yes  Updates/Grading for next session: Advance as tolerated      MARTHA Del Rio, JET

## 2019-01-28 LAB
ALBUMIN SERPL-MCNC: 3.8 G/DL (ref 3.1–4.7)
ALP SERPL-CCNC: 178 IU/L (ref 40–104)
ALT (SGPT): 29 IU/L (ref 3–33)
AST SERPL-CCNC: 21 IU/L (ref 10–40)
BASOPHILS NFR BLD: 0 K/UL (ref 0–0.2)
BASOPHILS NFR BLD: 0.6 %
BILIRUB SERPL-MCNC: 0.4 MG/DL (ref 0.3–1)
BUN SERPL-MCNC: 13 MG/DL (ref 8–20)
CALCIUM SERPL-MCNC: 9.2 MG/DL (ref 7.7–10.4)
CHLORIDE: 112 MMOL/L (ref 98–110)
CO2 SERPL-SCNC: 20.4 MMOL/L (ref 22.8–31.6)
CREATININE: 0.72 MG/DL (ref 0.6–1.4)
EOSINOPHIL NFR BLD: 0.2 K/UL (ref 0–0.7)
EOSINOPHIL NFR BLD: 3 %
ERYTHROCYTE [DISTWIDTH] IN BLOOD BY AUTOMATED COUNT: 13.6 % (ref 11.7–14.9)
GLUCOSE: 100 MG/DL (ref 70–99)
GRAN #: 3.7 K/UL (ref 1.4–6.5)
GRAN%: 54.1 %
HCT VFR BLD AUTO: 38.7 % (ref 36–48)
HGB BLD-MCNC: 12.7 G/DL (ref 12–15)
IMMATURE GRANS (ABS): 0 K/UL (ref 0–1)
IMMATURE GRANULOCYTES: 0.4 %
LYMPH #: 2.3 K/UL (ref 1.2–3.4)
LYMPH%: 33.7 %
MCH RBC QN AUTO: 33.4 PG (ref 25–35)
MCHC RBC AUTO-ENTMCNC: 32.8 G/DL (ref 31–36)
MCV RBC AUTO: 101.8 FL (ref 79–98)
MONO #: 0.6 K/UL (ref 0.1–0.6)
MONO%: 8.2 %
NUCLEATED RBCS: 0 %
PLATELET # BLD AUTO: 226 K/UL (ref 140–440)
PMV BLD AUTO: 9.8 FL (ref 8.8–12.7)
POTASSIUM SERPL-SCNC: 3.8 MMOL/L (ref 3.5–5)
PROT SERPL-MCNC: 7.7 G/DL (ref 6–8.2)
RBC # BLD AUTO: 3.8 M/UL (ref 3.5–5.5)
SODIUM: 142 MMOL/L (ref 134–144)
WBC # BLD AUTO: 6.9 K/UL (ref 5–10)

## 2019-02-05 LAB — B-HCG UR QL: NEGATIVE

## 2019-02-14 ENCOUNTER — OFFICE VISIT (OUTPATIENT)
Dept: SURGERY | Facility: CLINIC | Age: 54
End: 2019-02-14
Payer: MEDICAID

## 2019-02-14 VITALS
BODY MASS INDEX: 40.88 KG/M2 | WEIGHT: 276 LBS | SYSTOLIC BLOOD PRESSURE: 148 MMHG | DIASTOLIC BLOOD PRESSURE: 94 MMHG | HEIGHT: 69 IN

## 2019-02-14 DIAGNOSIS — K43.2 INCISIONAL HERNIA, WITHOUT OBSTRUCTION OR GANGRENE: Primary | ICD-10-CM

## 2019-02-14 PROCEDURE — 99024 POSTOP FOLLOW-UP VISIT: CPT | Mod: ,,, | Performed by: SURGERY

## 2019-02-14 PROCEDURE — 99024 PR POST-OP FOLLOW-UP VISIT: ICD-10-PCS | Mod: ,,, | Performed by: SURGERY

## 2019-02-14 RX ORDER — PHENOBARBITAL 30 MG/1
TABLET ORAL
Refills: 5 | COMMUNITY
Start: 2019-02-06 | End: 2019-08-16

## 2019-02-14 NOTE — PROGRESS NOTES
Subjective:       Patient ID: Trudi Howard is a 53 y.o. female.    Chief Complaint: Post-op Evaluation (FU DOS 2/5/19 L/S incisional hernia repair )      HPI:  Trudi Howard is here for post-op. Patient has no systemic complaints. Post operative   pain is under control.  Tolerating diet, no nausea/vomiting.  Having normal bowel movements.        Objective:      Physical Exam   Constitutional: She is oriented to person, place, and time. She is cooperative. No distress.   Abdominal: Soft. She exhibits no distension. There is no tenderness. There is no rebound and no guarding.   Neurological: She is oriented to person, place, and time.   Skin:   Incisions are clean, dry and intact  There is no evidence of infection, hematoma or seroma        Assessment/Plan:   Incisional hernia, without obstruction or gangrene      And well postop      Follow-up if symptoms worsen or fail to improve.

## 2019-03-07 ENCOUNTER — OFFICE VISIT (OUTPATIENT)
Dept: ORTHOPEDICS | Facility: CLINIC | Age: 54
End: 2019-03-07
Payer: MEDICAID

## 2019-03-07 VITALS — BODY MASS INDEX: 40.88 KG/M2 | HEIGHT: 69 IN | WEIGHT: 276 LBS

## 2019-03-07 DIAGNOSIS — M65.331 TRIGGER MIDDLE FINGER OF RIGHT HAND: Primary | ICD-10-CM

## 2019-03-07 PROCEDURE — 99213 OFFICE O/P EST LOW 20 MIN: CPT | Mod: 25,S$PBB,, | Performed by: ORTHOPAEDIC SURGERY

## 2019-03-07 PROCEDURE — 20550 NJX 1 TENDON SHEATH/LIGAMENT: CPT | Mod: F7,S$PBB,, | Performed by: ORTHOPAEDIC SURGERY

## 2019-03-07 PROCEDURE — 99213 OFFICE O/P EST LOW 20 MIN: CPT | Mod: PBBFAC,PN | Performed by: ORTHOPAEDIC SURGERY

## 2019-03-07 PROCEDURE — 20550 NJX 1 TENDON SHEATH/LIGAMENT: CPT | Mod: PBBFAC,PN | Performed by: ORTHOPAEDIC SURGERY

## 2019-03-07 PROCEDURE — 99213 PR OFFICE/OUTPT VISIT, EST, LEVL III, 20-29 MIN: ICD-10-PCS | Mod: 25,S$PBB,, | Performed by: ORTHOPAEDIC SURGERY

## 2019-03-07 PROCEDURE — 20550 PR INJECT TENDON SHEATH/LIGAMENT: ICD-10-PCS | Mod: F7,S$PBB,, | Performed by: ORTHOPAEDIC SURGERY

## 2019-03-07 PROCEDURE — 99999 PR PBB SHADOW E&M-EST. PATIENT-LVL III: CPT | Mod: PBBFAC,,, | Performed by: ORTHOPAEDIC SURGERY

## 2019-03-07 PROCEDURE — 99999 PR PBB SHADOW E&M-EST. PATIENT-LVL III: ICD-10-PCS | Mod: PBBFAC,,, | Performed by: ORTHOPAEDIC SURGERY

## 2019-03-07 RX ORDER — TRIAMCINOLONE ACETONIDE 40 MG/ML
40 INJECTION, SUSPENSION INTRA-ARTICULAR; INTRAMUSCULAR
Status: COMPLETED | OUTPATIENT
Start: 2019-03-07 | End: 2019-03-07

## 2019-03-07 RX ADMIN — TRIAMCINOLONE ACETONIDE 40 MG: 40 INJECTION, SUSPENSION INTRA-ARTICULAR; INTRAMUSCULAR at 11:03

## 2019-03-07 NOTE — PROGRESS NOTES
HISTORY OF PRESENT ILLNESS:  Ms. Howard in followup triggering of the right   middle and ring fingers, is having a little bit worsening symptoms.  We did an   injection a few months back, which helped out for a while but then symptoms   recurred.  She is having some stiffness of those fingers as well.  No numbness   or tingling is reported.    PHYSICAL EXAMINATION:  RIGHT HAND:  She does have slight flexion contractures of the PIP joint of the   middle and ring finger and she has pain when I tried passively extend her there.    Some mild triggering with flexion as well.  Sensation intact.  Tinel sign   negative.    IMPRESSION:  Triggering right middle and ring fingers with slight flexion   contractures.    PLAN:  I explained the nature of the problem to the patient.  I actually think   we need to consider surgery for this to include trigger release and joint   contracture release, but she really she is just not interested in surgery.  She   just had surgery on her hernia recently and wants to hold off on any more   surgery from now, so we will try an injection today.    After pause for timeout, she identified the right middle and ring fingers,   injected the flexor tendon sheath of each finger with combination of Kenalog 20   mg, 0.5 mL Xylocaine, sterile technique.  She tolerated the procedure well   without complication.    I have also recommended some stretching exercises to help with the contracture   and followup two months, at which time if she is not improving, we need to   consider surgery.      KYLEE  dd: 03/07/2019 11:43:48 (CST)  td: 03/08/2019 05:25:24 (CST)  Doc ID   #[id number]  Job ID #[id number]    CC:

## 2019-04-09 ENCOUNTER — TELEPHONE (OUTPATIENT)
Dept: ORTHOPEDICS | Facility: CLINIC | Age: 54
End: 2019-04-09

## 2019-04-09 NOTE — TELEPHONE ENCOUNTER
----- Message from Mary Goddard sent at 4/9/2019  9:28 AM CDT -----  Contact: self  Pt is calling to inform you that she needs orders for OT.      She can be reached at [phone]

## 2019-04-25 ENCOUNTER — TELEPHONE (OUTPATIENT)
Dept: ORTHOPEDICS | Facility: CLINIC | Age: 54
End: 2019-04-25

## 2019-04-25 DIAGNOSIS — M65.30 TRIGGER FINGER, UNSPECIFIED FINGER, UNSPECIFIED LATERALITY: Primary | ICD-10-CM

## 2019-04-25 NOTE — TELEPHONE ENCOUNTER
----- Message from Anitra Lantigua sent at 4/24/2019 10:14 AM CDT -----  Contact: Self [phone]  Patient would like to speak with you about needing orders for therapy for her hand and has not received anything since 3/7/19. Patient would like the message sent on high alert. Please advise

## 2019-05-09 ENCOUNTER — OFFICE VISIT (OUTPATIENT)
Dept: ORTHOPEDICS | Facility: CLINIC | Age: 54
End: 2019-05-09
Payer: MEDICAID

## 2019-05-09 VITALS — BODY MASS INDEX: 40.88 KG/M2 | WEIGHT: 276 LBS | HEIGHT: 69 IN

## 2019-05-09 DIAGNOSIS — M65.331 TRIGGER MIDDLE FINGER OF RIGHT HAND: Primary | ICD-10-CM

## 2019-05-09 PROCEDURE — 99213 PR OFFICE/OUTPT VISIT, EST, LEVL III, 20-29 MIN: ICD-10-PCS | Mod: S$PBB,,, | Performed by: ORTHOPAEDIC SURGERY

## 2019-05-09 PROCEDURE — 99213 OFFICE O/P EST LOW 20 MIN: CPT | Mod: PBBFAC,PN | Performed by: ORTHOPAEDIC SURGERY

## 2019-05-09 PROCEDURE — 99213 OFFICE O/P EST LOW 20 MIN: CPT | Mod: S$PBB,,, | Performed by: ORTHOPAEDIC SURGERY

## 2019-05-09 PROCEDURE — 99999 PR PBB SHADOW E&M-EST. PATIENT-LVL III: ICD-10-PCS | Mod: PBBFAC,,, | Performed by: ORTHOPAEDIC SURGERY

## 2019-05-09 PROCEDURE — 99999 PR PBB SHADOW E&M-EST. PATIENT-LVL III: CPT | Mod: PBBFAC,,, | Performed by: ORTHOPAEDIC SURGERY

## 2019-05-09 NOTE — PROGRESS NOTES
Subjective:      Patient ID: Trudi Howard is a 53 y.o. female.  Chief Complaint: Follow-up of the Right Wrist      HPI  Trudi Howard is a  53 y.o. female presenting today for follow up of triggering of the right middle and ring fingers.  She reports that she is having less pain but still having stiffness in the fingers  Last visit I ordered some therapy but is only just now been set up she is scheduled for tomorrow  Her main problem is stiffness and limited use in the ring and middle finger.    Review of patient's allergies indicates:   Allergen Reactions    Aspirin      Contraindicated with other meds    Amoxicillin      rash    Milk containing products     Peanut          Current Outpatient Medications   Medication Sig Dispense Refill    acetaZOLAMIDE (DIAMOX) 250 MG tablet   4    ethosuximide (ZARONTIN) 250 mg capsule Take 250 mg by mouth 2 (two) times daily.        PHENobarbital (LUMINAL) 30 MG tablet TK 3 TS PO QAM AND 5 TS QHS  5    phenobarbital (LUMINAL) 32.4 MG tablet 3 QAM AND 5 TS HS  5    albuterol (VENTOLIN HFA) 90 mcg/actuation inhaler Inhale 2 puffs into the lungs every 6 (six) hours as needed for Wheezing. Rescue 18 g 0    cetirizine (ZYRTEC) 10 MG tablet Take 1 tablet (10 mg total) by mouth once daily. 30 tablet 2    fluticasone (FLONASE) 50 mcg/actuation nasal spray 1 spray (50 mcg total) by Each Nare route once daily. 1 Bottle 2     No current facility-administered medications for this visit.        Past Medical History:   Diagnosis Date    History of uterine fibroid     Intra-abdominal abscess 2017    Urinary retention, ruptured bladder, infected urinoma, sepsis    Morbid obesity     RUSSELL (obstructive sleep apnea)     Ovarian cyst 2017    Seizures        Past Surgical History:   Procedure Laterality Date    BREAST SURGERY      BUNIONECTOMY       SECTION      Exploratory Laparotomy w/ Abscess Drainage  2017        fibroidectomy      INCISIONAL  "HERNIA REPAIR  02/05/2019    Laparoscopic-        OBJECTIVE:   PHYSICAL EXAM:  Height: 5' 9" (175.3 cm) Weight: 125.2 kg (276 lb)  Vitals:    05/09/19 1111   Weight: 125.2 kg (276 lb)   Height: 5' 9" (1.753 m)   PainSc: 0-No pain     Ortho/SPM Exam  Examination right hand she does have slight flexion contractures of the PIP joint right middle and ring she has good flexion but limited full active and passive extension.   strength slightly decreased  There is mild tenderness at the A1 pulley of both the fingers  Sensation intact    RADIOGRAPHS:  None  Comments: I have personally reviewed the imaging and I agree with the above radiologist's report.    ASSESSMENT/PLAN:     IMPRESSION:  Triggering right middle and ring fingers with flexion contracture    PLAN:  Since she is already set up with therapy and wanted to try therapy for a few weeks and reassess  Follow-up 3 weeks if symptoms have not improved then I would like her to consider surgery for trigger finger release    FOLLOW UP:  3-4 weeks    Disclaimer: This note has been generated using voice-recognition software. There may be typographical errors that have been missed during proof-reading.  "

## 2019-05-10 ENCOUNTER — TELEPHONE (OUTPATIENT)
Dept: ORTHOPEDICS | Facility: CLINIC | Age: 54
End: 2019-05-10

## 2019-05-10 ENCOUNTER — CLINICAL SUPPORT (OUTPATIENT)
Dept: REHABILITATION | Facility: HOSPITAL | Age: 54
End: 2019-05-10
Attending: ORTHOPAEDIC SURGERY
Payer: MEDICAID

## 2019-05-10 DIAGNOSIS — M79.641 PAIN IN RIGHT HAND: ICD-10-CM

## 2019-05-10 DIAGNOSIS — M25.641 DECREASED RANGE OF MOTION OF FINGER OF RIGHT HAND: ICD-10-CM

## 2019-05-10 PROCEDURE — 97022 WHIRLPOOL THERAPY: CPT

## 2019-05-10 PROCEDURE — 97110 THERAPEUTIC EXERCISES: CPT

## 2019-05-10 PROCEDURE — 97165 OT EVAL LOW COMPLEX 30 MIN: CPT

## 2019-05-10 NOTE — PATIENT INSTRUCTIONS
"[facility] - OCCUPATIONAL THERAPY  HOME EXERCISE PROGRAM     Complete the following massages for 2 minutes each, 2x/day.                       Complete the following exercises with 10 repetitions each, 3 x/day.     AROM: DIP Flexion / Extension  Pinch middle knuckle to prevent bending. Bend end knuckle until stretch is felt.   Hold 3 seconds. Relax. Straighten finger as far as possible.    AROM: PIP Flexion / Extension  Pinch bottom knuckle  to prevent bending. Actively bend middle knuckle until stretch is felt.   Hold 3 seconds. Relax. Straighten finger as far as possible.        AROM: Isolated MCP Flexion / Extension ("Wave")   Bend only your large, bottom knuckles. Hold 3 seconds. Keep the tips of your fingers straight. Straighten fingers.    AROM: Isolated IPJ Flexion / Extension ("Hook")  Bend only your middle and end knuckles. Hold 3 seconds.   Straighten your fingers.            AROM: Composte Extension ("Finger Lifts")  Lift your finger off of the table one at a time. Hold 3 seconds. Relax your finger.    AROM: Abduction / Adduction  With hand flat on table, spread all fingers apart, then bring them together as close as possible.    Copyright © I. All rights reserved.     Therapist: MARTHA Del Rio CHT    "

## 2019-05-10 NOTE — TELEPHONE ENCOUNTER
Called pt in reference to her message but I was unable to leave a message due to her mailbox being full.
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
## 2019-05-10 NOTE — TELEPHONE ENCOUNTER
----- Message from Gertrude Jean sent at 5/10/2019  2:33 PM CDT -----  Contact: Self/ [phone]  Patient needs to reschedule her appointment since she is still in physical therapy. She is looking to come at the end of June.    Please call.

## 2019-05-14 ENCOUNTER — TELEPHONE (OUTPATIENT)
Dept: ORTHOPEDICS | Facility: CLINIC | Age: 54
End: 2019-05-14

## 2019-05-14 DIAGNOSIS — Z12.11 COLON CANCER SCREENING: ICD-10-CM

## 2019-05-14 NOTE — TELEPHONE ENCOUNTER
----- Message from Danica Sifuentes sent at 5/14/2019 10:36 AM CDT -----  No. [phone]   Patient has an appointment on 5/30/19 with RANDALL Hebert.  She would like to reschedule the appointment for Thursday, 6/13/19 in the afternoon with Dr. Tang.

## 2019-05-20 ENCOUNTER — OFFICE VISIT (OUTPATIENT)
Dept: SURGERY | Facility: CLINIC | Age: 54
End: 2019-05-20
Payer: MEDICAID

## 2019-05-20 VITALS
WEIGHT: 274 LBS | HEART RATE: 64 BPM | SYSTOLIC BLOOD PRESSURE: 140 MMHG | DIASTOLIC BLOOD PRESSURE: 85 MMHG | BODY MASS INDEX: 40.58 KG/M2 | HEIGHT: 69 IN | TEMPERATURE: 98 F

## 2019-05-20 DIAGNOSIS — K43.2 INCISIONAL HERNIA, WITHOUT OBSTRUCTION OR GANGRENE: Primary | ICD-10-CM

## 2019-05-20 PROCEDURE — 99213 OFFICE O/P EST LOW 20 MIN: CPT | Mod: ,,, | Performed by: SURGERY

## 2019-05-20 PROCEDURE — 99213 PR OFFICE/OUTPT VISIT, EST, LEVL III, 20-29 MIN: ICD-10-PCS | Mod: ,,, | Performed by: SURGERY

## 2019-05-20 NOTE — LETTER
May 20, 2019      Dejon Rollins MD  [address]  [address]  [facility] [address]           [facility] - General Surgery  [address]  [facility] [address]  Phone: [phone]  Fax: [phone]          Patient: Trudi Howard   MR Number: [id number]   YOB: 1965   Date of Visit: 5/20/2019       Dear Dr. Dejon Rollins:    Thank you for referring Trudi Howard to me for evaluation. Attached you will find relevant portions of my assessment and plan of care.    If you have questions, please do not hesitate to call me. I look forward to following Trudi Howard along with you.    Sincerely,    Navid BORJA MD    Enclosure  CC:  No Recipients    If you would like to receive this communication electronically, please contact [email or web address] or [phone] to request more information on [employer] access.    For providers and/or their staff who would like to refer a patient to [facility], please contact us through our one-stop-shop provider referral line, [facility], at [phone].    If you feel you have received this communication in error or would no longer like to receive these types of communications, please e-mail [email or web address]

## 2019-05-20 NOTE — PROGRESS NOTES
Approximately 3 days ago patient bent over and felt an epigastric lump which was somewhat tender. It went away after several days. It was approximately the size of patient's fist. There were no other symptoms associated with it.    Patient was worried about a recurrence and comes in for an exam.    Patient was examined by me. All wounds are well-healed. The area patient points to his next #12 mm port site. I could not feel a hernia in that area. Effect I don't feel a hernia at all.    Impression/plan: If patient does have a hernia or an incisional hernia at the port site I cannot feel it today. At this point I would just observe the situation reexamine the patient in a few months. Sooner if any symptoms arise.

## 2019-05-31 ENCOUNTER — CLINICAL SUPPORT (OUTPATIENT)
Dept: REHABILITATION | Facility: HOSPITAL | Age: 54
End: 2019-05-31
Payer: MEDICAID

## 2019-05-31 DIAGNOSIS — M79.641 PAIN IN RIGHT HAND: ICD-10-CM

## 2019-05-31 DIAGNOSIS — M25.641 DECREASED RANGE OF MOTION OF FINGER OF RIGHT HAND: ICD-10-CM

## 2019-05-31 PROCEDURE — 97530 THERAPEUTIC ACTIVITIES: CPT

## 2019-05-31 NOTE — PROGRESS NOTES
Occupational Therapy Daily Treatment Note     Date: 5/31/2019  Name: Trudi Howard  Clinic Number: [id number]    Therapy Diagnosis:   Encounter Diagnoses   Name Primary?    Decreased range of motion of finger of right hand     Pain in right hand      Physician: Kenneth Tang Jr., *  Physician Orders: Eval and treat  Other Comments: has had 2 steroid injections in her right hand  Medical Diagnosis:   M65.30 (ICD-10-CM) - Trigger finger, unspecified finger, unspecified laterality   Referral Notes   Number of Notes: 1      Surgical Procedure and Date: N/A  Evaluation Date: 5/10/2019  Insurance: Medicaid  Insurance Authorization period Expiration: 07/29/2019      Plan of Care Certification Period: 5/10/2019 to 7/10/2019     Visit # / Visits Authortized: 2/ 20  Time In:10:15 AM  Time Out: 11:00 AM  Total Time: 45 minutes     Precautions: Standard           Subjective     Pt reports: she was not compliant with home exercise program given last session. Pt reports that she lost her splint and her HEP but found them 2 days ago.  Response to previous treatment:No change  Functional change: none    Pain: 1/10  Location: right hand     Objective   Edema. Measured in centimeters.    5/10/2019 5/10/2019     Left Right   Long:         P1 6.5 7.5   Ring:         P1            6.2 7.4      Hand ROM. Measured in degrees.    5/10/2019     Right         Index: MP  0/50              PIP     0/90              DIP 0//68              DURAN 208         Long:  MP 0/50              PIP 33/93              DIP 0/65              DURAN 175         Ring:   MP 0/50              PIP 21/90              DIP 0/67              DURAN 186         Small:  MP 0/40               PIP 0/80               DIP 0/75              DURAN 195            Sensation: Intact      Strength (Dyanmometer) and Pinch Strength (Pinch Gauge)  Measured in pounds and psi. Average of three trials.    5/10/2019 5/10/2019     Left Right   Rung II 60 28    Mendieta Pinch 12 11   3pt Pinch 11 13   2pt Pinch 8 9      Palpation:  Positive for trigger finger with palpation of volar MP of   Long and ring finger      Trudi participated in dynamic functional therapeutic activities to improve functional performance for 45  minutes, including:  -Patient received fluidotherapy to right hand for 15 minutes to increase blood flow, circulation, desensitization, sensory re-education and for pain management.   -IASTYM to volar MP of long and ring on right hand  -AROM as follows: jt blocking, hook, wave, abd/add, finger ext x 10 reps each  -yellow putty - flowers 10 x  -digi-extend x 30 reps with yellow rb         Home Exercises and Education Provided     Education provided:   - yellow putty for flowers only  - Progress towards goals: Fair    Written Home Exercises Provided: Patient instructed to cont prior HEP.  Exercises were reviewed and Trudi was able to demonstrate them prior to the end of the session.  Trudi demonstrated good  understanding of the education provided.     See EMR under Patient Instructions for exercises provided prior visit.     Trudi demonstrated good  understanding of the education provided.         Assessment   Trudi Howard is a 53 y.o. female referred to outpatient occupational/hand therapy and presents with a medical diagnosis of right long ad ring finger, resulting in pain, edema, decreased range of motion, decreased  strength and decreased functional use of right hand.  Pt has been non-complaint with her HEP and splint wear. Pt instructed on importance of HEP and splint wear. Pt verbalized understanding. Advanced light strengthening ex for finger ext with no difficulty.     Pt would continue to benefit from skilled OT.      Trudi is progressing well towards her goals and there are no updates to goals at this time. Pt prognosis is Good.     Pt will continue to benefit from skilled outpatient occupational therapy to address the deficits listed in the problem  list on initial evaluation provide pt/family education and to maximize pt's level of independence in the home and community environment.     Anticipated barriers to occupational therapy: attention span    Pt's spiritual, cultural and educational needs considered and pt agreeable to plan of care and goals.    Goals:  Short Term (4 weeks on 6/10/2019):  1)   Patient to be IND with HEP and modalities for pain management. - progressing  2)   Increase ROM 10-15 degrees for PIP extension of right long and ring finger  to increase functional hand use for releasing objects.- progressing  3)   Increase  strength 5-10 lbs. to grasp containers.-progressing  4)   Decrease edema .2-.3 cm to increase joint mobility /flexibility for improved overall functional hand use. -progressing        Long Term (by discharge):  1)   Pt will report 0 out of 10 pain with right hand at rest.-progressing  2)   Patient to score at 48% or less on FOTO to demonstrate improved perception of functional right hand use.-progressing  3)   Pt will return to prior level of function for ADLs and household management. -progressing            Plan   Certification Period/Plan of care expiration: 5/10/2019 to 7/10/2019.     Outpatient Occupational Therapy 2 times weekly for 8 weeks may include the following interventions: Paraffin, Fluidotherapy, Manual therapy/joint mobilizations, Modalities for pain management, US 3 mhz, Therapeutic exercises/activities., Strengthening, Orthotic Fabrication/Fit/Training and Edema Control.       Discussed Plan of Care with patient: Yes  Updates/Grading for next session: Advance strengthening ex as tolerated.      Day Massey, OT

## 2019-06-04 ENCOUNTER — CLINICAL SUPPORT (OUTPATIENT)
Dept: REHABILITATION | Facility: HOSPITAL | Age: 54
End: 2019-06-04
Payer: MEDICAID

## 2019-06-04 DIAGNOSIS — M25.641 DECREASED RANGE OF MOTION OF FINGER OF RIGHT HAND: ICD-10-CM

## 2019-06-04 DIAGNOSIS — M79.641 PAIN IN RIGHT HAND: ICD-10-CM

## 2019-06-04 PROCEDURE — 97530 THERAPEUTIC ACTIVITIES: CPT

## 2019-06-04 NOTE — PATIENT INSTRUCTIONS
"[facility] - OCCUPATIONAL THERAPY  HOME EXERCISE PROGRAM         Complete the following exercises with 10 repetitions each, 4 x/day.     AROM: DIP Flexion / Extension  Pinch middle knuckle to prevent bending. Bend end knuckle until stretch is felt.   Hold 3 seconds. Relax. Straighten finger as far as possible.    AROM: PIP Flexion / Extension  Pinch bottom knuckle  to prevent bending. Actively bend middle knuckle until stretch is felt.   Hold 3 seconds. Relax. Straighten finger as far as possible.      AROM: Isolated MCP Flexion / Extension ("Wave")   Bend only your large, bottom knuckles. Hold 3 seconds. Keep the tips of your fingers straight. Straighten fingers.    AROM: Isolated IPJ Flexion / Extension ("Hook")  Bend only your middle and end knuckles. Hold 3 seconds.   Straighten your fingers.     AROM: MCP and PIP Flexion / Extension ("Straight Fist")  Bend your bottom and middle knuckles, keeping the tips of your fingers straight. Try to touch the pads of your fingers on your palm. Hold 3 seconds. Straighten your fingers.     AROM: Composite Flexion / Extension ("Full Fist")  Bend every joint in your hand into a fist. Hold 3 seconds. Straighten your fingers.     AROM: Composte Extension ("Finger Lifts")  Lift your finger off of the table one at a time. Hold 3 seconds. Relax your finger.    AROM: Abduction / Adduction  With hand flat on table, spread all fingers apart, then bring them together as close as possible.    Copyright © I. All rights reserved.     Therapist: MARTHA Del Rio CHT    "

## 2019-06-04 NOTE — PROGRESS NOTES
Occupational Therapy Daily Treatment Note     Date: 6/4/2019  Name: Trudi Howard  Clinic Number: [id number]    Therapy Diagnosis:   Encounter Diagnoses   Name Primary?    Decreased range of motion of finger of right hand     Pain in right hand      Physician: Kenneth Tang Jr., *  Physician Orders: Eval and treat  Other Comments: has had 2 steroid injections in her right hand  Medical Diagnosis:   M65.30 (ICD-10-CM) - Trigger finger, unspecified finger, unspecified laterality   Referral Notes   Number of Notes: 1      Surgical Procedure and Date: N/A  Evaluation Date: 5/10/2019  Insurance: Medicaid  Insurance Authorization period Expiration: 07/29/2019      Plan of Care Certification Period: 5/10/2019 to 7/10/2019     Visit # / Visits Authortized: 3/ 20  Time In:10:55 AM  Time Out: 11:35M  Total Time: 45 minutes  Total Treatment Time: 45 minutes  Charges: 3 TA     Precautions: Standard           Subjective     Pt reports: she was not compliant with home exercise program given last session. Pt reports that she lost her HEP paper and could not remember all of her exercises.  Response to previous treatment:No change  Functional change: none    Pain: 1/10  Location: right hand     Objective   Edema. Measured in centimeters.    5/10/2019 5/10/2019     Left Right   Long:         P1 6.5 7.5   Ring:         P1            6.2 7.4      Hand ROM. Measured in degrees.    5/10/2019     Right         Index: MP  0/50              PIP     0/90              DIP 0//68              DURAN 208         Long:  MP 0/50              PIP 33/93              DIP 0/65              DURAN 175         Ring:   MP 0/50              PIP 21/90              DIP 0/67              DURAN 186         Small:  MP 0/40               PIP 0/80               DIP 0/75              DURAN 195            Sensation: Intact      Strength (Dyanmometer) and Pinch Strength (Pinch Gauge)  Measured in pounds and psi. Average of three trials.     5/10/2019 5/10/2019     Left Right   Rung II 60 28   Key Pinch 12 11   3pt Pinch 11 13   2pt Pinch 8 9      Palpation:  Positive for trigger finger with palpation of volar MP of   Long and ring finger      Trudi participated in dynamic functional therapeutic activities to improve functional performance for 45  minutes, including:  -Patient received fluidotherapy to right hand for 15 minutes to increase blood flow, circulation, desensitization, sensory re-education and for pain management.   -IASTYM to volar MP of long and ring on right hand  -AROM as follows: jt blocking, hook, wave, abd/add, finger ext x 10 reps each  -red putty - flowers 10 x  -digi-extend x 30 reps with green rb  -towel scrunches x 10 reps         Home Exercises and Education Provided     Education provided:   - none  - Progress towards goals: Fair    Written Home Exercises Provided: Patient instructed to cont prior HEP.  Exercises were reviewed and Trudi was able to demonstrate them prior to the end of the session.  Trudi demonstrated good  understanding of the education provided.     See EMR under Patient Instructions for exercises provided prior visit.     Trudi demonstrated good  understanding of the education provided.         Assessment   Trudi Howard is a 53 y.o. female referred to outpatient occupational/hand therapy and presents with a medical diagnosis of right long ad ring finger, resulting in pain, edema, decreased range of motion, decreased  strength and decreased functional use of right hand.  Pt has lost her HEP paper and therefore was unable to remember all of her exercises. Pt was given a new copy and reminded of the importance of doing her HEP. Advanced light strengthening ex for fingers with no difficulty.     Pt would continue to benefit from skilled OT.      Trudi is progressing well towards her goals and there are no updates to goals at this time. Pt prognosis is Good.     Pt will continue to benefit from skilled outpatient  "occupational therapy to address the deficits listed in the problem list on initial evaluation provide pt/family education and to maximize pt's level of independence in the home and community environment.     Anticipated barriers to occupational therapy: attention span    Pt's spiritual, cultural and educational needs considered and pt agreeable to plan of care and goals.    Goals:  Short Term (4 weeks on 6/10/2019):  1)   Patient to be IND with HEP and modalities for pain management. - progressing  2)   Increase ROM 10-15 degrees for PIP extension of right long and ring finger  to increase functional hand use for releasing objects.- progressing  3)   Increase  strength 5-10 lbs. to grasp containers.-progressing  4)   Decrease edema .2-.3 cm to increase joint mobility /flexibility for improved overall functional hand use. -progressing        Long Term (by discharge):  1)   Pt will report 0 out of 10 pain with right hand at rest.-progressing  2)   Patient to score at 48% or less on FOTO to demonstrate improved perception of functional right hand use.-progressing  3)   Pt will return to prior level of function for ADLs and household management. -progressing            Plan   Certification Period/Plan of care expiration: 5/10/2019 to 7/10/2019.     Outpatient Occupational Therapy 2 times weekly for 8 weeks may include the following interventions: Paraffin, Fluidotherapy, Manual therapy/joint mobilizations, Modalities for pain management, US 3 mhz, Therapeutic exercises/activities., Strengthening, Orthotic Fabrication/Fit/Training and Edema Control.       Discussed Plan of Care with patient: Yes  Updates/Grading for next session: Advance strengthening ex as tolerated.    Destiny BASHIR    "I certify that I was present in the room directing the student in service delivery and guiding them using my skilled judgement. As the co-signing therapist, I have reviewed the student's documentation and am responsible for " "the treatment, assessment and plan."    Therapist: MARTHA Del Rio CHT  Occupational Therapist  Certified Hand Therapist  Day Massey, OT   "

## 2019-06-13 ENCOUNTER — CLINICAL SUPPORT (OUTPATIENT)
Dept: REHABILITATION | Facility: HOSPITAL | Age: 54
End: 2019-06-13
Payer: MEDICAID

## 2019-06-13 DIAGNOSIS — M79.641 PAIN IN RIGHT HAND: ICD-10-CM

## 2019-06-13 DIAGNOSIS — M25.641 DECREASED RANGE OF MOTION OF FINGER OF RIGHT HAND: ICD-10-CM

## 2019-06-13 PROCEDURE — 97530 THERAPEUTIC ACTIVITIES: CPT

## 2019-06-13 NOTE — PROGRESS NOTES
Occupational Therapy Daily Treatment Note     Date: 6/13/2019  Name: Trudi Howard  Clinic Number: [id number]    Therapy Diagnosis:   Encounter Diagnoses   Name Primary?    Decreased range of motion of finger of right hand     Pain in right hand      Physician: Kenneth Tang Jr., *  Physician Orders: Eval and treat  Other Comments: has had 2 steroid injections in her right hand  Medical Diagnosis:   M65.30 (ICD-10-CM) - Trigger finger, unspecified finger, unspecified laterality   Referral Notes   Number of Notes: 1      Surgical Procedure and Date: N/A  Evaluation Date: 5/10/2019  Insurance: Medicaid  Insurance Authorization period Expiration: 07/29/2019      Plan of Care Certification Period: 5/10/2019 to 7/10/2019     Visit # / Visits Authortized: 4/ 20  Time In: 10:15 AM  Time Out: 11:05 AM  Total Time: 50 minutes  Total Treatment Time: 50 minutes  Charges: 3 TA     Precautions: Standard           Subjective     Pt reports: she was not compliant with home exercise program given last session. Pt reports that she lost her HEP paper again and could not remember all of her exercises except for two.  Response to previous treatment: Pt reports no pain in her hand today.   Functional change: Pt reports that it is getting easier to use utensils during meals.     Pain: 0/10  Location: right hand     Objective   Edema. Measured in centimeters.    5/10/2019 5/10/2019     Left Right   Long:         P1 6.5 7.5   Ring:         P1            6.2 7.4      Hand ROM. Measured in degrees.    5/10/2019     Right         Index: MP  0/50              PIP     0/90              DIP 0//68              DURAN 208         Long:  MP 0/50              PIP 33/93              DIP 0/65              DURAN 175         Ring:   MP 0/50              PIP 21/90              DIP 0/67              DURAN 186         Small:  MP 0/40               PIP 0/80               DIP 0/75              DURAN 195            Sensation: Intact       Strength (Dyanmometer) and Pinch Strength (Pinch Gauge)  Measured in pounds and psi. Average of three trials.    5/10/2019 5/10/2019     Left Right   Rung II 60 28   Key Pinch 12 11   3pt Pinch 11 13   2pt Pinch 8 9      Palpation:  Positive for trigger finger with palpation of volar MP of   Long and ring finger      Trudi participated in dynamic functional therapeutic activities to improve functional performance for 45  minutes, including:  -Patient received fluidotherapy to right hand for 15 minutes to increase blood flow, circulation, desensitization, sensory re-education and for pain management.   -IASTYM to volar MP of long and ring on right hand  -AROM as follows: jt blocking, hook, wave, abd/add, finger ext x 10 reps each  -red putty - flowers 10 x  -digi-extend x 30 reps with green rb  -towel scrunches x 10 reps         Home Exercises and Education Provided     Education provided:   - none  - Progress towards goals: Fair    Written Home Exercises Provided: Patient instructed to cont prior HEP.  Exercises were reviewed and Trudi was able to demonstrate them prior to the end of the session.  Trudi demonstrated good  understanding of the education provided.     See EMR under Patient Instructions for exercises provided prior visit.     Trudi demonstrated good  understanding of the education provided.         Assessment   Trudi Howard is a 53 y.o. female referred to outpatient occupational/hand therapy and presents with a medical diagnosis of right long ad ring finger, resulting in pain, edema, decreased range of motion, decreased  strength and decreased functional use of right hand.  Pt has lost her HEP paper and therefore was unable to remember all of her exercises. Pt was given a new copy and reminded of the importance of doing her HEP. Continued light strengthening ex for fingers with no difficulty.     Pt would continue to benefit from skilled OT.      Trudi is progressing well towards her goals and  "there are no updates to goals at this time. Pt prognosis is Good.     Pt will continue to benefit from skilled outpatient occupational therapy to address the deficits listed in the problem list on initial evaluation provide pt/family education and to maximize pt's level of independence in the home and community environment.     Anticipated barriers to occupational therapy: attention span    Pt's spiritual, cultural and educational needs considered and pt agreeable to plan of care and goals.    Goals:  Short Term (4 weeks on 6/10/2019):  1)   Patient to be IND with HEP and modalities for pain management. - progressing  2)   Increase ROM 10-15 degrees for PIP extension of right long and ring finger  to increase functional hand use for releasing objects.- progressing  3)   Increase  strength 5-10 lbs. to grasp containers.-progressing  4)   Decrease edema .2-.3 cm to increase joint mobility /flexibility for improved overall functional hand use. -progressing        Long Term (by discharge):  1)   Pt will report 0 out of 10 pain with right hand at rest.-progressing  2)   Patient to score at 48% or less on FOTO to demonstrate improved perception of functional right hand use.-progressing  3)   Pt will return to prior level of function for ADLs and household management. -progressing            Plan: Measure next session.   Certification Period/Plan of care expiration: 5/10/2019 to 7/10/2019.     Outpatient Occupational Therapy 2 times weekly for 8 weeks may include the following interventions: Paraffin, Fluidotherapy, Manual therapy/joint mobilizations, Modalities for pain management, US 3 mhz, Therapeutic exercises/activities., Strengthening, Orthotic Fabrication/Fit/Training and Edema Control.       Discussed Plan of Care with patient: Yes  Updates/Grading for next session: Advance strengthening ex as tolerated.    Destiny BASHIR    "I certify that I was present in the room directing the student in service " "delivery and guiding them using my skilled judgement. As the co-signing therapist, I have reviewed the student's documentation and am responsible for the treatment, assessment and plan."    Therapist: MARTHA Del Rio CHT  Occupational Therapist  Certified Hand Therapist  Day Massey, OT   "

## 2019-06-28 ENCOUNTER — CLINICAL SUPPORT (OUTPATIENT)
Dept: REHABILITATION | Facility: HOSPITAL | Age: 54
End: 2019-06-28
Payer: MEDICAID

## 2019-06-28 DIAGNOSIS — M79.641 PAIN IN RIGHT HAND: ICD-10-CM

## 2019-06-28 DIAGNOSIS — M25.641 DECREASED RANGE OF MOTION OF FINGER OF RIGHT HAND: ICD-10-CM

## 2019-06-28 PROCEDURE — 97530 THERAPEUTIC ACTIVITIES: CPT

## 2019-06-28 NOTE — PROGRESS NOTES
Occupational Therapy Daily Treatment Note     Date: 6/28/2019  Name: Trudi Howard  Clinic Number: [id number]    Therapy Diagnosis:   No diagnosis found.  Physician: Kenneth Tang Jr., *  Physician Orders: Eval and treat  Other Comments: has had 2 steroid injections in her right hand  Medical Diagnosis:   M65.30 (ICD-10-CM) - Trigger finger, unspecified finger, unspecified laterality   Referral Notes   Number of Notes: 1      Surgical Procedure and Date: N/A  Evaluation Date: 5/10/2019  Insurance: Medicaid  Insurance Authorization period Expiration: 07/29/2019      Plan of Care Certification Period: 5/10/2019 to 7/10/2019     Visit # / Visits Authortized: 5/ 20  Time In: 10:40 AM  Time Out: 11:30 AM  Total Time: 50 minutes  Total Treatment Time: 50 minutes  Charges: 3 TA     Precautions: Standard           Subjective     Pt reports: she was not compliant with home exercise program given last session. Pt reports that she missed her last visit because she overslept.  Response to previous treatment: Pt reports increased pain in her ring finger from wearing her finger orthotic for 30 minutes.   Functional change: none     Pain: 2/10  Location: right hand     Objective   Edema. Measured in centimeters.    5/10/2019 5/10/2019     Left Right   Long:         P1 6.5 7.5   Ring:         P1            6.2 7.4      Hand ROM. Measured in degrees.    5/10/2019     Right         Index: MP  0/50              PIP     0/90              DIP 0//68              DURAN 208         Long:  MP 0/50              PIP 33/93              DIP 0/65              DURAN 175         Ring:   MP 0/50              PIP 21/90              DIP 0/67              DURAN 186         Small:  MP 0/40               PIP 0/80               DIP 0/75              DURAN 195            Sensation: Intact      Strength (Dyanmometer) and Pinch Strength (Pinch Gauge)  Measured in pounds and psi. Average of three trials.    5/10/2019 5/10/2019      Left Right   Rung II 60 28   Key Pinch 12 11   3pt Pinch 11 13   2pt Pinch 8 9      Palpation:  Positive for trigger finger with palpation of volar MP of   Long and ring finger      CMS Impairment/Limitation/Restriction for FOTO Hand Survey    Therapist reviewed FOTO scores for Trudi Howard on 6/28/2019.   FOTO documents entered into [employer] - see Media section.    Limitation Score: 61%  Category: Carrying    Current : CL = least 60% but < 80% impaired, limited or restricted  Goal: CK = at least 40% but < 60% impaired, limited or restricted           Trudi participated in dynamic functional therapeutic activities to improve functional performance for 50  minutes, including:  -Patient received fluidotherapy to right hand for 15 minutes to increase blood flow, circulation, desensitization, sensory re-education and for pain management.   -IASTYM to volar MP of long and ring on right hand  -AROM as follows: jt blocking, hook, wave, abd/add, finger ext x 10 reps each  -red putty - flowers 10 x  -digi-extend x 30 reps with green rb  -towel scrunches x 10 reps         Home Exercises and Education Provided     Education provided:   - Importance of doing HEP   - Progress towards goals: Fair    Written Home Exercises Provided: Patient instructed to cont prior HEP.  Exercises were reviewed and Trudi was able to demonstrate them prior to the end of the session.  Trudi demonstrated good  understanding of the education provided.     See EMR under Patient Instructions for exercises provided prior visit.     Trudi demonstrated good  understanding of the education provided.         Assessment   Trudi Howard is a 53 y.o. female referred to outpatient occupational/hand therapy and presents with a medical diagnosis of right long ad ring finger, resulting in pain, edema, decreased range of motion, decreased  strength and decreased functional use of right hand. Pt reports that she missed her last visit because she overslept. Pt reports  increased pain in her ring finger from wearing her finger orthotic for 30 minutes. Pt was educated on the importance of doing her HEP. Pt verbalized that she understood. Pt's Foto score has improved by 13 points. Continued light strengthening ex for fingers with no difficulty.     Pt would continue to benefit from skilled OT.      Trudi is progressing well towards her goals and there are no updates to goals at this time. Pt prognosis is Good.     Pt will continue to benefit from skilled outpatient occupational therapy to address the deficits listed in the problem list on initial evaluation provide pt/family education and to maximize pt's level of independence in the home and community environment.     Anticipated barriers to occupational therapy: attention span    Pt's spiritual, cultural and educational needs considered and pt agreeable to plan of care and goals.    Goals:  Short Term (4 weeks on 6/10/2019):  1)   Patient to be IND with HEP and modalities for pain management. - progressing  2)   Increase ROM 10-15 degrees for PIP extension of right long and ring finger  to increase functional hand use for releasing objects.- progressing  3)   Increase  strength 5-10 lbs. to grasp containers.-progressing  4)   Decrease edema .2-.3 cm to increase joint mobility /flexibility for improved overall functional hand use. -progressing        Long Term (by discharge):  1)   Pt will report 0 out of 10 pain with right hand at rest.-progressing  2)   Patient to score at 48% or less on FOTO to demonstrate improved perception of functional right hand use.-progressing  3)   Pt will return to prior level of function for ADLs and household management. -progressing            Plan: Measure next session.   Certification Period/Plan of care expiration: 5/10/2019 to 7/10/2019.     Outpatient Occupational Therapy 2 times weekly for 8 weeks may include the following interventions: Paraffin, Fluidotherapy, Manual therapy/joint  "mobilizations, Modalities for pain management, US 3 mhz, Therapeutic exercises/activities., Strengthening, Orthotic Fabrication/Fit/Training and Edema Control.       Discussed Plan of Care with patient: Yes  Updates/Grading for next session: none    Destiny BASHIR    "I certify that I was present in the room directing the student in service delivery and guiding them using my skilled judgement. As the co-signing therapist, I have reviewed the student's documentation and am responsible for the treatment, assessment and plan."    Therapist: MARTHA Del Rio, YUNIOR  Occupational Therapist  Certified Hand Therapist  Day Massey, OT   "

## 2019-07-01 ENCOUNTER — CLINICAL SUPPORT (OUTPATIENT)
Dept: REHABILITATION | Facility: HOSPITAL | Age: 54
End: 2019-07-01
Payer: MEDICAID

## 2019-07-01 DIAGNOSIS — M79.641 PAIN IN RIGHT HAND: ICD-10-CM

## 2019-07-01 DIAGNOSIS — M25.641 DECREASED RANGE OF MOTION OF FINGER OF RIGHT HAND: ICD-10-CM

## 2019-07-01 PROCEDURE — 97530 THERAPEUTIC ACTIVITIES: CPT

## 2019-07-08 ENCOUNTER — TELEPHONE (OUTPATIENT)
Dept: ORTHOPEDICS | Facility: CLINIC | Age: 54
End: 2019-07-08

## 2019-07-08 ENCOUNTER — CLINICAL SUPPORT (OUTPATIENT)
Dept: REHABILITATION | Facility: HOSPITAL | Age: 54
End: 2019-07-08
Payer: MEDICAID

## 2019-07-08 DIAGNOSIS — M79.641 PAIN IN RIGHT HAND: ICD-10-CM

## 2019-07-08 DIAGNOSIS — M25.641 DECREASED RANGE OF MOTION OF FINGER OF RIGHT HAND: ICD-10-CM

## 2019-07-08 PROCEDURE — 97530 THERAPEUTIC ACTIVITIES: CPT

## 2019-07-08 NOTE — TELEPHONE ENCOUNTER
Left message for patient to call office back .  ----- Message from Haily López sent at 7/8/2019 11:33 AM CDT -----  Contact: [phone]/self  Type:  Sooner Apoointment Request    Caller is requesting a sooner appointment.  Caller declined first available appointment listed below.  Caller will not accept being placed on the waitlist and is requesting a message be sent to doctor.  Name of Caller:  self  When is the first available appointment? None  Symptoms: Trigger finger  Would the patient rather a call back or a response via [facility]?  Self  Best Call Back Number:  Additional Information: Only Dr. Tang. She is also requesting more (12) OT visits be added. Thanks

## 2019-07-08 NOTE — PROGRESS NOTES
Occupational Therapy Daily Treatment Note     Date: 7/8/2019  Name: Trudi Howard  Clinic Number: [id number]    Therapy Diagnosis:   Encounter Diagnoses   Name Primary?    Decreased range of motion of finger of right hand     Pain in right hand      Physician: Kenneth Tang Jr., *  Physician Orders: Eval and treat  Other Comments: has had 2 steroid injections in her right hand  Medical Diagnosis:   M65.30 (ICD-10-CM) - Trigger finger, unspecified finger, unspecified laterality   Referral Notes   Number of Notes: 1      Surgical Procedure and Date: N/A  Evaluation Date: 5/10/2019  Insurance: Medicaid  Insurance Authorization period Expiration: 07/29/2019      Plan of Care Certification Period: 5/10/2019 to 7/10/2019     Visit # / Visits Authortized: 7/ 20  Time In: 11:05 AM  Time Out: 11:50 AM  Total Time: 45 minutes  Total Treatment Time: 45 minutes  Charges: 3 TA     Precautions: Standard           Subjective     Pt reports: she was not compliant with home exercise program given last session.   Response to previous treatment: Pt reports she has no pain today.    Functional change: Pt reports she was able to use her right hand to assist herself up from the ground.     Pain: 0/10  Location: right hand     Objective       Edema. Measured in centimeters.    5/10/2019 5/10/2019 7/1/2019     Left Right Right   Long:          P1 6.5 7.5 7.1   Ring:          P1          6.2 7.4 7.2      Hand ROM. Measured in degrees.    5/10/2019 7/1/2019     Right Right          Index: MP  0/50 0/65              PIP     0/90 0/90              DIP 0//68 0/68              DURAN 208 223          Long:  MP 0/50 0/60              PIP 33/93 26/93              DIP 0/65 0/65              DURAN 175 192          Ring:   MP 0/50 0/54              PIP 21/90 19/90              DIP 0/67 0/70              DURAN 186 195          Small:  MP 0/40 0/43               PIP 0/80 0/84               DIP 0/75 0/75              DURAN 195 202              Sensation: Intact      Strength (Dyanmometer) and Pinch Strength (Pinch Gauge)  Measured in pounds and psi. Average of three trials.    5/10/2019 5/10/2019 7/1/2019     Left Right Right   Rung II 60 28 28   Mendieta Pinch 12 11 11   3pt Pinch 11 13 12   2pt Pinch 8 9 9      Palpation:  Positive for trigger finger with palpation of volar MP of   Long and ring finger    Trudi participated in dynamic functional therapeutic activities to improve functional performance for 50 minutes, including:   -Patient received fluidotherapy to right hand for 15 minutes to increase blood flow, circulation, desensitization, sensory re-education and for pain management   -IASTYM to volar MP of long and ring on right hand  -AROM as follows: jt blocking, hook, wave, abd/add, finger ext x 10 reps each  -red putty - flowers 5 x  -digi-extend x 30 reps with green rb  -towel scrunches x 10 reps     Home Exercises and Education Provided     Education provided:   - None today  - Progress towards goals: Fair    Written Home Exercises Provided: Patient instructed to cont prior HEP.  Exercises were reviewed and Trudi was able to demonstrate them prior to the end of the session.  Trudi demonstrated good  understanding of the education provided.     See EMR under Patient Instructions for exercises provided prior visit.     Trudi demonstrated good  understanding of the education provided.         Assessment   Trudi Howard is a 53 y.o. female referred to outpatient occupational/hand therapy and presents with a medical diagnosis of right long ad ring finger, resulting in pain, edema, decreased range of motion, decreased  strength and decreased functional use of right hand. Pt reports she was able to use her right hand to assist herself up from the ground. Pt reports no pain today. Continued light strengthening ex for fingers with no difficulty.     Pt would continue to benefit from skilled OT.      Trudi is progressing well towards her  "goals and there are no updates to goals at this time. Pt prognosis is fair.     Pt will continue to benefit from skilled outpatient occupational therapy to address the deficits listed in the problem list on initial evaluation provide pt/family education and to maximize pt's level of independence in the home and community environment.     Anticipated barriers to occupational therapy: attention span    Pt's spiritual, cultural and educational needs considered and pt agreeable to plan of care and goals.    Goals:  Short Term (4 weeks on 6/10/2019):  1)   Patient to be IND with HEP and modalities for pain management. - progressing  2)   Increase ROM 10-15 degrees for PIP extension of right long and ring finger  to increase functional hand use for releasing objects.- progressing  3)   Increase  strength 5-10 lbs. to grasp containers.-progressing  4)   Decrease edema .2-.3 cm to increase joint mobility /flexibility for improved overall functional hand use. - Met 7/1/2019        Long Term (by discharge):  1)   Pt will report 0 out of 10 pain with right hand at rest.-progressing  2)   Patient to score at 48% or less on FOTO to demonstrate improved perception of functional right hand use.-progressing  3)   Pt will return to prior level of function for ADLs and household management. -progressing            Plan: Re-eval due to POC expiration    Certification Period/Plan of care expiration: 5/10/2019 to 7/10/2019.     Outpatient Occupational Therapy 2 times weekly for 8 weeks may include the following interventions: Paraffin, Fluidotherapy, Manual therapy/joint mobilizations, Modalities for pain management, US 3 mhz, Therapeutic exercises/activities., Strengthening, Orthotic Fabrication/Fit/Training and Edema Control.       Discussed Plan of Care with patient: Yes  Updates/Grading for next session: none    Destiny BASHIR    "I certify that I was present in the room directing the student in service delivery and " "guiding them using my skilled judgement. As the co-signing therapist, I have reviewed the student's documentation and am responsible for the treatment, assessment and plan."    Therapist: MARTHA Del Rio CHT  Occupational Therapist  Certified Hand Therapist  Day Massey, OT   "

## 2019-08-09 ENCOUNTER — TELEPHONE (OUTPATIENT)
Dept: OBSTETRICS AND GYNECOLOGY | Facility: CLINIC | Age: 54
End: 2019-08-09

## 2019-08-09 NOTE — TELEPHONE ENCOUNTER
----- Message from Talya Kaur sent at 8/9/2019  3:48 PM CDT -----  Contact: [phone]/self  Patient is returning your call   Please advise

## 2019-08-09 NOTE — TELEPHONE ENCOUNTER
----- Message from Talya Kaur sent at 8/9/2019 12:05 PM CDT -----  Contact: [phone]/SELF  Type:  Sooner Apoointment Request    Caller is requesting a sooner appointment.  Caller declined first available appointment listed below.  Caller will not accept being placed on the waitlist and is requesting a message be sent to doctor.  Name of Caller:PATIENT  When is the first available appointment? 8-  Symptoms:VAGINAL ISSUES   Would the patient rather a call back or a response via [facility]? CALLBACK  Best Call Back Number:[phone]  Additional Information: PATIENT REQUESTING TO BE SEEN ANY DAY FOR 11AM

## 2019-08-09 NOTE — TELEPHONE ENCOUNTER
Pt state she feel like she have a prolapse and is very itchy. pt wants to know if she can be seen sooner.

## 2019-08-12 ENCOUNTER — TELEPHONE (OUTPATIENT)
Dept: OBSTETRICS AND GYNECOLOGY | Facility: CLINIC | Age: 54
End: 2019-08-12

## 2019-08-12 NOTE — TELEPHONE ENCOUNTER
----- Message from Carolyn Reilly sent at 8/12/2019  9:06 AM CDT -----  Contact: self, [phone] (M)  Patient called in returning your call. Please advise.

## 2019-08-12 NOTE — TELEPHONE ENCOUNTER
You sent a message stating she can come in for Friday at 1. Pt state she have to pick her daughter up around that time

## 2019-08-16 ENCOUNTER — OFFICE VISIT (OUTPATIENT)
Dept: OBSTETRICS AND GYNECOLOGY | Facility: CLINIC | Age: 54
End: 2019-08-16
Payer: MEDICAID

## 2019-08-16 VITALS
HEIGHT: 69 IN | BODY MASS INDEX: 39.27 KG/M2 | DIASTOLIC BLOOD PRESSURE: 76 MMHG | WEIGHT: 265.13 LBS | SYSTOLIC BLOOD PRESSURE: 128 MMHG

## 2019-08-16 DIAGNOSIS — Z12.4 ROUTINE PAPANICOLAOU SMEAR: Primary | ICD-10-CM

## 2019-08-16 DIAGNOSIS — Z01.419 WELL WOMAN EXAM WITH ROUTINE GYNECOLOGICAL EXAM: ICD-10-CM

## 2019-08-16 PROCEDURE — 87624 HPV HI-RISK TYP POOLED RSLT: CPT

## 2019-08-16 PROCEDURE — 99396 PR PREVENTIVE VISIT,EST,40-64: ICD-10-PCS | Mod: S$PBB,,, | Performed by: OBSTETRICS & GYNECOLOGY

## 2019-08-16 PROCEDURE — 99396 PREV VISIT EST AGE 40-64: CPT | Mod: S$PBB,,, | Performed by: OBSTETRICS & GYNECOLOGY

## 2019-08-16 PROCEDURE — 99999 PR PBB SHADOW E&M-EST. PATIENT-LVL III: CPT | Mod: PBBFAC,,, | Performed by: OBSTETRICS & GYNECOLOGY

## 2019-08-16 PROCEDURE — 99213 OFFICE O/P EST LOW 20 MIN: CPT | Mod: PBBFAC,PO | Performed by: OBSTETRICS & GYNECOLOGY

## 2019-08-16 PROCEDURE — 88175 CYTOPATH C/V AUTO FLUID REDO: CPT | Performed by: PATHOLOGY

## 2019-08-16 PROCEDURE — 88141 LIQUID-BASED PAP SMEAR, SCREENING: ICD-10-PCS | Mod: ,,, | Performed by: PATHOLOGY

## 2019-08-16 PROCEDURE — 88141 CYTOPATH C/V INTERPRET: CPT | Mod: ,,, | Performed by: PATHOLOGY

## 2019-08-16 PROCEDURE — 99999 PR PBB SHADOW E&M-EST. PATIENT-LVL III: ICD-10-PCS | Mod: PBBFAC,,, | Performed by: OBSTETRICS & GYNECOLOGY

## 2019-08-16 RX ORDER — FLUCONAZOLE 100 MG/1
100 TABLET ORAL DAILY
Qty: 3 TABLET | Refills: 0 | Status: SHIPPED | OUTPATIENT
Start: 2019-08-16 | End: 2019-08-19

## 2019-08-16 NOTE — PROGRESS NOTES
"HPI:   53 y.o.   OB History        3    Para   3    Term   3            AB        Living   3       SAB        TAB        Ectopic        Multiple        Live Births                  No LMP recorded. Patient is premenopausal.    Patient is  here for her annual gynecologic exam.  She has no complaints.     ROS:  GENERAL: No fever, chills, fatigability or weight loss.  SKIN: No rashes, itching or changes in color or texture of skin.  HEAD: No headaches or recent head trauma.  EYES: Visual acuity fine. No photophobia, ocular pain or diplopia.  EARS: Denies ear pain, discharge or vertigo.  NOSE: No loss of smell, no epistaxis or postnasal drip.  MOUTH & THROAT: No hoarseness or change in voice. No excessive gum bleeding.  NODES: Denies swollen glands.  CHEST: Denies MARTINEZ, cyanosis, wheezing, cough and sputum production.  CARDIOVASCULAR: Denies chest pain, PND, orthopnea or reduced exercise tolerance.  ABDOMEN: Appetite fine. No weight loss. Denies diarrhea, abdominal pain, hematemesis or blood in stool.  URINARY: No flank pain, dysuria or hematuria.  PERIPHERAL VASCULAR: No claudication or cyanosis.  MUSCULOSKELETAL: No joint stiffness or swelling. Denies back pain.  NEUROLOGIC: No history of seizures, paralysis, alteration of gait or coordination.    PE:   /76   Ht 5' 9" (1.753 m)   Wt 120.2 kg (265 lb 1.7 oz)   BMI 39.15 kg/m²   APPEARANCE: Well nourished, well developed, in no acute distress.  NECK: Neck symmetric without masses or thyromegaly.  BREASTS: Symmetrical, no skin changes or visible lesions. No palpable masses, nipple discharge or adenopathy bilaterally.  ABDOMEN: Flat. Soft. No tenderness or masses. No hepatosplenomegaly. No hernias. No CVA tenderness.  VULVA: No lesions. Normal female genitalia.  URETHRAL MEATUS: Normal size and location, no lesions, no prolapse.  URETHRA: No masses, tenderness, prolapse or scarring.  VAGINA: Moist and well rugated, no discharge, no significant " cystocele or rectocele.  CERVIX: No lesions and discharge. PAP done.  UTERUS: Normal size, regular shape, mobile, non-tender, bladder base nontender.  ADNEXA: No masses, tenderness or CDS nodularity.  ANUS PERINEUM: Normal.    PROCEDURES:  Pap smear    Assessment:  Normal Gynecologic Exam    Plan:  Mammogram and Colonoscopy if indicated by current recommendations.  Return to clinic in one year or for any problems or complaints.  Pt feels has prolapse  Something blockin vag canal  Exam normal  Poss wt gain along with enlarged labia?  Diflucan empric for yeast symptoms  Fu prn

## 2019-08-27 ENCOUNTER — TELEPHONE (OUTPATIENT)
Dept: OBSTETRICS AND GYNECOLOGY | Facility: CLINIC | Age: 54
End: 2019-08-27

## 2019-09-03 LAB
HPV HR 12 DNA CVX QL NAA+PROBE: NEGATIVE
HPV16 AG SPEC QL: NEGATIVE
HPV18 DNA SPEC QL NAA+PROBE: NEGATIVE

## 2019-09-24 ENCOUNTER — TELEPHONE (OUTPATIENT)
Dept: OBSTETRICS AND GYNECOLOGY | Facility: CLINIC | Age: 54
End: 2019-09-24

## 2019-09-24 NOTE — TELEPHONE ENCOUNTER
----- Message from Brigette Ann sent at 9/24/2019  4:06 PM CDT -----  Contact: Trudi Howard  [phone]    Patient is calling regarding her pap smear test results. Patient is upset she received a certified letter.

## 2019-09-25 NOTE — TELEPHONE ENCOUNTER
Pt notified pap was atypical and would need to repeat it in 5-6 months. Pt state she will call back in dec. To schedule

## 2019-09-25 NOTE — TELEPHONE ENCOUNTER
----- Message from Hermila Pedraza sent at 9/25/2019  8:05 AM CDT -----  Contact: [phone]/ Self  Patient would like a call back regarding test results. Please call.

## 2019-10-17 ENCOUNTER — OFFICE VISIT (OUTPATIENT)
Dept: ORTHOPEDICS | Facility: CLINIC | Age: 54
End: 2019-10-17
Payer: MEDICAID

## 2019-10-17 ENCOUNTER — HOSPITAL ENCOUNTER (OUTPATIENT)
Dept: RADIOLOGY | Facility: HOSPITAL | Age: 54
Discharge: HOME | End: 2019-10-17
Attending: ORTHOPAEDIC SURGERY
Payer: MEDICAID

## 2019-10-17 DIAGNOSIS — M25.641 DECREASED RANGE OF MOTION OF FINGER OF RIGHT HAND: ICD-10-CM

## 2019-10-17 DIAGNOSIS — M79.641 PAIN OF RIGHT HAND: Primary | ICD-10-CM

## 2019-10-17 DIAGNOSIS — M65.331 TRIGGER MIDDLE FINGER OF RIGHT HAND: ICD-10-CM

## 2019-10-17 DIAGNOSIS — M79.641 PAIN OF RIGHT HAND: ICD-10-CM

## 2019-10-17 PROCEDURE — 99214 PR OFFICE/OUTPT VISIT, EST, LEVL IV, 30-39 MIN: ICD-10-PCS | Mod: S$PBB,,, | Performed by: ORTHOPAEDIC SURGERY

## 2019-10-17 PROCEDURE — 73120 XR HAND 2 VIEW RIGHT: ICD-10-PCS | Mod: 26,RT,, | Performed by: RADIOLOGY

## 2019-10-17 PROCEDURE — 99213 OFFICE O/P EST LOW 20 MIN: CPT | Mod: PBBFAC,25,PN | Performed by: ORTHOPAEDIC SURGERY

## 2019-10-17 PROCEDURE — 99999 PR PBB SHADOW E&M-EST. PATIENT-LVL III: CPT | Mod: PBBFAC,,, | Performed by: ORTHOPAEDIC SURGERY

## 2019-10-17 PROCEDURE — 99214 OFFICE O/P EST MOD 30 MIN: CPT | Mod: S$PBB,,, | Performed by: ORTHOPAEDIC SURGERY

## 2019-10-17 PROCEDURE — 99999 PR PBB SHADOW E&M-EST. PATIENT-LVL III: ICD-10-PCS | Mod: PBBFAC,,, | Performed by: ORTHOPAEDIC SURGERY

## 2019-10-17 PROCEDURE — 73120 X-RAY EXAM OF HAND: CPT | Mod: 26,RT,, | Performed by: RADIOLOGY

## 2019-10-17 PROCEDURE — 73120 X-RAY EXAM OF HAND: CPT | Mod: TC,PN,RT

## 2019-10-17 NOTE — PROGRESS NOTES
CC:  Trigger finger right middle and ring        HPI:  Trudi Howard is a very pleasant 53 y.o. female with ongoing symptoms right hand related to chronic triggering of the right middle and ring fingers  She does have slight flexion contractures we for which we worked on in the past but have been persistent  She would like try surgery for trigger release right middle and ring         PAST MEDICAL HISTORY:   Past Medical History:   Diagnosis Date    History of uterine fibroid     Intra-abdominal abscess 2017    Urinary retention, ruptured bladder, infected urinoma, sepsis    Morbid obesity     RUSSELL (obstructive sleep apnea)     Ovarian cyst 2017    Seizures      PAST SURGICAL HISTORY:   Past Surgical History:   Procedure Laterality Date    BREAST SURGERY      BUNIONECTOMY       SECTION      Exploratory Laparotomy w/ Abscess Drainage  2017        fibroidectomy      INCISIONAL HERNIA REPAIR  2019    Laparoscopic-      FAMILY HISTORY:   Family History   Adopted: Yes   Problem Relation Age of Onset    Heart disease Mother      SOCIAL HISTORY:   Social History     Socioeconomic History    Marital status: Legally      Spouse name: Not on file    Number of children: Not on file    Years of education: Not on file    Highest education level: Not on file   Occupational History    Not on file   Social Needs    Financial resource strain: Not on file    Food insecurity:     Worry: Not on file     Inability: Not on file    Transportation needs:     Medical: Not on file     Non-medical: Not on file   Tobacco Use    Smoking status: Never Smoker    Smokeless tobacco: Never Used   Substance and Sexual Activity    Alcohol use: Yes     Comment: seldom    Drug use: No    Sexual activity: Yes     Partners: Male     Birth control/protection: None     Comment:    Lifestyle    Physical activity:     Days per week: Not on file     Minutes per session: Not on  file    Stress: Not on file   Relationships    Social connections:     Talks on phone: Not on file     Gets together: Not on file     Attends [employer] service: Not on file     Active member of club or organization: Not on file     Attends meetings of clubs or organizations: Not on file     Relationship status: Not on file   Other Topics Concern    Not on file   Social History Narrative    Not on file       MEDICATIONS:   Current Outpatient Medications:     acetaZOLAMIDE (DIAMOX) 250 MG tablet, , Disp: , Rfl: 4    albuterol (VENTOLIN HFA) 90 mcg/actuation inhaler, Inhale 2 puffs into the lungs every 6 (six) hours as needed for Wheezing. Rescue, Disp: 18 g, Rfl: 0    cetirizine (ZYRTEC) 10 MG tablet, Take 1 tablet (10 mg total) by mouth once daily., Disp: 30 tablet, Rfl: 2    ethosuximide (ZARONTIN) 250 mg capsule, Take 250 mg by mouth 2 (two) times daily.  , Disp: , Rfl:     phenobarbital (LUMINAL) 32.4 MG tablet, 3 QAM AND 5 TS HS, Disp: , Rfl: 5  ALLERGIES:   Review of patient's allergies indicates:   Allergen Reactions    Aspirin      Contraindicated with other meds    Amoxicillin      rash    Milk containing products     Peanut        Review of Systems:  Constitutional: no fever or chills  ENT: no nasal congestion or sore throat  Respiratory: no cough or shortness of breath  Cardiovascular: no chest pain or palpitations  Gastrointestinal: no nausea or vomiting, PUD, GERD, NSAID intolerance  Genitourinary: no hematuria or dysuria  Integument/Breast: no rash or pruritis  Hematologic/Lymphatic: no easy bruising or lymphadenopathy  Musculoskeletal: see HPI  Neurological: no seizures or tremors  Behavioral/Psych: no auditory or visual hallucinations      Physical Exam   Vitals:    10/17/19 1528   PainSc:   1       Constitutional: Oriented to person, place, and time. Appears well-developed and well-nourished.   HENT:   Head: Normocephalic and atraumatic.   Nose: Nose normal.   Eyes: No scleral icterus.  "  Neck: Normal range of motion.   Cardiovascular: Normal rate and regular rhythm.    Pulses:       Radial pulses are 2+ on the right side, and 2+ on the left side.   Pulmonary/Chest: Effort normal and breath sounds normal.   Abdominal: Soft.   Neurological: Alert and oriented to person, place, and time.   Skin: Skin is warm.   Psychiatric: Normal mood and affect.     MUSCULOSKELETAL UPPER EXTREMITY:  Examination right hand there is slight flexion contracture of the PIP joint of the right middle and ring fingers about 20°  She has limited flexion secondary to pain and mild triggering at the A1 pulley of the middle and ring fingers   strength decreased  Sensation intact  Tinel sign negative            Diagnostic Studies:  None      Trigger finger right middle and ring with slight flexion contracture  Assessment:  As above    Plan:  I recommended surgery for trigger release and possible flexion contracture release of the right ring and middle finger.  The risks and benefits of surgery explained to the patient she understands it will she will most likely require physical therapy after surgery to work on the contracture      The risks and benefits of surgery were discussed with the patient today and they understand.  The consent was signed in the office for surgery.      Kenneth Tang MD (Jay)  [facility]  Orthopedic Upper Extremity Surgery      "

## 2019-11-19 ENCOUNTER — TELEPHONE (OUTPATIENT)
Dept: SURGERY | Facility: HOSPITAL | Age: 54
End: 2019-11-19

## 2019-12-02 ENCOUNTER — CLINICAL SUPPORT (OUTPATIENT)
Dept: LAB | Facility: HOSPITAL | Age: 54
End: 2019-12-02
Attending: ORTHOPAEDIC SURGERY
Payer: MEDICAID

## 2019-12-02 ENCOUNTER — HOSPITAL ENCOUNTER (OUTPATIENT)
Dept: PREADMISSION TESTING | Facility: HOSPITAL | Age: 54
Discharge: HOME | End: 2019-12-02
Attending: ORTHOPAEDIC SURGERY
Payer: MEDICAID

## 2019-12-02 VITALS
SYSTOLIC BLOOD PRESSURE: 129 MMHG | HEART RATE: 85 BPM | BODY MASS INDEX: 39.76 KG/M2 | WEIGHT: 268.44 LBS | OXYGEN SATURATION: 92 % | DIASTOLIC BLOOD PRESSURE: 74 MMHG | RESPIRATION RATE: 18 BRPM | HEIGHT: 69 IN

## 2019-12-02 DIAGNOSIS — Z01.818 PRE-OP TESTING: ICD-10-CM

## 2019-12-02 DIAGNOSIS — Z01.818 PRE-OP TESTING: Primary | ICD-10-CM

## 2019-12-02 PROCEDURE — 93005 ELECTROCARDIOGRAM TRACING: CPT

## 2019-12-02 RX ORDER — LIDOCAINE HYDROCHLORIDE 10 MG/ML
1 INJECTION, SOLUTION EPIDURAL; INFILTRATION; INTRACAUDAL; PERINEURAL ONCE
Status: CANCELLED | OUTPATIENT
Start: 2019-12-02 | End: 2019-12-02

## 2019-12-02 RX ORDER — SODIUM CHLORIDE, SODIUM LACTATE, POTASSIUM CHLORIDE, CALCIUM CHLORIDE 600; 310; 30; 20 MG/100ML; MG/100ML; MG/100ML; MG/100ML
INJECTION, SOLUTION INTRAVENOUS CONTINUOUS
Status: CANCELLED | OUTPATIENT
Start: 2019-12-02

## 2019-12-02 NOTE — PLAN OF CARE
Your surgery is scheduled for 12/3/2019.    Please report to Procedure Check In Room on the 2nd FLOOR at 10:00 a.m.          INSTRUCTIONS IMPORTANT!!!   Do not eat or drink after 12 midnight-including water. OK to brush teeth, no   gum, candy or mints!  \Take only these medicines with a small swallow of water-morning of surgery.  Phenobarbitol, Zarontin, and Diamox      ____  Proceed to [facility]  for additional testing.        ____  Do not wear makeup, including mascara.  ____  No powder, lotions or creams to surgical area.  ____  Please remove all jewelry, including piercings and leave at home.  ____  No money or valuables needed. Please leave at home.  ____  Please bring any documents given by your doctor.  ____  If going home the same day, arrange for a ride home. You will not be able to             drive if Anesthesia was used.  ____  Children under 18 years require a parent / guardian present the entire time             they are in surgery / recovery.  ____  Wear loose fitting clothing. Allow for dressings, bandages.  ____  Stop Aspirin, Ibuprofen, Motrin and Aleve at least 3-5 days before surgery, unless otherwise instructed by your doctor, or the nurse.   You MAY use Tylenol/acetaminophen until day of surgery.  ____  Wash the surgical area with Hibiclens the night before surgery, and again the             morning of surgery.  Be sure to rinse hibiclens off completely (if instructed by   nurse).  ____  If you take diabetic medication, do not take am of surgery unless instructed by Doctor.  ____  Call MD for temperature above 101 degrees or any other signs of infection such as Urinary (bladder) infection, Upper respiratory infection, skin boils, etc.  ____ Stop taking any Fish Oil supplement or any Vitamins that contain Vitamin E at least 5 days prior to surgery.  ____ Do Not wear your contact lenses the day of your procedure.  You may wear your glasses.      ____Do not shave surgical site for  3 days prior to surgery.  ____ Practice Good hand washing before, during, and after procedure.      I have read or had read and explained to me, and understand the above information.  Additional comments or instructions:  For additional questions call [phone]      ANESTHESIA SIDE EFFECTS  -For the first 24 hours after surgery:  Do not drive, use heavy equipment, make important decisions, or drink alcohol  -It is normal to feel sleepy for several hours.  Rest until you are more awake.  -Have someone stay with you, if needed.  They can watch for problems and help keep you safe.  -Some possible post anesthesia side effects include: nausea and vomiting, sore throat and hoarseness, sleepiness, and dizziness.        Pre-Op Bathing Instructions    Before surgery, you can play an important role in your own health.    Because skin is not sterile, we need to be sure that your skin is as free of germs as possible. By following the instructions below, you can reduce the number of germs on your skin before surgery.    IMPORTANT: You will need to shower with a special soap called Hibiclens*, available at any pharmacy.  If you are allergic to Chlorhexidine (the antiseptic in Hibiclens), use an antibacterial soap such as Dial Soap for your preoperative shower.  You will shower with Hibiclens both the night before your surgery and the morning of your surgery.  Do not use Hibiclens on the head, face or genitals to avoid injury to those areas.    STEP #1: THE NIGHT BEFORE YOUR SURGERY     1. Do not shave the area of your body where your surgery will be performed.  2. Shower and wash your hair and body as usual with your normal soap and shampoo.  3. Rinse your hair and body thoroughly after you shower to remove all soap residue.  4. With your hand, apply one packet of Hibiclens soap to the surgical site.   5. Wash the site gently for five (5) minutes. Do not scrub your skin too hard.   6. Do not wash with your regular soap after  Hibiclens is used.  7. Rinse your body thoroughly.  8. Pat yourself dry with a clean, soft towel.  9. Do not use lotion, cream, or powder.  10. Wear clean clothes.    STEP #2: THE MORNING OF YOUR SURGERY     1. Repeat Step #1.    * Not to be used by people allergic to Chlorhexidine.          Treating Trigger Finger     The tendon sheath is opened to release the tendon. Once the tendon can move freely again, the finger can bend and straighten more normally.     Trigger finger occurs when the tissue inside your finger or thumb becomes inflamed. Mild cases can be treated without surgery. If the problem is severe, surgery may be needed. Your doctor will discuss your options with you.  Nonsurgical treatment  For mild symptoms, your doctor may have you rest the finger or thumb. You may also be told to take anti-inflammatory medicines. These include ibuprofen or aspirin. You may be given an injection of medicine in the base of the finger or thumb. This typically is a steroid, such as cortisone.  Surgery  If nonsurgical treatments dont ease your symptoms, surgery may be recommended. A tendon is a cordlike fiber that attaches muscle to bone and allows joints to bend. The tendon is surrounded by a protective cover called a sheath. During surgery, the sheath in your finger or thumb is opened to enlarge the space and release the swollen tendon. This allows the finger or thumb to bend and straighten normally. Surgery takes about 20 minutes. It can often be done using a local anesthetic. You may be able to go home the same day. Your hand will be wrapped in a soft bandage. You may need to wear a plaster splint for a short time to keep the finger or thumb still as it heals. The stitches will be removed in about 2 weeks. Your doctor can discuss the risks and benefits of surgery with you.  Date Last Reviewed: 9/21/2015  © 5110-6492 [employer]. [address] [place] [address], [place] [address]. All rights reserved. This  information is not intended as a substitute for professional medical care. Always follow your healthcare professional's instructions.

## 2019-12-02 NOTE — PRE-PROCEDURE INSTRUCTIONS
Allergies, medical, surgical, family and psychosocial histories reviewed with patient. Periop plan of care discussed. Preop instructions given, including medications to take and to hold. Hibiclens soap and instructions on use given. Time given for questions and pt voiced understanding.      Pt contact number: [phone]

## 2019-12-02 NOTE — DISCHARGE INSTRUCTIONS
Your surgery is scheduled for 12/3/2019.    Please report to Procedure Check In Room on the 2nd FLOOR at 10:00 a.m.          INSTRUCTIONS IMPORTANT!!!  ¨ Do not eat or drink after 12 midnight-including water. OK to brush teeth, no   gum, candy or mints!    ¨ Take only these medicines with a small swallow of water-morning of surgery.  Phenobarbitol, Zarontin, and Diamox      ____  Proceed to [facility] on *** for additional testing.        ____  Do not wear makeup, including mascara.  ____  No powder, lotions or creams to surgical area.  ____  Please remove all jewelry, including piercings and leave at home.  ____  No money or valuables needed. Please leave at home.  ____  Please bring any documents given by your doctor.  ____  If going home the same day, arrange for a ride home. You will not be able to             drive if Anesthesia was used.  ____  Children under 18 years require a parent / guardian present the entire time             they are in surgery / recovery.  ____  Wear loose fitting clothing. Allow for dressings, bandages.  ____  Stop Aspirin, Ibuprofen, Motrin and Aleve at least 3-5 days before surgery, unless otherwise instructed by your doctor, or the nurse.   You MAY use Tylenol/acetaminophen until day of surgery.  ____  Wash the surgical area with Hibiclens the night before surgery, and again the             morning of surgery.  Be sure to rinse hibiclens off completely (if instructed by   nurse).  ____  If you take diabetic medication, do not take am of surgery unless instructed by Doctor.  ____  Call MD for temperature above 101 degrees or any other signs of infection such as Urinary (bladder) infection, Upper respiratory infection, skin boils, etc.  ____ Stop taking any Fish Oil supplement or any Vitamins that contain Vitamin E at least 5 days prior to surgery.  ____ Do Not wear your contact lenses the day of your procedure.  You may wear your glasses.      ____Do not shave surgical  site for 3 days prior to surgery.  ____ Practice Good hand washing before, during, and after procedure.      I have read or had read and explained to me, and understand the above information.  Additional comments or instructions:  For additional questions call [phone]      ANESTHESIA SIDE EFFECTS  -For the first 24 hours after surgery:  Do not drive, use heavy equipment, make important decisions, or drink alcohol  -It is normal to feel sleepy for several hours.  Rest until you are more awake.  -Have someone stay with you, if needed.  They can watch for problems and help keep you safe.  -Some possible post anesthesia side effects include: nausea and vomiting, sore throat and hoarseness, sleepiness, and dizziness.        Pre-Op Bathing Instructions    Before surgery, you can play an important role in your own health.    Because skin is not sterile, we need to be sure that your skin is as free of germs as possible. By following the instructions below, you can reduce the number of germs on your skin before surgery.    IMPORTANT: You will need to shower with a special soap called Hibiclens*, available at any pharmacy.  If you are allergic to Chlorhexidine (the antiseptic in Hibiclens), use an antibacterial soap such as Dial Soap for your preoperative shower.  You will shower with Hibiclens both the night before your surgery and the morning of your surgery.  Do not use Hibiclens on the head, face or genitals to avoid injury to those areas.    STEP #1: THE NIGHT BEFORE YOUR SURGERY     1. Do not shave the area of your body where your surgery will be performed.  2. Shower and wash your hair and body as usual with your normal soap and shampoo.  3. Rinse your hair and body thoroughly after you shower to remove all soap residue.  4. With your hand, apply one packet of Hibiclens soap to the surgical site.   5. Wash the site gently for five (5) minutes. Do not scrub your skin too hard.   6. Do not wash with your regular soap  after Hibiclens is used.  7. Rinse your body thoroughly.  8. Pat yourself dry with a clean, soft towel.  9. Do not use lotion, cream, or powder.  10. Wear clean clothes.    STEP #2: THE MORNING OF YOUR SURGERY     1. Repeat Step #1.    * Not to be used by people allergic to Chlorhexidine.          Treating Trigger Finger     The tendon sheath is opened to release the tendon. Once the tendon can move freely again, the finger can bend and straighten more normally.     Trigger finger occurs when the tissue inside your finger or thumb becomes inflamed. Mild cases can be treated without surgery. If the problem is severe, surgery may be needed. Your doctor will discuss your options with you.  Nonsurgical treatment  For mild symptoms, your doctor may have you rest the finger or thumb. You may also be told to take anti-inflammatory medicines. These include ibuprofen or aspirin. You may be given an injection of medicine in the base of the finger or thumb. This typically is a steroid, such as cortisone.  Surgery  If nonsurgical treatments dont ease your symptoms, surgery may be recommended. A tendon is a cordlike fiber that attaches muscle to bone and allows joints to bend. The tendon is surrounded by a protective cover called a sheath. During surgery, the sheath in your finger or thumb is opened to enlarge the space and release the swollen tendon. This allows the finger or thumb to bend and straighten normally. Surgery takes about 20 minutes. It can often be done using a local anesthetic. You may be able to go home the same day. Your hand will be wrapped in a soft bandage. You may need to wear a plaster splint for a short time to keep the finger or thumb still as it heals. The stitches will be removed in about 2 weeks. Your doctor can discuss the risks and benefits of surgery with you.  Date Last Reviewed: 9/21/2015  © 3524-4887 [employer] [facility]. [address] [place] [address], [place] [address]. All rights reserved. This  information is not intended as a substitute for professional medical care. Always follow your healthcare professional's instructions.

## 2019-12-03 ENCOUNTER — TELEPHONE (OUTPATIENT)
Dept: ORTHOPEDICS | Facility: CLINIC | Age: 54
End: 2019-12-03

## 2019-12-03 NOTE — TELEPHONE ENCOUNTER
----- Message from Radha Lassiter sent at 12/3/2019  8:07 AM CST -----  Contact: [phone]/self   Patient is requesting to speak with nurse regarding her surgery today. She states she is nauseous and might be to the shrimp she ate or a suzanna she had last night. Please advise.

## 2019-12-06 ENCOUNTER — TELEPHONE (OUTPATIENT)
Dept: ORTHOPEDICS | Facility: CLINIC | Age: 54
End: 2019-12-06

## 2019-12-06 NOTE — TELEPHONE ENCOUNTER
----- Message from Sofya Klein sent at 12/5/2019  2:43 PM CST -----  Contact: Pt   Pt would like a call back from staff to reschedule appt for surgery     Pt can be reached at [phone]

## 2019-12-06 NOTE — TELEPHONE ENCOUNTER
----- Message from Iona Mazariegos sent at 12/6/2019  1:53 PM CST -----  Contact: self / [phone]  Patient is requesting a call back regarding, her surgery. Please advise

## 2019-12-06 NOTE — TELEPHONE ENCOUNTER
----- Message from Iona Mazariegos sent at 12/6/2019  3:57 PM CST -----  Contact: self / [phone]  Patient is returning a call from your office. Please advise

## 2019-12-09 ENCOUNTER — TELEPHONE (OUTPATIENT)
Dept: ORTHOPEDICS | Facility: CLINIC | Age: 54
End: 2019-12-09

## 2019-12-09 NOTE — TELEPHONE ENCOUNTER
----- Message from Isaac Tamez sent at 12/9/2019 10:31 AM CST -----  Contact: Patient  Patient is returning your call  Please be advised    Patient can be reached at    [phone]

## 2019-12-16 ENCOUNTER — TELEPHONE (OUTPATIENT)
Dept: ORTHOPEDICS | Facility: CLINIC | Age: 54
End: 2019-12-16

## 2020-01-17 ENCOUNTER — TELEPHONE (OUTPATIENT)
Dept: ORTHOPEDICS | Facility: CLINIC | Age: 55
End: 2020-01-17

## 2020-01-17 NOTE — TELEPHONE ENCOUNTER
----- Message from Faustina Antunez sent at 1/17/2020  2:12 PM CST -----  Contact: self  Type:    Pre op and Surgery Appointment datesRequest      Name of Caller: Patient need to speak with nurse with regarding appointment for Pre  Op  And surgery dates   Patient states suppose to have an appointment for for 1/24/2020   I am not showing any appointments for 1/24/2019  When is the first available appointment?  Symptoms:  Best Call Back Number [phone]  Additional Information:

## 2020-01-20 ENCOUNTER — HOSPITAL ENCOUNTER (EMERGENCY)
Facility: HOSPITAL | Age: 55
Discharge: HOME | End: 2020-01-21
Attending: EMERGENCY MEDICINE
Payer: MEDICAID

## 2020-01-20 DIAGNOSIS — R52 PAIN: ICD-10-CM

## 2020-01-20 DIAGNOSIS — M65.331 TRIGGER MIDDLE FINGER OF RIGHT HAND: Primary | ICD-10-CM

## 2020-01-20 DIAGNOSIS — S52.125A CLOSED NONDISPLACED FRACTURE OF HEAD OF LEFT RADIUS, INITIAL ENCOUNTER: Primary | ICD-10-CM

## 2020-01-20 DIAGNOSIS — M25.522 PAIN AND SWELLING OF ELBOW, LEFT: ICD-10-CM

## 2020-01-20 DIAGNOSIS — M65.30 TRIGGER FINGER: ICD-10-CM

## 2020-01-20 DIAGNOSIS — M25.422 PAIN AND SWELLING OF ELBOW, LEFT: ICD-10-CM

## 2020-01-20 PROCEDURE — 25000003 PHARM REV CODE 250: Performed by: EMERGENCY MEDICINE

## 2020-01-20 PROCEDURE — 99284 PR EMERGENCY DEPT VISIT,LEVEL IV: ICD-10-PCS | Mod: ,,, | Performed by: EMERGENCY MEDICINE

## 2020-01-20 PROCEDURE — 99284 EMERGENCY DEPT VISIT MOD MDM: CPT | Mod: 25

## 2020-01-20 PROCEDURE — 99284 EMERGENCY DEPT VISIT MOD MDM: CPT | Mod: ,,, | Performed by: EMERGENCY MEDICINE

## 2020-01-20 RX ORDER — ACETAMINOPHEN 325 MG/1
650 TABLET ORAL
Status: COMPLETED | OUTPATIENT
Start: 2020-01-20 | End: 2020-01-20

## 2020-01-20 RX ORDER — CYCLOBENZAPRINE HCL 10 MG
10 TABLET ORAL
Status: COMPLETED | OUTPATIENT
Start: 2020-01-20 | End: 2020-01-20

## 2020-01-20 RX ORDER — IBUPROFEN 600 MG/1
600 TABLET ORAL
Status: COMPLETED | OUTPATIENT
Start: 2020-01-20 | End: 2020-01-20

## 2020-01-20 RX ADMIN — CYCLOBENZAPRINE 10 MG: 10 TABLET, FILM COATED ORAL at 11:01

## 2020-01-20 RX ADMIN — ACETAMINOPHEN 650 MG: 325 TABLET ORAL at 11:01

## 2020-01-20 RX ADMIN — IBUPROFEN 600 MG: 600 TABLET, FILM COATED ORAL at 11:01

## 2020-01-21 ENCOUNTER — TELEPHONE (OUTPATIENT)
Dept: ORTHOPEDICS | Facility: CLINIC | Age: 55
End: 2020-01-21

## 2020-01-21 VITALS
HEIGHT: 70 IN | OXYGEN SATURATION: 98 % | RESPIRATION RATE: 14 BRPM | TEMPERATURE: 99 F | WEIGHT: 276 LBS | DIASTOLIC BLOOD PRESSURE: 80 MMHG | SYSTOLIC BLOOD PRESSURE: 130 MMHG | HEART RATE: 61 BPM | BODY MASS INDEX: 39.51 KG/M2

## 2020-01-21 PROCEDURE — 25000003 PHARM REV CODE 250: Performed by: EMERGENCY MEDICINE

## 2020-01-21 RX ORDER — PHENOBARBITAL 32.4 MG/1
162 TABLET ORAL ONCE
Status: DISCONTINUED | OUTPATIENT
Start: 2020-01-21 | End: 2020-01-21 | Stop reason: HOSPADM

## 2020-01-21 RX ORDER — PHENOBARBITAL 32.4 MG/1
32.4 TABLET ORAL ONCE
Status: DISCONTINUED | OUTPATIENT
Start: 2020-01-21 | End: 2020-01-21

## 2020-01-21 RX ORDER — CYCLOBENZAPRINE HCL 5 MG
10 TABLET ORAL
Status: COMPLETED | OUTPATIENT
Start: 2020-01-21 | End: 2020-01-21

## 2020-01-21 RX ORDER — ACETAZOLAMIDE 250 MG/1
250 TABLET ORAL ONCE
Status: DISCONTINUED | OUTPATIENT
Start: 2020-01-21 | End: 2020-01-21 | Stop reason: HOSPADM

## 2020-01-21 RX ORDER — ETHOSUXIMIDE 250 MG/5ML
250 SOLUTION ORAL ONCE
Status: DISCONTINUED | OUTPATIENT
Start: 2020-01-21 | End: 2020-01-21 | Stop reason: HOSPADM

## 2020-01-21 RX ORDER — CYCLOBENZAPRINE HCL 10 MG
10 TABLET ORAL 3 TIMES DAILY PRN
Qty: 9 TABLET | Refills: 0 | Status: SHIPPED | OUTPATIENT
Start: 2020-01-21 | End: 2020-01-24

## 2020-01-21 RX ADMIN — CYCLOBENZAPRINE HYDROCHLORIDE 10 MG: 5 TABLET, FILM COATED ORAL at 02:01

## 2020-01-21 NOTE — ED NOTES
Patient identifiers for Trudi Howard 54 y.o. female checked and correct.  Chief Complaint   Patient presents with    Fall     Pt was walking and tripped and fell onto LUE. Pt denies head injury. No obvious deformity.      Past Medical History:   Diagnosis Date    History of uterine fibroid     Intra-abdominal abscess 04/2017    Urinary retention, ruptured bladder, infected urinoma, sepsis    Morbid obesity     RUSSELL (obstructive sleep apnea)     Ovarian cyst 2017    Seizures      Allergies reported:   Review of patient's allergies indicates:   Allergen Reactions    Aspirin      Contraindicated with other meds    Amoxicillin      rash    Milk containing products     Peanut          LOC: Patient is awake, alert, and aware of environment with an appropriate affect. Patient is oriented x 4 and speaking appropriately.  APPEARANCE: Patient resting comfortably and in no acute distress. Patient is clean and well groomed, patient's clothing is properly fastened.  SKIN: The skin is warm and dry. Patient has normal skin turgor and moist mucus membranes.   MUSKULOSKELETAL: Patient is moving all extremities well, no obvious deformities noted. Pulses intact. Patient reports falling and injuring her left arm from her bicep down to her hand. Pain rating 12/10. Able to wiggle fingers.   RESPIRATORY: Airway is open and patent. Respirations are spontaneous and non-labored with normal effort and rate. Denies SOB.  CARDIAC: Patient has a normal rate and rhythm. No peripheral edema noted. Denies chest pain.   ABDOMEN: No distention noted. Soft and non-tender upon palpation. Denies nausea and vomiting.  NEUROLOGICAL: PERRL. Facial expression is symmetrical. Hand grasps are equal bilaterally. Normal sensation in all extremities when touched with finger.

## 2020-01-21 NOTE — TELEPHONE ENCOUNTER
----- Message from Kenneth Tang Jr., MD sent at 1/21/2020  1:50 PM CST -----  Contact: [phone]/self  Also who is she seeing for the elbow?  We can see her Thursday- maybe me or a PA  ----- Message -----  From: Mary Perez MA  Sent: 1/21/2020   1:25 PM CST  To: Kenneth Tang Jr., MD    Pt fell yesterday and has a closed nondisplaced left radial head fracture. She wants to know if you will still do surgery on her Friday for her trigger fingers on the right hand.      ----- Message -----  From: Talya Kaur  Sent: 1/21/2020   1:09 PM CST  To: [facility] Staff     Patient calling to speak with you concerning her upcoming surgery  Please advise

## 2020-01-21 NOTE — TELEPHONE ENCOUNTER
Returned call to pt let her know that Dr Tang said her sx needs to be cancelled since she broke her other arm.

## 2020-01-21 NOTE — TELEPHONE ENCOUNTER
----- Message from Kenneth Tang Jr., MD sent at 1/21/2020  1:48 PM CST -----  Contact: [phone]/self  No we need to reschedule  ----- Message -----  From: Mary Perez MA  Sent: 1/21/2020   1:25 PM CST  To: Kenneth Tang Jr., MD    Pt fell yesterday and has a closed nondisplaced left radial head fracture. She wants to know if you will still do surgery on her Friday for her trigger fingers on the right hand.      ----- Message -----  From: Talya Kaur  Sent: 1/21/2020   1:09 PM CST  To: [facility]     Patient calling to speak with you concerning her upcoming surgery  Please advise

## 2020-01-21 NOTE — CONSULTS
Orthopedic Surgery  Consult Note    Trudi Howard  2020    CC: left elbow pain    HPI: Trudi Howard is a LHD 54 y.o. female with PMHx significant for RUSSELL and seizures who presents with left elbow pain. Patients states that she sustained a mechanical fall from standing onto her left arm earlier today. Denies head injury or LOC. Denies prior injuries or surgeries to the left arm. On nothing for blood thinners. Denies other MSK pains or paresthesias.  She is a patient of Dr. Tang and is scheduled to have ORIF of right scaphoid later this week.      Past Medical History:   Diagnosis Date    History of uterine fibroid     Intra-abdominal abscess 2017    Urinary retention, ruptured bladder, infected urinoma, sepsis    Morbid obesity     RUSSELL (obstructive sleep apnea)     Ovarian cyst 2017    Seizures      Past Surgical History:   Procedure Laterality Date    BREAST SURGERY      BUNIONECTOMY       SECTION      Exploratory Laparotomy w/ Abscess Drainage  2017        fibroidectomy      INCISIONAL HERNIA REPAIR  2019    Laparoscopic-      Family History   Adopted: Yes   Problem Relation Age of Onset    Heart disease Mother      Social History     Socioeconomic History    Marital status: Legally      Spouse name: Not on file    Number of children: Not on file    Years of education: Not on file    Highest education level: Not on file   Occupational History    Not on file   Social Needs    Financial resource strain: Not on file    Food insecurity:     Worry: Not on file     Inability: Not on file    Transportation needs:     Medical: Not on file     Non-medical: Not on file   Tobacco Use    Smoking status: Never Smoker    Smokeless tobacco: Never Used   Substance and Sexual Activity    Alcohol use: Yes     Comment: seldom    Drug use: No    Sexual activity: Yes     Partners: Male     Birth control/protection: None     Comment:     Lifestyle    Physical activity:     Days per week: Not on file     Minutes per session: Not on file    Stress: Not on file   Relationships    Social connections:     Talks on phone: Not on file     Gets together: Not on file     Attends [employer] service: Not on file     Active member of club or organization: Not on file     Attends meetings of clubs or organizations: Not on file     Relationship status: Not on file   Other Topics Concern    Not on file   Social History Narrative    Not on file     No current facility-administered medications on file prior to encounter.      Current Outpatient Medications on File Prior to Encounter   Medication Sig    acetaZOLAMIDE (DIAMOX) 250 MG tablet     ethosuximide (ZARONTIN) 250 mg capsule Take 250 mg by mouth 2 (two) times daily.      phenobarbital (LUMINAL) 32.4 MG tablet 3 QAM AND 5 TS HS    albuterol (VENTOLIN HFA) 90 mcg/actuation inhaler Inhale 2 puffs into the lungs every 6 (six) hours as needed for Wheezing. Rescue    cetirizine (ZYRTEC) 10 MG tablet Take 1 tablet (10 mg total) by mouth once daily.         Review of Systems:  Constitutional: negative for fevers  Eyes: negative visual changes  ENT: negative for hearing loss  Respiratory: negative for dyspnea  Cardiovascular: negative for chest pain  Gastrointestinal: negative for abdominal pain  Genitourinary: negative for dysuria  Neurological: negative for headaches  Behavioral/Psych: negative for hallucinations  Endocrine: negative for temperature intolerance      Physical Exam:    Temp:  [98.9 °F (37.2 °C)] 98.9 °F (37.2 °C)  Pulse:  [61-72] 61  Resp:  [14-16] 14  SpO2:  [97 %-98 %] 98 %  BP: (130-183)/(80-85) 130/80    Vitals: Afebrile.  Vital signs stable.  General: No acute distress.  HEENT: Normocephalic. Atraumatic. Sclera anicteric. No tracheal deviation.  Cardio: Regular rate.  Chest: No increased work of breathing.  Abdominal: Nondistended.  Extremities: No cyanosis.  No clubbing.     Skin: No generalized rash.  Neuro: Awake. Alert. Oriented to person, place, time, and situation.  Psych: Normal appearance. Cooperative.  Appropriate mood.  Appropriate affect.      MSK:  LUE:  Skin intact throughout, no scars present  No ecchymosis, erythema, or signs of cellulitis  Minimal swelling around the elbow  No deformity at the elbow  TTP globally around the elbow but mostly localized to the radial head  No tenderness to palpation of proximal or middle aspects of humerus  No tenderness to palpation of middle or distal aspects of radius or ulna  No tenderness to palpation of hand  ROM at elbow limited due to pain      Flexion: 80 degrees      Extension: 10 degrees  ROM at forearm limited due to pain  Full painless ROM at shoulder and wrist  Compartments soft  SILT M/U/R  Motor intact AIN/PIN/M/U/R  2+ brachial, radial, ulnar pulses  Brisk capillary refill           Diagnostic Results:  X-ray and CT of the left elbow show nondisplaced radial head fracture with possible extension to the radial neck      Assessment/Plan:  Trudi Howard is a 54 y.o. female with a nondisplaced radial head fracture  - placed in sling in ED   - NWB to left upper extremity, pt encouraged to keep extremity iced and elevated at all times  - Patient educated on the signs and symptoms of compartment syndrome and instructed to return to the hospital immediately if these symptoms arise  - patient wishes to follow up with her orthopedic surgeon, Dr. Tang.  Advised patient to make Dr. Tang aware of this injury in case he wants to delay surgery to the right wrist that is currently scheduled for later this week.  Gave patient contact information for [facility] in case Dr. Tang is not able to follow this elbow injury.  She agrees to call if she needs follow-up for this new injury.  - pain control per ED    Ashish Song MD  Orthopedic Surgery Resident  01/21/2020

## 2020-01-21 NOTE — PROVIDER PROGRESS NOTES - EMERGENCY DEPT.
Signed out to me from previous attending pending final orthopedic recommendations.  Approximately 310 CT of the arm returns which confirms fracture.  I contacted Orthopedic surgery, and approximately 330 they stated the patient is stable for discharge, could follow up with them or with her own orthopedic doctor that she has seen in the past.  The patient is refusing any narcotic pain medications, but states that Flexeril is helpful.  Will give short course of this for home.  Advised pt to follow up with PCP or return if concerning symptoms arise. Pt understands and agrees with plan. Will d/c home.

## 2020-01-21 NOTE — ED PROVIDER NOTES
Encounter Date: 2020    SCRIBE #1 NOTE: I, Ketty Rizzo, am scribing for, and in the presence of,  Izabel Posadas MD. I have scribed the following portions of the note - Other sections scribed: HPI ROS PE.       History     Chief Complaint   Patient presents with    Fall     Pt was walking and tripped and fell onto LUE. Pt denies head injury. No obvious deformity.      Time patient was seen by the provider: 10:50 PM    The patient is a 54 y.o. female with past medical history of seizures, who presents to the ED with a complaint of left upper extremity pain s/p trip and fall. Patient reports she was walking out of [employer] at 9 PM today when she trip and fell onto her left arm. Denies head injury or LOC. Denies falling on knees. Denies taking medication for pain.     The history is provided by the patient.     Review of patient's allergies indicates:   Allergen Reactions    Aspirin      Contraindicated with other meds    Amoxicillin      rash    Milk containing products     Peanut      Past Medical History:   Diagnosis Date    History of uterine fibroid     Intra-abdominal abscess 2017    Urinary retention, ruptured bladder, infected urinoma, sepsis    Morbid obesity     RUSSELL (obstructive sleep apnea)     Ovarian cyst 2017    Seizures      Past Surgical History:   Procedure Laterality Date    BREAST SURGERY      BUNIONECTOMY       SECTION      Exploratory Laparotomy w/ Abscess Drainage  2017        fibroidectomy      INCISIONAL HERNIA REPAIR  2019    Laparoscopic-      Family History   Adopted: Yes   Problem Relation Age of Onset    Heart disease Mother      Social History     Tobacco Use    Smoking status: Never Smoker    Smokeless tobacco: Never Used   Substance Use Topics    Alcohol use: Yes     Comment: seldom    Drug use: No     Review of Systems   Constitutional: Negative for fever.   HENT: Negative for sore throat.    Respiratory: Negative  for shortness of breath.    Cardiovascular: Negative for chest pain.   Gastrointestinal: Negative for nausea.   Genitourinary: Negative for dysuria.   Musculoskeletal: Negative for back pain.        +Left upper extremity pain   Skin: Negative for rash.   Neurological: Negative for weakness.   Hematological: Does not bruise/bleed easily.       Physical Exam     Initial Vitals [01/20/20 2155]   BP Pulse Resp Temp SpO2   (!) 183/85 72 16 98.9 °F (37.2 °C) 97 %      MAP       --         Physical Exam  Gen: AxOx4, NAD, appears stated age  Eye: EOMI, no scleral icterus, no periorbital edema or ecchymosis  Head: NCAT, no lesions  ENT: neck supple, no stridor, no masses  CVS: warm and well perfused  PULM: normal work of breathing, no stridor  ABD: scaphoid, nondistended  Ext: no rash, no deformities. Entire left arm is tender to palpation, even to light touch, including her anatomical snuff box, forearm, elbow, humerus, left shoulder, and left clavicle. No obvious deformity.  Though she is reluctant to move her shoulder and elbow, she has full range of motion of her wrist. Full range of motion and motor strength in fingers, distally. Radial pulse is 2+.   Neuro: SCHAFER, gait intact    ED Course   Procedures  Labs Reviewed - No data to display       Imaging Results          X-Ray Wrist Complete Left (Final result)  Result time 01/21/20 00:10:32    Final result by Shorty Amato MD (01/21/20 00:10:32)                 Impression:      Elevation of the elbow fat pads suggesting joint effusion.  Nondisplaced left radial head/neck fracture with slight impaction. This is best seen on the forearm views.    No additional acute fracture.  No dislocation.    6.4 mm lucency in the left carpal navicular possibly representing a small ganglion cyst.  DJD.      Electronically signed by: Shorty Amato MD  Date:    01/21/2020  Time:    00:10             Narrative:    EXAMINATION:  XR HUMERUS 2 VIEW LEFT; XR WRIST COMPLETE 3 VIEWS LEFT; XR  FOREARM LEFT; XR ELBOW COMPLETE 3 VIEW LEFT; XR SHOULDER TRAUMA 3 VIEW LEFT    CLINICAL HISTORY:  Pain, unspecified; Pain in left elbow    TECHNIQUE:  Left shoulder three views, left humerus two views, left elbow three views, left forearm two views and left wrist three views.    COMPARISON:  None    FINDINGS:  Elevation of the elbow fat pads suggesting joint effusion.  Nondisplaced left radial head/neck fracture with slight impaction.  This is best seen on the forearm views.No additional acute fracture.  No dislocation.  Ulna minus variation.  6.4 mm lucency in the left carpal navicula possibly representing a small ganglion cyst.  Mild degenerative changes left shoulder and elbow.                               X-Ray Forearm Left (Final result)  Result time 01/21/20 00:10:32    Final result by Shorty Amato MD (01/21/20 00:10:32)                 Impression:      Elevation of the elbow fat pads suggesting joint effusion.  Nondisplaced left radial head/neck fracture with slight impaction. This is best seen on the forearm views.    No additional acute fracture.  No dislocation.    6.4 mm lucency in the left carpal navicular possibly representing a small ganglion cyst.  DJD.      Electronically signed by: Shorty Amato MD  Date:    01/21/2020  Time:    00:10             Narrative:    EXAMINATION:  XR HUMERUS 2 VIEW LEFT; XR WRIST COMPLETE 3 VIEWS LEFT; XR FOREARM LEFT; XR ELBOW COMPLETE 3 VIEW LEFT; XR SHOULDER TRAUMA 3 VIEW LEFT    CLINICAL HISTORY:  Pain, unspecified; Pain in left elbow    TECHNIQUE:  Left shoulder three views, left humerus two views, left elbow three views, left forearm two views and left wrist three views.    COMPARISON:  None    FINDINGS:  Elevation of the elbow fat pads suggesting joint effusion.  Nondisplaced left radial head/neck fracture with slight impaction.  This is best seen on the forearm views.No additional acute fracture.  No dislocation.  Ulna minus variation.  6.4 mm lucency in  the left carpal navicula possibly representing a small ganglion cyst.  Mild degenerative changes left shoulder and elbow.                               X-Ray Elbow Complete Left (Final result)  Result time 01/21/20 00:10:32    Final result by Shorty Amato MD (01/21/20 00:10:32)                 Impression:      Elevation of the elbow fat pads suggesting joint effusion.  Nondisplaced left radial head/neck fracture with slight impaction. This is best seen on the forearm views.    No additional acute fracture.  No dislocation.    6.4 mm lucency in the left carpal navicular possibly representing a small ganglion cyst.  DJD.      Electronically signed by: Shorty Amato MD  Date:    01/21/2020  Time:    00:10             Narrative:    EXAMINATION:  XR HUMERUS 2 VIEW LEFT; XR WRIST COMPLETE 3 VIEWS LEFT; XR FOREARM LEFT; XR ELBOW COMPLETE 3 VIEW LEFT; XR SHOULDER TRAUMA 3 VIEW LEFT    CLINICAL HISTORY:  Pain, unspecified; Pain in left elbow    TECHNIQUE:  Left shoulder three views, left humerus two views, left elbow three views, left forearm two views and left wrist three views.    COMPARISON:  None    FINDINGS:  Elevation of the elbow fat pads suggesting joint effusion.  Nondisplaced left radial head/neck fracture with slight impaction.  This is best seen on the forearm views.No additional acute fracture.  No dislocation.  Ulna minus variation.  6.4 mm lucency in the left carpal navicula possibly representing a small ganglion cyst.  Mild degenerative changes left shoulder and elbow.                               X-Ray Humerus 2 View Left (Final result)  Result time 01/21/20 00:10:32    Final result by Shorty Amato MD (01/21/20 00:10:32)                 Impression:      Elevation of the elbow fat pads suggesting joint effusion.  Nondisplaced left radial head/neck fracture with slight impaction. This is best seen on the forearm views.    No additional acute fracture.  No dislocation.    6.4 mm lucency in the  left carpal navicular possibly representing a small ganglion cyst.  DJD.      Electronically signed by: Shorty mAato MD  Date:    01/21/2020  Time:    00:10             Narrative:    EXAMINATION:  XR HUMERUS 2 VIEW LEFT; XR WRIST COMPLETE 3 VIEWS LEFT; XR FOREARM LEFT; XR ELBOW COMPLETE 3 VIEW LEFT; XR SHOULDER TRAUMA 3 VIEW LEFT    CLINICAL HISTORY:  Pain, unspecified; Pain in left elbow    TECHNIQUE:  Left shoulder three views, left humerus two views, left elbow three views, left forearm two views and left wrist three views.    COMPARISON:  None    FINDINGS:  Elevation of the elbow fat pads suggesting joint effusion.  Nondisplaced left radial head/neck fracture with slight impaction.  This is best seen on the forearm views.No additional acute fracture.  No dislocation.  Ulna minus variation.  6.4 mm lucency in the left carpal navicula possibly representing a small ganglion cyst.  Mild degenerative changes left shoulder and elbow.                               X-Ray Shoulder Trauma Left (Final result)  Result time 01/21/20 00:10:32    Final result by Shorty Amato MD (01/21/20 00:10:32)                 Impression:      Elevation of the elbow fat pads suggesting joint effusion.  Nondisplaced left radial head/neck fracture with slight impaction. This is best seen on the forearm views.    No additional acute fracture.  No dislocation.    6.4 mm lucency in the left carpal navicular possibly representing a small ganglion cyst.  DJD.      Electronically signed by: Shorty Amato MD  Date:    01/21/2020  Time:    00:10             Narrative:    EXAMINATION:  XR HUMERUS 2 VIEW LEFT; XR WRIST COMPLETE 3 VIEWS LEFT; XR FOREARM LEFT; XR ELBOW COMPLETE 3 VIEW LEFT; XR SHOULDER TRAUMA 3 VIEW LEFT    CLINICAL HISTORY:  Pain, unspecified; Pain in left elbow    TECHNIQUE:  Left shoulder three views, left humerus two views, left elbow three views, left forearm two views and left wrist three  views.    COMPARISON:  None    FINDINGS:  Elevation of the elbow fat pads suggesting joint effusion.  Nondisplaced left radial head/neck fracture with slight impaction.  This is best seen on the forearm views.No additional acute fracture.  No dislocation.  Ulna minus variation.  6.4 mm lucency in the left carpal navicula possibly representing a small ganglion cyst.  Mild degenerative changes left shoulder and elbow.                                 Medical Decision Making:   History:   Old Medical Records: I decided to obtain old medical records.  Initial Assessment:   This is a 54-year-old woman with a history of seizure disorder on phenobarbital who presents after mechanical fall outside of a shopping center, with significant left upper extremity pain extending from her wrist to her shoulder.  Her it is difficult to ascertain on her exam if she has muscular or bony tenderness given the degree of pain she is in.  Her compartments are soft, I have low suspicion for compartment syndrome.  She does not have any deformities, but given her pain we will x-ray the entire left upper extremity.  Her distal pulses and motor exam are intact, I have low suspicion for neurovascular injury. She reports only flexeril helps with her pain, declines offer of oxycodone or other opiates.   Clinical Tests:   Radiological Study: Ordered and Reviewed  ED Management:  Patient's x-rays by my independent interpretation are notable for radial head fracture.  I informed the patient of her results, and I discussed the patient with Orthopedic surgery who will see her.  They requested a CT scan of the elbow, which I have ordered.  Patient passed off to the overnight attending, awaiting final recommendations of Orthopedics.            Scribe Attestation:   Scribe #1: I performed the above scribed service and the documentation accurately describes the services I performed. I attest to the accuracy of the note.               Physician Attestation  for Scribe:  Physician Attestation Statement for Scribe #1: I, Izabel Posadas, reviewed documentation, as scribed by Ketty Rizzo in my presence, and it is both accurate and complete.            Clinical Impression:       ICD-10-CM ICD-9-CM   1. Closed nondisplaced fracture of head of left radius, initial encounter S52.125A 813.05   2. Pain R52 780.96   3. Pain and swelling of elbow, left M25.522 719.42    M25.422 719.02                             Izabel Posadas MD  01/21/20 0032

## 2020-01-23 ENCOUNTER — OFFICE VISIT (OUTPATIENT)
Dept: ORTHOPEDICS | Facility: CLINIC | Age: 55
End: 2020-01-23
Payer: MEDICAID

## 2020-01-23 VITALS — HEIGHT: 69 IN | BODY MASS INDEX: 40.88 KG/M2 | WEIGHT: 276 LBS

## 2020-01-23 DIAGNOSIS — S52.125A CLOSED NONDISPLACED FRACTURE OF HEAD OF LEFT RADIUS, INITIAL ENCOUNTER: Primary | ICD-10-CM

## 2020-01-23 PROCEDURE — 99214 PR OFFICE/OUTPT VISIT, EST, LEVL IV, 30-39 MIN: ICD-10-PCS | Mod: S$PBB,,, | Performed by: ORTHOPAEDIC SURGERY

## 2020-01-23 PROCEDURE — 99213 OFFICE O/P EST LOW 20 MIN: CPT | Mod: PBBFAC,PN | Performed by: ORTHOPAEDIC SURGERY

## 2020-01-23 PROCEDURE — 99214 OFFICE O/P EST MOD 30 MIN: CPT | Mod: S$PBB,,, | Performed by: ORTHOPAEDIC SURGERY

## 2020-01-23 PROCEDURE — 99999 PR PBB SHADOW E&M-EST. PATIENT-LVL III: ICD-10-PCS | Mod: PBBFAC,,, | Performed by: ORTHOPAEDIC SURGERY

## 2020-01-23 PROCEDURE — 99999 PR PBB SHADOW E&M-EST. PATIENT-LVL III: CPT | Mod: PBBFAC,,, | Performed by: ORTHOPAEDIC SURGERY

## 2020-01-23 RX ORDER — TRAMADOL HYDROCHLORIDE 50 MG/1
50 TABLET ORAL EVERY 4 HOURS PRN
Qty: 40 TABLET | Refills: 0 | Status: SHIPPED | OUTPATIENT
Start: 2020-01-23 | End: 2023-12-10

## 2020-01-23 NOTE — PROGRESS NOTES
Subjective:      Patient ID: Trudi Howard is a 54 y.o. female.    Chief Complaint: Fall (pt fell Monday - 01/20/20 in [employer] parking lot ); Arm Injury (left ); and Elbow Injury (left )      HPI: Trudi Howard is here in follow-up of emergency department visit.  Patient reports sustaining fall outside of a shopping mall on 01[place]2020.  She reported to the emergency department which time x-rays of her entire forearm and shoulder were performed.  X-rays were only significant for anterior posterior fat pad suggestive of radial head fracture.  Subsequently a CT with 3D reconstruction was performed which is suggestive of nondisplaced radial head neck fracture, fat pad elevation and joint effusion. Patient was treated with a sling and referred to Orthopedics.  Patient has been wearing the sling mostly full time. She reports pain control has been difficult with over-the-counter analgesia.  She denies any numbness and tingling.     Past Medical History:   Diagnosis Date    History of uterine fibroid     Intra-abdominal abscess 04/2017    Urinary retention, ruptured bladder, infected urinoma, sepsis    Morbid obesity     RUSSELL (obstructive sleep apnea)     Ovarian cyst 2017    Seizures        Current Outpatient Medications:     acetaZOLAMIDE (DIAMOX) 250 MG tablet, , Disp: , Rfl: 4    cyclobenzaprine (FLEXERIL) 10 MG tablet, Take 1 tablet (10 mg total) by mouth 3 (three) times daily as needed for Muscle spasms., Disp: 9 tablet, Rfl: 0    ethosuximide (ZARONTIN) 250 mg capsule, Take 250 mg by mouth 2 (two) times daily.  , Disp: , Rfl:     phenobarbital (LUMINAL) 32.4 MG tablet, 3 QAM AND 5 TS HS, Disp: , Rfl: 5    albuterol (VENTOLIN HFA) 90 mcg/actuation inhaler, Inhale 2 puffs into the lungs every 6 (six) hours as needed for Wheezing. Rescue, Disp: 18 g, Rfl: 0    cetirizine (ZYRTEC) 10 MG tablet, Take 1 tablet (10 mg total) by mouth once daily. (Patient not taking: Reported on 1/23/2020),  "Disp: 30 tablet, Rfl: 2  Review of patient's allergies indicates:   Allergen Reactions    Aspirin      Contraindicated with other meds    Amoxicillin      rash    Milk containing products     Peanut        Ht 5' 9" (1.753 m)   Wt 125.2 kg (276 lb)   BMI 40.76 kg/m²     Review of Systems   Constitution: Negative for chills and fever.   Cardiovascular: Negative for chest pain and palpitations.   Respiratory: Negative for shortness of breath and wheezing.    Skin: Negative for poor wound healing and rash.   Musculoskeletal: Positive for falls, joint pain and stiffness.   Gastrointestinal: Negative for nausea and vomiting.   Genitourinary: Negative for dysuria and hematuria.   Neurological: Negative for seizures and tremors.   Psychiatric/Behavioral: Negative for altered mental status.   Allergic/Immunologic: Negative for environmental allergies and persistent infections.         Objective:    Ortho Exam       Left upper extremity:  Significant for swelling around the elbow.  Positive tenderness to light touch globally.  Extreme pain elicited with palpation of the radial head. Joint effusion noted. ROM mostly full limited to about 5-10 degrees extension. Pronation and supination was not attempted.  Range of motion wrist and fingers full.  Sensation intact. Pulses present.  Cap refill brisk.  GEN: Well developed, well nourished female. AAOX3. No acute distress.   Normocephalic, atraumatic.   PARVEEN  Breathing unlabored.  Mood and affect appropriate.     Assessment:     Imaging:  All radiographs and CT images were reviewed and are as stated above.        1. Closed nondisplaced fracture of head of left radius, initial encounter          Plan:           Explained to the problem the patient regards to radial head fracture.  I recommended gentle range of motion, ice, rest.  Sling for public crowded places only.  Tramadol prn  Follow-up in 2 weeks for repeat x-rays.    Follow up in about 2 weeks (around 2/6/2020).        "

## 2020-02-06 ENCOUNTER — HOSPITAL ENCOUNTER (OUTPATIENT)
Dept: RADIOLOGY | Facility: HOSPITAL | Age: 55
Discharge: HOME | End: 2020-02-06
Attending: ORTHOPAEDIC SURGERY
Payer: MEDICAID

## 2020-02-06 ENCOUNTER — OFFICE VISIT (OUTPATIENT)
Dept: ORTHOPEDICS | Facility: CLINIC | Age: 55
End: 2020-02-06
Payer: MEDICAID

## 2020-02-06 VITALS — BODY MASS INDEX: 40.88 KG/M2 | WEIGHT: 276 LBS | HEIGHT: 69 IN

## 2020-02-06 DIAGNOSIS — S52.125A CLOSED NONDISPLACED FRACTURE OF HEAD OF LEFT RADIUS, INITIAL ENCOUNTER: ICD-10-CM

## 2020-02-06 DIAGNOSIS — S52.125A CLOSED NONDISPLACED FRACTURE OF HEAD OF LEFT RADIUS, INITIAL ENCOUNTER: Primary | ICD-10-CM

## 2020-02-06 PROCEDURE — 73080 X-RAY EXAM OF ELBOW: CPT | Mod: TC,PN,LT

## 2020-02-06 PROCEDURE — 99213 OFFICE O/P EST LOW 20 MIN: CPT | Mod: PBBFAC,25,PN | Performed by: ORTHOPAEDIC SURGERY

## 2020-02-06 PROCEDURE — 99213 OFFICE O/P EST LOW 20 MIN: CPT | Mod: S$PBB,,, | Performed by: ORTHOPAEDIC SURGERY

## 2020-02-06 PROCEDURE — 73080 XR ELBOW COMPLETE 3 VIEW LEFT: ICD-10-PCS | Mod: 26,LT,, | Performed by: RADIOLOGY

## 2020-02-06 PROCEDURE — 99999 PR PBB SHADOW E&M-EST. PATIENT-LVL III: CPT | Mod: PBBFAC,,, | Performed by: ORTHOPAEDIC SURGERY

## 2020-02-06 PROCEDURE — 99999 PR PBB SHADOW E&M-EST. PATIENT-LVL III: ICD-10-PCS | Mod: PBBFAC,,, | Performed by: ORTHOPAEDIC SURGERY

## 2020-02-06 PROCEDURE — 73080 X-RAY EXAM OF ELBOW: CPT | Mod: 26,LT,, | Performed by: RADIOLOGY

## 2020-02-06 PROCEDURE — 99213 PR OFFICE/OUTPT VISIT, EST, LEVL III, 20-29 MIN: ICD-10-PCS | Mod: S$PBB,,, | Performed by: ORTHOPAEDIC SURGERY

## 2020-02-06 RX ORDER — HYDROCODONE BITARTRATE AND ACETAMINOPHEN 5; 325 MG/1; MG/1
1 TABLET ORAL EVERY 8 HOURS PRN
Qty: 30 TABLET | Refills: 0 | Status: SHIPPED | OUTPATIENT
Start: 2020-02-06 | End: 2023-12-10

## 2020-02-06 NOTE — PROGRESS NOTES
"Subjective:      Patient ID: Trudi Howard is a 54 y.o. female.    Chief Complaint: Pain of the Left Upper Arm      HPI: Trudi Howard is here in follow-up of left radial head fracture.  Initial injury was approximately 2 weeks ago.  Patient has been compliant with gentle ROM at home and sling for crowded places. She reports pain control is occasionally difficult with tramadol.     Past Medical History:   Diagnosis Date    History of uterine fibroid     Intra-abdominal abscess 04/2017    Urinary retention, ruptured bladder, infected urinoma, sepsis    Morbid obesity     RUSSELL (obstructive sleep apnea)     Ovarian cyst 2017    Seizures        Current Outpatient Medications:     acetaZOLAMIDE (DIAMOX) 250 MG tablet, , Disp: , Rfl: 4    cetirizine (ZYRTEC) 10 MG tablet, Take 1 tablet (10 mg total) by mouth once daily., Disp: 30 tablet, Rfl: 2    ethosuximide (ZARONTIN) 250 mg capsule, Take 250 mg by mouth 2 (two) times daily.  , Disp: , Rfl:     phenobarbital (LUMINAL) 32.4 MG tablet, 3 QAM AND 5 TS HS, Disp: , Rfl: 5    traMADol (ULTRAM) 50 mg tablet, Take 1 tablet (50 mg total) by mouth every 4 (four) hours as needed for Pain., Disp: 40 tablet, Rfl: 0    albuterol (VENTOLIN HFA) 90 mcg/actuation inhaler, Inhale 2 puffs into the lungs every 6 (six) hours as needed for Wheezing. Rescue, Disp: 18 g, Rfl: 0    HYDROcodone-acetaminophen (NORCO) 5-325 mg per tablet, Take 1 tablet by mouth every 8 (eight) hours as needed for Pain., Disp: 30 tablet, Rfl: 0  Review of patient's allergies indicates:   Allergen Reactions    Aspirin      Contraindicated with other meds    Amoxicillin      rash    Milk containing products     Peanut        Ht 5' 9" (1.753 m)   Wt 125.2 kg (276 lb)   BMI 40.76 kg/m²     Review of Systems   Constitution: Negative for chills and fever.   Cardiovascular: Negative for chest pain and palpitations.   Respiratory: Negative for shortness of breath and wheezing.    Skin: " "Negative for poor wound healing and rash.   Musculoskeletal: Positive for joint pain, joint swelling and stiffness.   Gastrointestinal: Negative for nausea and vomiting.   Genitourinary: Negative for dysuria and hematuria.   Neurological: Negative for numbness, paresthesias, seizures and tremors.   Psychiatric/Behavioral: Negative for altered mental status.   Allergic/Immunologic: Negative for environmental allergies and persistent infections.         Objective:    Ortho Exam       Left upper extremity:  Mild swelling around the elbow.  Positive tenderness to radial head. . ROM mostly full limited to about 5 degrees extension. Pronation and supination mostly full but slightly limited.  Range of motion wrist and fingers full.  Sensation intact. Pulses present.  Cap refill brisk.  GEN: Well developed, well nourished female. AAOX3. No acute distress.   Normocephalic, atraumatic.   PARVEEN  Breathing unlabored.  Mood and affect appropriate.     Assessment:     Imaging: Repeat elbow xrays from today "Non healed radial head fracture with mild impaction, stable."        1. Closed nondisplaced fracture of head of left radius, initial encounter          Plan:         Improvement from last visit.   Continue gentle ROM elbow.   Sling for public for 1-2 more weeks.   No heavy lifting, pushing, pulling.   Norco for pain.    Orders Placed This Encounter    Ambulatory referral/consult to Occupational Therapy    HYDROcodone-acetaminophen (NORCO) 5-325 mg per tablet     Follow up in about 2 weeks (around 2/20/2020) for repeat xrays.        "

## 2020-02-14 ENCOUNTER — CLINICAL SUPPORT (OUTPATIENT)
Dept: REHABILITATION | Facility: HOSPITAL | Age: 55
End: 2020-02-14
Payer: MEDICAID

## 2020-02-14 DIAGNOSIS — M25.522 PAIN IN LEFT ELBOW: ICD-10-CM

## 2020-02-14 DIAGNOSIS — S52.125A CLOSED NONDISPLACED FRACTURE OF HEAD OF LEFT RADIUS, INITIAL ENCOUNTER: ICD-10-CM

## 2020-02-14 DIAGNOSIS — M25.60 RANGE OF MOTION DEFICIT: ICD-10-CM

## 2020-02-14 PROCEDURE — 97530 THERAPEUTIC ACTIVITIES: CPT

## 2020-02-14 PROCEDURE — 97165 OT EVAL LOW COMPLEX 30 MIN: CPT

## 2020-02-14 NOTE — PLAN OF CARE
[facility] Occupational Therapy  Initial Evaluation     Name: Trudi Howard  Clinic Number: [id number]    Therapy Diagnosis:   Encounter Diagnoses   Name Primary?    Closed nondisplaced fracture of head of left radius, initial encounter     Pain in left elbow     Range of motion deficit      Physician: Sandra Hebert PA-C    Physician Orders: Eval and treat on 2/6/2020  Continue gentle ROM elbow.   Sling for public for 1-2 more weeks.   No heavy lifting, pushing, pulling.     Medical Diagnosis: S52.125A (ICD-10-CM) - Closed nondisplaced fracture of head of left radius, initial encounter   Surgery Date and Procedure: N/A  Injury Date: 1/20/2020  Evaluation Date: 2/14/2020  Insurance: Medicaid  Insurance Authorization period Expiration: 02/05/2021     Plan of Care Certification Period: 2/14/2020 to 4/14/2020    Visit # / Visits Authortized: 1/ 1  Time In:10:30 AM  Time Out: 11:30 AM  Total Billable Time: 60 minutes    Precautions: Weightbearing, epilepsy    Subjective     Involved Side: left  Dominant Side: Left  Date of Onset: 1/20/2020  Mechanism of Injury: fell  History of Current Condition: Pt was given a sling to wear in public.  Imaging: X-ray 2/6/2020  Impression      Non healed radial head fracture with mild impaction, stable.      Previous Therapy: yes    Patient's Goals for Therapy: Regain full use of left arm    Pain:  Functional Pain Scale Rating 0-10:   7/10 on average  5/10 at best  8/10 at worst  Location: left elbow  Description: Aching and Sharp  Aggravating Factors: Flexing  Easing Factors: rest    Occupation:  disability  Working presently: disability  Duties: household chores    Functional Limitations/Social History:    Previous functional status includes: Independent with all ADLs.     Current FunctionalStatus   Home/Living environment : lives with their family      Limitation of Functional Status as follows:   ADLs/IADLs:     - Feeding:Modified independent    -  Bathing: Modified Independent    - Dressing/Grooming: Modified Independent    - Driving: Unable     Leisure: none noted      Past Medical History/Physical Systems Review:   Trudi Howard  has a past medical history of History of uterine fibroid, Intra-abdominal abscess, Morbid obesity, RUSSELL (obstructive sleep apnea), Ovarian cyst, and Seizures.    Trudi Howard  has a past surgical history that includes Breast surgery;  section; fibroidectomy; Exploratory Laparotomy w/ Abscess Drainage (2017); Bunionectomy; and Incisional hernia repair (2019).    Trudi has a current medication list which includes the following prescription(s): acetazolamide, albuterol, cetirizine, ethosuximide, hydrocodone-acetaminophen, phenobarbital, and tramadol.    Review of patient's allergies indicates:   Allergen Reactions    Aspirin      Contraindicated with other meds    Amoxicillin      rash    Milk containing products     Peanut           Objective   Observation/Appearance:  Skin intact and no bruising noted.        Edema. Measured in centimeters.   2020    Left Right   2in. Above elbow 31 30   2in. Below elbow 29.5 28.7   Proximal Wrist Crease 35 33     Elbow AROM: LUE  Elbow ext/flex: 15/128  Sup/pron: 60/90    Sensation: Intact       Strength (Dyanmometer) and Pinch Strength (Pinch Gauge)  Measured in pounds and psi. Average of three trials.   2020    Left Right   Rung II def def           CMS Impairment/Limitation/Restriction for FOTO Elbow Survey    Therapist reviewed FOTO scores for Trudi Howard on 2020.   FOTO documents entered into [employer] - see Media section.    Limitation Score: 75%  Category: Carrying    Current : CL = least 60% but < 80% impaired, limited or restricted  Goal: CK = at least 40% but < 60% impaired, limited or restricted           Treatment     Treatment Time In: 10:30 AM  Treatment Time Out: 11:30 AM  Total Treatment time separate  from Evaluation time:10 minutes    Trudi received the following supervised modalities after being cleared for contradictions for 15 minutes:   -Patient received MH x 15 min to LUE to increase blood flow, circulation and tissue elasticity prior to therex    Trudi participated in dynamic functional therapeutic activities to improve functional performance for 10  minutes, including:  -AROM as follows: elbow ext/flex 3 ways, sup/pron x 10 reps each    Home Exercise Program/Education:  Issued HEP (see patient instructions in EMR) and educated on modality use for pain management . Exercises were reviewed and Trudi was able to demonstrate them prior to the end of the session.   Pt received a written copy of exercises to perform at home. Trudi demonstrated good  understanding of the education provided.  Pt was advised to perform these exercises free of pain, and to stop performing them if pain occurs.    Patient/Family Education: role of OT, goals for OT, scheduling/cancellations - pt verbalized understanding. Discussed insurance limitations with patient.    Additional Education provided: none    Assessment     Trudi Howard is a 54 y.o. female referred to outpatient occupational/hand therapy and presents with a medical diagnosis of left radial head fx, resulting in pain, decreased range of motion, decreased strength, decreased functional use of LUE and demonstrates limitations as described in the chart below. Following a brief medical record review it is determined that pt will benefit from occupational therapy services in order to maximize pain free and/or functional use of left upper extremity.     The patient's rehab potential is Good.     Anticipated barriers to occupational therapy: attention span, compliancy with HEP  Pt has no cultural, educational or language barriers to learning provided.    Profile and History Assessment of Occupational Performance Level of Clinical Decision Making Complexity Score    Occupational Profile:   Trudi Howard is a 54 y.o. female who lives with their family and is currently a home-maker. Trudi Howard has difficulty with  feeding, bathing, grooming and dressing,housework/household chores  affecting his/her daily functional abilities. His/her main goal for therapy is to regain full use of LUE.     Comorbidities:    has a past medical history of History of uterine fibroid, Intra-abdominal abscess, Morbid obesity, RUSSELL (obstructive sleep apnea), Ovarian cyst, and Seizures.        Medical and Therapy History Review:   Brief               Performance Deficits    Physical:  Joint Mobility  Joint Stability  Muscle Power/Strength  Muscle Endurance  Edema   Strength  Pain    Cognitive:  Attention    Psychosocial:    Social Interaction  (needs redirection to tasks on hand)   Clinical Decision Making:  low    Assessment Process:  Problem-Focused Assessments    Modification/Need for Assistance:  Not Necessary    Intervention Selection:  Limited Treatment Options       low  Based on PMHX, co morbidities , data from assessments and functional level of assistance required with task and clinical presentation directly impacting function.       The following goals were discussed with the patient and patient is in agreement with them as to be addressed in the treatment plan.     Goals:   Short Term (4 weeks on 3/14/2020):  1)   Patient to be IND with HEP and modalities for pain managemen.t  2)   Increase ROM 5-10 degrees for sup and elbow ext/flex  to increase functional hand use for reaching activities.  3)   Measure  in 2 weeks.  4)   Decrease edema .2-.3 cm to increase joint mobility /flexibility for improved overall functional hand use.       Long Term (by discharge):  1)   Pt will report 1 out of 10 pain with LUE use for ADLs.  2)   Patient to score at CK on FOTO to demonstrate improved perception of functional LUE use.  3)   Pt will return to prior level of function for ADLs  and household management.       Plan   Certification Period/Plan of care expiration: 2/14/2020 to 4/14/2020.    Outpatient Occupational Therapy 2 times weekly for 8 weeks may include the following interventions: Manual therapy/joint mobilizations, Modalities for pain management, Therapeutic exercises/activities., Strengthening and Edema Control.      Day Massey, OT

## 2020-02-18 ENCOUNTER — CLINICAL SUPPORT (OUTPATIENT)
Dept: REHABILITATION | Facility: HOSPITAL | Age: 55
End: 2020-02-18
Payer: MEDICAID

## 2020-02-18 DIAGNOSIS — S52.125A CLOSED NONDISPLACED FRACTURE OF HEAD OF LEFT RADIUS, INITIAL ENCOUNTER: Primary | ICD-10-CM

## 2020-02-18 PROCEDURE — 97110 THERAPEUTIC EXERCISES: CPT

## 2020-02-18 PROCEDURE — 97022 WHIRLPOOL THERAPY: CPT

## 2020-02-19 NOTE — PROGRESS NOTES
Occupational Therapy Daily Treatment Note   Date 2/18/2020  Name: Trudi Howard  Clinic Number: [id number]     Therapy Diagnosis:        Encounter Diagnoses   Name Primary?    Closed nondisplaced fracture of head of left radius, initial encounter      Pain in left elbow      Range of motion deficit        Physician: Sandra Hebert PA-C     Physician Orders: Eval and treat on 2/6/2020  Continue gentle ROM elbow.   Sling for public for 1-2 more weeks.   No heavy lifting, pushing, pulling.      Medical Diagnosis: S52.125A (ICD-10-CM) - Closed nondisplaced fracture of head of left radius, initial encounter   Surgery Date and Procedure: N/A  Injury Date: 1/20/2020  Evaluation Date: 2/14/2020  Insurance: Medicaid  Insurance Authorization period Expiration: 02/05/2021      Plan of Care Certification Period: 2/14/2020 to 4/14/2020     Visit # / Visits Authortized: 2/ 1  Time In:2:00 pm   Time Out: 2:40pm   Total Billable Time: 60 minutes     Precautions: Weightbearing, epilepsy         Subjective     Pt reports: she was compliant with home exercise program given last session.   Response to previous treatment: increased flexion, today she can now touch her shoulder   Functional change: inceased elbow flexion to touch her shoulder     Pain: 3/10  Location: left elbow    Involved Side: left  Dominant Side: Left  Date of Onset: 1/20/2020  Mechanism of Injury: fell  History of Current Condition: Pt was given a sling to wear in public.  Imaging: X-ray 2/6/2020  Impression      Non healed radial head fracture with mild impaction, stable.       Objective   Edema. Measured in centimeters.    2/14/2020 2/14/2020     Left Right   2in. Above elbow 31 30   2in. Below elbow 29.5 28.7   Proximal Wrist Crease   35 33        Elbow AROM: LUE  Elbow ext/flex: 15/128  Sup/pron: 60/90      Strength (Dyanmometer) and Pinch Strength (Pinch Gauge)  Measured in pounds and psi. Average of three trials.     2/14/2020 2/14/2020     Left Right   Rung II def def      Trudi received the following supervised modalities after being cleared for contradictions for 15 minutes:   -Patient received MH x 15 min to LUE to increase blood flow, circulation and tissue elasticity prior to therex     Trudi participated in dynamic functional therapeutic activities to improve functional performance for 16 minutes, including:  -AROM as follows: elbow ext/flex 3 ways, sup/pron x 10 reps each   3 trials of elbow flexion with yellow clothes pins to hook on right shoulder      Home Exercise Program/Education:  Issued HEP (see patient instructions in EMR) and educated on modality use for pain management . Exercises were reviewed and Trudi was able to demonstrate them prior to the end of the session        Home Exercises and Education Provided     Education provided:   -  - Progress towards goals     Written Home Exercises Provided: Patient instructed to cont prior HEP.  Exercises were reviewed and Trudi was able to demonstrate them prior to the end of the session.  Trudi demonstrated good  understanding of the education provided.   .   See EMR under Patient Instructions for exercises provided prior visit.       Assessment     Pt would continue to benefit from skilled OT. Yes      Trudi is progressing well towards her goals and there are no updates to goals at this time. Pt prognosis is Good.     Pt will continue to benefit from skilled outpatient occupational therapy to address the deficits listed in the problem list on initial evaluation provide pt/family education and to maximize pt's level of independence in the home and community environment.     Anticipated barriers to occupational therapy:  None     Pt's spiritual, cultural and educational needs considered and pt agreeable to plan of care and goals.      Goals:   Short Term (4 weeks on 3/14/2020):  1)   Patient to be IND with HEP and modalities for pain managemen.t  2)   Increase ROM 5-10  degrees for sup and elbow ext/flex  to increase functional hand use for reaching activities.  3)   Measure  in 2 weeks.  4)   Decrease edema .2-.3 cm to increase joint mobility /flexibility for improved overall functional hand use.         Long Term (by discharge):  1)   Pt will report 1 out of 10 pain with LUE use for ADLs.  2)   Patient to score at CK on FOTO to demonstrate improved perception of functional LUE use.  3)   Pt will return to prior level of function for ADLs and household management.         Plan   Certification Period/Plan of care expiration: 2/14/2020 to 4/14/2020.     Outpatient Occupational Therapy 2 times weekly for 8 weeks may include the following interventions: Manual therapy/joint mobilizations, Modalities for pain management, Therapeutic exercises/activities., Strengthening and Edema Control.     Discussed Plan of Care with patient: Yes  Updates/Grading for next session: continu LUZ MARINA Chiu, OT

## 2020-02-27 ENCOUNTER — CLINICAL SUPPORT (OUTPATIENT)
Dept: REHABILITATION | Facility: HOSPITAL | Age: 55
End: 2020-02-27
Payer: MEDICAID

## 2020-02-27 DIAGNOSIS — S52.125A CLOSED NONDISPLACED FRACTURE OF HEAD OF LEFT RADIUS, INITIAL ENCOUNTER: Primary | ICD-10-CM

## 2020-02-27 PROCEDURE — 97110 THERAPEUTIC EXERCISES: CPT

## 2020-02-27 NOTE — PROGRESS NOTES
Occupational Therapy Daily Treatment Note   Date 2/27/2020  Name: Trudi Howard  Clinic Number: [id number]     Therapy Diagnosis:        Encounter Diagnoses   Name Primary?    Closed nondisplaced fracture of head of left radius, initial encounter      Pain in left elbow      Range of motion deficit        Physician: Sandra Hebert PA-C     Physician Orders: Eval and treat on 2/6/2020  Continue gentle ROM elbow.   Sling for public for 1-2 more weeks.   No heavy lifting, pushing, pulling.      Medical Diagnosis: S52.125A (ICD-10-CM) - Closed nondisplaced fracture of head of left radius, initial encounter   Surgery Date and Procedure: N/A  Injury Date: 1/20/2020  Evaluation Date: 2/14/2020  Insurance: Medicaid  Insurance Authorization period Expiration: 02/05/2021    return to MD 3/5/20  Plan of Care Certification Period: 2/14/2020 to 4/14/2020     Visit # / Visits Authortized: 3/ 1  Time In:1;30  pm   Time Out: 2:00 pm   Total Billable Time: 30  minutes     Precautions: Weightbearing, epilepsy         Subjective     Pt reports: she was compliant with home exercise program given last session. She has excellent range of motion she does not see the MD til 3/5/ 20   Response to previous treatment: increased flexion, today she can now touch her shoulder   Functional change: inceased elbow flexion to touch her shoulder     Pain: 3/10  Location: left elbow    Involved Side: left  Dominant Side: Left  Date of Onset: 1/20/2020  Mechanism of Injury: fell  History of Current Condition: Pt was given a sling to wear in public.  Imaging: X-ray 2/6/2020  Impression      Non healed radial head fracture with mild impaction, stable.       Objective   Edema. Measured in centimeters.    2/14/2020 2/14/2020     Left Right   2in. Above elbow 31 30   2in. Below elbow 29.5 28.7   Proximal Wrist Crease   35 33        Elbow AROM: LUE  Elbow ext/flex: 15/128  Sup/pron: 60/90      Strength  (Dyanmometer) and Pinch Strength (Pinch Gauge)  Measured in pounds and psi. Average of three trials.    2/14/2020 2/14/2020     Left Right   Rung II def def      Trudi received the following supervised modalities after being cleared for contradictions for 15 minutes:   -Patient received MH x 15 min to LUE to increase blood flow, circulation and tissue elasticity prior to therex     Trudi participated in dynamic functional therapeutic activities to improve functional performance for 16 minutes, including:  -AROM as follows: elbow ext/flex 3 ways, sup/pron x 10 reps each   3 trials of elbow flexion with yellow clothes pins to hook on right shoulder      Home Exercise Program/Education:  Issued HEP (see patient instructions in EMR) and educated on modality use for pain management . Exercises were reviewed and Trudi was able to demonstrate them prior to the end of the session        Home Exercises and Education Provided     Education provided:   -  - Progress towards goals     Written Home Exercises Provided: Patient instructed to cont prior HEP.  Exercises were reviewed and Trudi was able to demonstrate them prior to the end of the session.  Trudi demonstrated good  understanding of the education provided.   .   See EMR under Patient Instructions for exercises provided prior visit.       Assessment     Pt would continue to benefit from skilled OT. Yes      Trudi is progressing well towards her goals and there are no updates to goals at this time. Pt prognosis is Good.     Pt will continue to benefit from skilled outpatient occupational therapy to address the deficits listed in the problem list on initial evaluation provide pt/family education and to maximize pt's level of independence in the home and community environment.     Anticipated barriers to occupational therapy:  None     Pt's spiritual, cultural and educational needs considered and pt agreeable to plan of care and goals.      Goals:   Short Term (4 weeks on  3/14/2020):  1)   Patient to be IND with HEP and modalities for pain managemen.t  2)   Increase ROM 5-10 degrees for sup and elbow ext/flex  to increase functional hand use for reaching activities.  3)   Measure  in 2 weeks.  4)   Decrease edema .2-.3 cm to increase joint mobility /flexibility for improved overall functional hand use.         Long Term (by discharge):  1)   Pt will report 1 out of 10 pain with LUE use for ADLs.  2)   Patient to score at CK on FOTO to demonstrate improved perception of functional LUE use.  3)   Pt will return to prior level of function for ADLs and household management.         Plan   Certification Period/Plan of care expiration: 2/14/2020 to 4/14/2020.     Outpatient Occupational Therapy 2 times weekly for 8 weeks may include the following interventions: Manual therapy/joint mobilizations, Modalities for pain management, Therapeutic exercises/activities., Strengthening and Edema Control.     Discussed Plan of Care with patient: Yes  Updates/Grading for next session: continu ROM       Estella Chiu, OT

## 2020-03-02 ENCOUNTER — TELEPHONE (OUTPATIENT)
Dept: ORTHOPEDICS | Facility: CLINIC | Age: 55
End: 2020-03-02

## 2020-03-02 NOTE — TELEPHONE ENCOUNTER
----- Message from Luigi Cotto sent at 3/2/2020  8:48 AM CST -----  Contact: Pt  Pt called and would like someone to call her back.    She has an appt scheduled on 3/5/20 and would like to reschedule but there are no availabilities afterwards    Pt can be reached at [phone]

## 2020-03-04 ENCOUNTER — CLINICAL SUPPORT (OUTPATIENT)
Dept: REHABILITATION | Facility: HOSPITAL | Age: 55
End: 2020-03-04
Payer: MEDICAID

## 2020-03-04 DIAGNOSIS — M25.522 PAIN IN LEFT ELBOW: ICD-10-CM

## 2020-03-04 DIAGNOSIS — M25.60 RANGE OF MOTION DEFICIT: ICD-10-CM

## 2020-03-04 PROCEDURE — 97530 THERAPEUTIC ACTIVITIES: CPT

## 2020-03-04 NOTE — PROGRESS NOTES
Occupational Therapy Daily Treatment Note   Date 3/4/2020  Name: Trudi Howard  Clinic Number: [id number]     Therapy Diagnosis:        Encounter Diagnoses   Name Primary?    Closed nondisplaced fracture of head of left radius, initial encounter      Pain in left elbow      Range of motion deficit        Physician: Sandra Hebert PA-C     Physician Orders: Eval and treat on 2/6/2020  Continue gentle ROM elbow.   Sling for public for 1-2 more weeks.   No heavy lifting, pushing, pulling.      Medical Diagnosis: S52.125A (ICD-10-CM) - Closed nondisplaced fracture of head of left radius, initial encounter   Surgery Date and Procedure: N/A  Injury Date: 1/20/2020  Evaluation Date: 2/14/2020  Insurance: Medicaid  Insurance Authorization period Expiration: 02/05/2021    return to MD 3/5/20  Plan of Care Certification Period: 2/14/2020 to 4/14/2020     Visit # / Visits Authortized: 2/20  Visit #4  Time In:11:30  am   Time Out: 12:15 pm   Total Billable Time: 30  minutes     Precautions: Weightbearing, epilepsy         Subjective     Pt reports: she was compliant with home exercise program given last session. She has excellent range of motion she does not see the MD til 3/12/ 20   Response to previous treatment: increased flexion, today she can now touch her shoulder   Functional change: inceased elbow flexion to touch her shoulder     Pain: 3/10  Location: left elbow    Involved Side: left  Dominant Side: Left  Date of Onset: 1/20/2020  Mechanism of Injury: fell  History of Current Condition: Pt was given a sling to wear in public.  Imaging: X-ray 2/6/2020  Impression      Non healed radial head fracture with mild impaction, stable.       Objective   Edema. Measured in centimeters.    2/14/2020 2/14/2020 3/4/2020     Left Right Left   2in. Above elbow 31 30 31   2in. Below elbow 29.5 28.7 29   2 in above elbow   35 33 35        Elbow AROM: LUE  Elbow ext/flex: 0/140  Sup/pron: 80/90       Strength (Dyanmometer) and Pinch Strength (Pinch Gauge)  Measured in pounds and psi. Average of three trials.    2/14/2020 2/14/2020     Left Right   Rung II def def      Trudi received the following supervised modalities after being cleared for contradictions for 15 minutes:   -Patient received MH x 15 min to LUE to increase blood flow, circulation and tissue elasticity prior to therex     Trudi participated in dynamic functional therapeutic activities to improve functional performance for 30 minutes, including:  -AROM as follows: elbow ext/flex 3 ways, sup/pron x 10 reps each   3 trials of elbow flexion with clothes pins to hook on right shoulder   UBE 2 1/2 minutes forward, 2 1/2 minutes reverse     Home Exercise Program/Education:  Issued HEP (see patient instructions in EMR) and educated on modality use for pain management . Exercises were reviewed and Trudi was able to demonstrate them prior to the end of the session        Home Exercises and Education Provided     Education provided:   -non today  - Progress towards goals: Excellent     Written Home Exercises Provided: Patient instructed to cont prior HEP.  Exercises were reviewed and Trudi was able to demonstrate them prior to the end of the session.  Trudi demonstrated good  understanding of the education provided.   .   See EMR under Patient Instructions for exercises provided prior visit.       Assessment   Advanced light activities with mild difficulty. AROM of her elbow is WNL.     Pt would continue to benefit from skilled OT. Yes      Trudi is progressing well towards her goals and there are no updates to goals at this time. Pt prognosis is Good.     Pt will continue to benefit from skilled outpatient occupational therapy to address the deficits listed in the problem list on initial evaluation provide pt/family education and to maximize pt's level of independence in the home and community environment.     Anticipated barriers to occupational therapy:   None     Pt's spiritual, cultural and educational needs considered and pt agreeable to plan of care and goals.      Goals:   Short Term (4 weeks on 3/14/2020):  1)   Patient to be IND with HEP and modalities for pain management.- Met 3/4/2020  2)   Increase ROM 5-10 degrees for sup and elbow ext/flex  to increase functional hand use for reaching activities.- Met 3/4/2020  3)   Measure  in 2 weeks.-progressing  4)   Decrease edema .2-.3 cm to increase joint mobility /flexibility for improved overall functional hand use. -progressing        Long Term (by discharge):  1)   Pt will report 1 out of 10 pain with LUE use for ADLs.-progressing  2)   Patient to score at CK on FOTO to demonstrate improved perception of functional LUE use.-progressing  3)   Pt will return to prior level of function for ADLs and household management. -progressing        Plan   Certification Period/Plan of care expiration: 2/14/2020 to 4/14/2020.     Outpatient Occupational Therapy 2 times weekly for 8 weeks may include the following interventions: Manual therapy/joint mobilizations, Modalities for pain management, Therapeutic exercises/activities., Strengthening and Edema Control.     Discussed Plan of Care with patient: Yes  Updates/Grading for next session: Continue with light activities until follow-up with MD.      Day Massey, OT

## 2020-03-11 ENCOUNTER — TELEPHONE (OUTPATIENT)
Dept: ORTHOPEDICS | Facility: CLINIC | Age: 55
End: 2020-03-11

## 2020-03-11 DIAGNOSIS — S52.125A CLOSED NONDISPLACED FRACTURE OF HEAD OF LEFT RADIUS, INITIAL ENCOUNTER: Primary | ICD-10-CM

## 2020-03-11 DIAGNOSIS — S52.125D CLOSED NONDISPLACED FRACTURE OF HEAD OF LEFT RADIUS WITH ROUTINE HEALING, SUBSEQUENT ENCOUNTER: ICD-10-CM

## 2020-03-12 ENCOUNTER — CLINICAL SUPPORT (OUTPATIENT)
Dept: REHABILITATION | Facility: HOSPITAL | Age: 55
End: 2020-03-12
Payer: MEDICAID

## 2020-03-12 ENCOUNTER — HOSPITAL ENCOUNTER (OUTPATIENT)
Dept: RADIOLOGY | Facility: HOSPITAL | Age: 55
Discharge: HOME | End: 2020-03-12
Attending: ORTHOPAEDIC SURGERY
Payer: MEDICAID

## 2020-03-12 ENCOUNTER — OFFICE VISIT (OUTPATIENT)
Dept: ORTHOPEDICS | Facility: CLINIC | Age: 55
End: 2020-03-12
Payer: MEDICAID

## 2020-03-12 VITALS — WEIGHT: 276 LBS | BODY MASS INDEX: 40.88 KG/M2 | HEIGHT: 69 IN

## 2020-03-12 DIAGNOSIS — M25.522 PAIN IN LEFT ELBOW: ICD-10-CM

## 2020-03-12 DIAGNOSIS — S52.125D CLOSED NONDISPLACED FRACTURE OF HEAD OF LEFT RADIUS WITH ROUTINE HEALING: ICD-10-CM

## 2020-03-12 DIAGNOSIS — M25.60 RANGE OF MOTION DEFICIT: ICD-10-CM

## 2020-03-12 DIAGNOSIS — S52.125D CLOSED NONDISPLACED FRACTURE OF HEAD OF LEFT RADIUS WITH ROUTINE HEALING, SUBSEQUENT ENCOUNTER: ICD-10-CM

## 2020-03-12 PROCEDURE — 73070 X-RAY EXAM OF ELBOW: CPT | Mod: TC,PN,LT

## 2020-03-12 PROCEDURE — 99213 PR OFFICE/OUTPT VISIT, EST, LEVL III, 20-29 MIN: ICD-10-PCS | Mod: S$PBB,,, | Performed by: ORTHOPAEDIC SURGERY

## 2020-03-12 PROCEDURE — 73070 XR ELBOW 2 VIEWS LEFT: ICD-10-PCS | Mod: 26,LT,, | Performed by: RADIOLOGY

## 2020-03-12 PROCEDURE — 99999 PR PBB SHADOW E&M-EST. PATIENT-LVL III: ICD-10-PCS | Mod: PBBFAC,,, | Performed by: ORTHOPAEDIC SURGERY

## 2020-03-12 PROCEDURE — 99213 OFFICE O/P EST LOW 20 MIN: CPT | Mod: PBBFAC,25,PN | Performed by: ORTHOPAEDIC SURGERY

## 2020-03-12 PROCEDURE — 73070 X-RAY EXAM OF ELBOW: CPT | Mod: 26,LT,, | Performed by: RADIOLOGY

## 2020-03-12 PROCEDURE — 99213 OFFICE O/P EST LOW 20 MIN: CPT | Mod: S$PBB,,, | Performed by: ORTHOPAEDIC SURGERY

## 2020-03-12 PROCEDURE — 97530 THERAPEUTIC ACTIVITIES: CPT

## 2020-03-12 PROCEDURE — 99999 PR PBB SHADOW E&M-EST. PATIENT-LVL III: CPT | Mod: PBBFAC,,, | Performed by: ORTHOPAEDIC SURGERY

## 2020-03-12 NOTE — PROGRESS NOTES
Subjective:      Patient ID: Trudi Howard is a 54 y.o. female.  Chief Complaint: Follow-up of the Left Arm      HPI  Trudi Howard is a  54 y.o. female presenting today for follow up of left radial head fracture minimally displaced.  She reports that she is doing well currently in therapy no pain reported she is about 7 weeks out from injury.    Review of patient's allergies indicates:   Allergen Reactions    Aspirin      Contraindicated with other meds    Amoxicillin      rash    Milk containing products     Peanut          Current Outpatient Medications   Medication Sig Dispense Refill    acetaZOLAMIDE (DIAMOX) 250 MG tablet   4    cetirizine (ZYRTEC) 10 MG tablet Take 1 tablet (10 mg total) by mouth once daily. 30 tablet 2    ethosuximide (ZARONTIN) 250 mg capsule Take 250 mg by mouth 2 (two) times daily.        HYDROcodone-acetaminophen (NORCO) 5-325 mg per tablet Take 1 tablet by mouth every 8 (eight) hours as needed for Pain. 30 tablet 0    phenobarbital (LUMINAL) 32.4 MG tablet 3 QAM AND 5 TS HS  5    traMADol (ULTRAM) 50 mg tablet Take 1 tablet (50 mg total) by mouth every 4 (four) hours as needed for Pain. 40 tablet 0    albuterol (VENTOLIN HFA) 90 mcg/actuation inhaler Inhale 2 puffs into the lungs every 6 (six) hours as needed for Wheezing. Rescue 18 g 0     No current facility-administered medications for this visit.        Past Medical History:   Diagnosis Date    History of uterine fibroid     Intra-abdominal abscess 2017    Urinary retention, ruptured bladder, infected urinoma, sepsis    Morbid obesity     RUSSELL (obstructive sleep apnea)     Ovarian cyst 2017    Seizures        Past Surgical History:   Procedure Laterality Date    BREAST SURGERY      BUNIONECTOMY       SECTION      Exploratory Laparotomy w/ Abscess Drainage  2017        fibroidectomy      INCISIONAL HERNIA REPAIR  2019    Laparoscopic-        OBJECTIVE:  "  PHYSICAL EXAM:  Height: 5' 9" (175.3 cm) Weight: 125.2 kg (276 lb)  Vitals:    03/12/20 1403   Weight: 125.2 kg (276 lb)   Height: 5' 9" (1.753 m)   PainSc:   4     Ortho/SPM Exam  Examination left elbow fracture site nontender full range of motion including pronation supination without pain strength is little bit weak    RADIOGRAPHS:  AP and lateral x-ray left elbow demonstrate healed radial head fracture with slight angulation  Comments: I have personally reviewed the imaging and I agree with the above radiologist's report.    ASSESSMENT/PLAN:     IMPRESSION:  Left radial head fracture    PLAN:  I would like her to continue to therapy to work on strengthening  Still avoid weight-bearing on the arm  Pennsaid topical anti-inflammatory cream for the elbow and shoulder were she is having some pain in the shoulder as well    FOLLOW UP:  4-6 weeks    Disclaimer: This note has been generated using voice-recognition software. There may be typographical errors that have been missed during proof-reading.  "

## 2020-03-12 NOTE — PROGRESS NOTES
Occupational Therapy Daily Treatment Note   Date 3/12/2020  Name: Trudi Howard  Clinic Number: [id number]     Therapy Diagnosis:        Encounter Diagnoses   Name Primary?    Closed nondisplaced fracture of head of left radius, initial encounter      Pain in left elbow      Range of motion deficit        Physician: Sandra Hebert PA-C     Physician Orders: Eval and treat on 2/6/2020  Continue gentle ROM elbow.   Sling for public for 1-2 more weeks.   No heavy lifting, pushing, pulling.      Medical Diagnosis: S52.125A (ICD-10-CM) - Closed nondisplaced fracture of head of left radius, initial encounter   Surgery Date and Procedure: N/A  Injury Date: 1/20/2020  Evaluation Date: 2/14/2020  Insurance: Medicaid  Insurance Authorization period Expiration: 02/05/2021    return to MD 3/5/20  Plan of Care Certification Period: 2/14/2020 to 4/14/2020     Visit # / Visits Authortized: 4/20  Visit #5  Time In:10:30  am   Time Out: 1118 am   Total Billable Time: 38  minutes     Precautions: Weightbearing, epilepsy         Subjective     Pt reports: She reports she is having a little pain because she used her arm to put her hair in a shower cap.    she was compliant with home exercise program given last session. She has excellent range of motion   Response to previous treatment: increased flexion, today she can now touch her shoulder   Functional change: able to put hair up in shower cap    Pain: 3/10  Location: left elbow    Involved Side: left  Dominant Side: Left  Date of Onset: 1/20/2020  Mechanism of Injury: fell  History of Current Condition: Pt was given a sling to wear in public.  Imaging: X-ray 2/6/2020  Impression      Non healed radial head fracture with mild impaction, stable.       Objective   Edema. Measured in centimeters.    2/14/2020 2/14/2020 3/4/2020     Left Right Left   2in. Above elbow 31 30 31   2in. Below elbow 29.5 28.7 29   2 in above elbow   35 33 35        Elbow  AROM: LUE  Elbow ext/flex: 0/140  Sup/pron: 80/90      Strength (Dyanmometer) and Pinch Strength (Pinch Gauge)  Measured in pounds and psi. Average of three trials.    2/14/2020 2/14/2020     Left Right   Rung II def def      Trudi received the following supervised modalities after being cleared for contradictions for 15 minutes:   -Patient received MH x 15 min to LUE to increase blood flow, circulation and tissue elasticity prior to therex    Manual therapy- STM to FA and elbow x 5 min     Trudi participated in dynamic functional therapeutic activities to improve functional performance for 33 minutes, including:  -AROM as follows: elbow ext/flex 3 ways, sup/pron x 10 reps each   3 trials of elbow flexion with clothes pins to hook on right shoulder , trial on Left shoulder  UBE 2 1/2 minutes forward, 2 1/2 minutes reverse     Home Exercise Program/Education:  Issued HEP (see patient instructions in EMR) and educated on modality use for pain management . Exercises were reviewed and Trudi was able to demonstrate them prior to the end of the session        Home Exercises and Education Provided     Education provided:   -non today  - Progress towards goals: Excellent     Written Home Exercises Provided: Patient instructed to cont prior HEP.  Exercises were reviewed and Trudi was able to demonstrate them prior to the end of the session.  Trudi demonstrated good  understanding of the education provided.   .   See EMR under Patient Instructions for exercises provided prior visit.       Assessment   Advanced light activities with mild difficulty. AROM of her elbow is WNL. She reports increased functional use this date.     Pt would continue to benefit from skilled OT. Yes      Trudi is progressing well towards her goals and there are no updates to goals at this time. Pt prognosis is Good.     Pt will continue to benefit from skilled outpatient occupational therapy to address the deficits listed in the problem list on initial  evaluation provide pt/family education and to maximize pt's level of independence in the home and community environment.     Anticipated barriers to occupational therapy:  None     Pt's spiritual, cultural and educational needs considered and pt agreeable to plan of care and goals.      Goals:   Short Term (4 weeks on 3/14/2020):  1)   Patient to be IND with HEP and modalities for pain management.- Met 3/4/2020  2)   Increase ROM 5-10 degrees for sup and elbow ext/flex  to increase functional hand use for reaching activities.- Met 3/4/2020  3)   Measure  in 2 weeks.-progressing  4)   Decrease edema .2-.3 cm to increase joint mobility /flexibility for improved overall functional hand use. -progressing        Long Term (by discharge):  1)   Pt will report 1 out of 10 pain with LUE use for ADLs.-progressing  2)   Patient to score at CK on FOTO to demonstrate improved perception of functional LUE use.-progressing  3)   Pt will return to prior level of function for ADLs and household management. -progressing        Plan   Certification Period/Plan of care expiration: 2/14/2020 to 4/14/2020.     Outpatient Occupational Therapy 2 times weekly for 8 weeks may include the following interventions: Manual therapy/joint mobilizations, Modalities for pain management, Therapeutic exercises/activities., Strengthening and Edema Control.     Discussed Plan of Care with patient: Yes  Updates/Grading for next session: Continue with light activities until follow-up with MD.      Danica Slater, OTR/L,CHT

## 2020-03-16 ENCOUNTER — CLINICAL SUPPORT (OUTPATIENT)
Dept: REHABILITATION | Facility: HOSPITAL | Age: 55
End: 2020-03-16
Payer: MEDICAID

## 2020-03-16 DIAGNOSIS — M25.60 RANGE OF MOTION DEFICIT: ICD-10-CM

## 2020-03-16 DIAGNOSIS — M25.522 PAIN IN LEFT ELBOW: ICD-10-CM

## 2020-03-16 PROCEDURE — 97530 THERAPEUTIC ACTIVITIES: CPT

## 2020-03-16 NOTE — PROGRESS NOTES
Occupational Therapy Daily Treatment Note   Date 3/16/2020  Name: Trudi Howard  Clinic Number: [id number]     Therapy Diagnosis:        Encounter Diagnoses   Name Primary?    Closed nondisplaced fracture of head of left radius, initial encounter      Pain in left elbow      Range of motion deficit        Physician: Sandra Hebert PA-C     Physician Orders: Eval and treat on 2/6/2020  Continue gentle ROM elbow.   Sling for public for 1-2 more weeks.   No heavy lifting, pushing, pulling.      Medical Diagnosis: S52.125A (ICD-10-CM) - Closed nondisplaced fracture of head of left radius, initial encounter   Surgery Date and Procedure: N/A  Injury Date: 1/20/2020  Evaluation Date: 2/14/2020  Insurance: Medicaid  Insurance Authorization period Expiration: 02/05/2021    return to MD 3/5/20  Plan of Care Certification Period: 2/14/2020 to 4/14/2020     Visit # / Visits Authortized: 5/20  Visit #6  Time In:115pm   Time Out: 200pm   Total Billable Time: 45    minutes     Precautions: Weightbearing, epilepsy, can start strengthening         Subjective     Pt reports: She reports she is having a little pain because she was able to do some chores and wash dishes. She requests to not start strengthening because she is having increased pain. She tried driving and she had pain with putting seat belt on.        she was compliant with home exercise program given last session. She has excellent range of motion   Response to previous treatment: increased flexion, today she can now touch her shoulder   Functional change: able to wash dishes and do some house chores but with increase pain     Pain: 3-5/10  Location: left elbow    Involved Side: left  Dominant Side: Left  Date of Onset: 1/20/2020  Mechanism of Injury: fell.  Imaging: X-ray 2/6/2020  Impression      Non healed radial head fracture with mild impaction, stable.       Objective   Edema. Measured in centimeters.    2/14/2020 2/14/2020  3/4/2020     Left Right Left   2in. Above elbow 31 30 31   2in. Below elbow 29.5 28.7 29   2 in above elbow   35 33 35        Elbow AROM: LUE  Elbow ext/flex: 0/140  Sup/pron: 80/90      Strength (Dyanmometer) and Pinch Strength (Pinch Gauge)  Measured in pounds and psi. Average of three trials.    2/14/2020 2/14/2020     Left Right   Rung II def def      Trudi received the following supervised modalities after being cleared for contradictions for 10 minutes:   -Patient received MH x 10 min to LUE to increase blood flow, circulation and tissue elasticity prior to therex    Manual therapy- STM to FA and elbow x 5 min     Trudi participated in dynamic functional therapeutic activities to improve functional performance for 35 minutes, including:  -AROM as follows: elbow ext/flex 3 ways, sup/pron x 10 reps each   -3 trials of elbow flexion with clothes pins to hook  on Left shoulder  - yellow theraputty- 3 min twirl, 10 squeezes, x 5 pancake and blooms, x 3 logs 3 pt pinch, x 3 logs lat pinch  -UBE 2 1/2 minutes forward, 2 1/2 minutes reverse     Home Exercise Program/Education:  Issued HEP (see patient instructions in EMR) and educated on modality use for pain management . Exercises were reviewed and Trudi was able to demonstrate them prior to the end of the session        Home Exercises and Education Provided     Education provided:   -non today  - Progress towards goals: Excellent     Written Home Exercises Provided: yes.  Exercises were reviewed and Trudi was able to demonstrate them prior to the end of the session.  Trudi demonstrated good  understanding of the education provided.   .   See EMR under Patient Instructions for exercises provided 03/16/2020.       Assessment   AROM of her elbow is WNL. She reports increased functional use this date. Added light theraputty strengthening with good participation. Pt. Instructed to bring back with her for next session.     Pt would continue to benefit from skilled OT. Yes       Trudi is progressing well towards her goals and there are no updates to goals at this time. Pt prognosis is Good.     Pt will continue to benefit from skilled outpatient occupational therapy to address the deficits listed in the problem list on initial evaluation provide pt/family education and to maximize pt's level of independence in the home and community environment.     Anticipated barriers to occupational therapy:  None     Pt's spiritual, cultural and educational needs considered and pt agreeable to plan of care and goals.      Goals:   Short Term (4 weeks on 3/14/2020):  1)   Patient to be IND with HEP and modalities for pain management.- Met 3/4/2020  2)   Increase ROM 5-10 degrees for sup and elbow ext/flex  to increase functional hand use for reaching activities.- Met 3/4/2020  3)   Measure  in 2 weeks.-progressing  4)   Decrease edema .2-.3 cm to increase joint mobility /flexibility for improved overall functional hand use. -progressing        Long Term (by discharge):  1)   Pt will report 1 out of 10 pain with LUE use for ADLs.-progressing  2)   Patient to score at CK on FOTO to demonstrate improved perception of functional LUE use.-progressing  3)   Pt will return to prior level of function for ADLs and household management. -progressing        Plan   Certification Period/Plan of care expiration: 2/14/2020 to 4/14/2020.     Outpatient Occupational Therapy 2 times weekly for 8 weeks may include the following interventions: Manual therapy/joint mobilizations, Modalities for pain management, Therapeutic exercises/activities., Strengthening and Edema Control.     Discussed Plan of Care with patient: Yes  Updates/Grading for next session: Continue with light activities until follow-up with MD.      Danica Slater, OTR/L,CHT

## 2020-03-16 NOTE — PATIENT INSTRUCTIONS
[facility]  PK ALANIZ OTD, OTR/L, CHT  OCCUPATIONAL THERAPIST, CERTIFIED HAND THERAPIST

## 2020-03-18 ENCOUNTER — TELEPHONE (OUTPATIENT)
Dept: REHABILITATION | Facility: HOSPITAL | Age: 55
End: 2020-03-18

## 2020-03-18 NOTE — TELEPHONE ENCOUNTER
Called pt to discuss postpone care. Pt. Wishes to be seen 1 more time to establish an updated HEP for strengthening. Therapist in agreement.   Danica Slater, OTD, OTR/L, CHT   Occupational therapist, Certified Hand Therapist

## 2020-03-19 ENCOUNTER — CLINICAL SUPPORT (OUTPATIENT)
Dept: REHABILITATION | Facility: HOSPITAL | Age: 55
End: 2020-03-19
Payer: MEDICAID

## 2020-03-19 DIAGNOSIS — M25.522 PAIN IN LEFT ELBOW: ICD-10-CM

## 2020-03-19 DIAGNOSIS — M25.60 RANGE OF MOTION DEFICIT: ICD-10-CM

## 2020-03-19 PROCEDURE — 97530 THERAPEUTIC ACTIVITIES: CPT

## 2020-03-19 NOTE — PROGRESS NOTES
Occupational Therapy Daily Treatment Note   Date 3/19/2020  Name: Trudi Howard  Clinic Number: [id number]     Therapy Diagnosis:        Encounter Diagnoses   Name Primary?    Closed nondisplaced fracture of head of left radius, initial encounter      Pain in left elbow      Range of motion deficit        Physician: Sandra Hebert PA-C     Physician Orders: Eval and treat on 2/6/2020  Continue gentle ROM elbow.   Sling for public for 1-2 more weeks.   No heavy lifting, pushing, pulling.      Medical Diagnosis: S52.125A (ICD-10-CM) - Closed nondisplaced fracture of head of left radius, initial encounter   Surgery Date and Procedure: N/A  Injury Date: 1/20/2020  Evaluation Date: 2/14/2020  Insurance: Medicaid  Insurance Authorization period Expiration: 02/05/2021    return to MD 3/5/20  Plan of Care Certification Period: 2/14/2020 to 4/14/2020     Visit # / Visits Authortized: 6/20  Visit #7  Time In:11:05 am   Time Out: 11:45 am   Total Billable Time:  25 minutes     Precautions: Weightbearing, epilepsy, can start strengthening         Subjective     Pt reports: She reports she is having medium pain in her elbow. Pt reports that she is doing all light tasks with left elbow.     she was compliant with home exercise program given last session. She has excellent range of motion   Response to previous treatment: performing all light ADLs with mild difficulty.  Functional change: performing all light ADLs with mild difficulty. Pt is driving now.  Pain: 7/10 at worst, 0/10 at best  Location: left elbow    Involved Side: left  Dominant Side: Left  Date of Onset: 1/20/2020  Mechanism of Injury: fell.  Imaging: X-ray 2/6/2020  Impression      Non healed radial head fracture with mild impaction, stable.       Objective   Edema. Measured in centimeters.    2/14/2020 2/14/2020 3/4/2020     Left Right Left   2in. Above elbow 31 30 31   2in. Below elbow 29.5 28.7 29   2 in above elbow   35  33 35        Elbow AROM: LUE  Elbow ext/flex: 0/140  Sup/pron: 80/90      Strength (Dyanmometer) and Pinch Strength (Pinch Gauge)  Measured in pounds and psi. Average of three trials.    2/14/2020 2/14/2020 3/19/2020 3/19/2020     Left Right Left Right   Rung II def def 50 27      Trudi received the following supervised modalities after being cleared for contradictions for 10 minutes:   -Patient received MH x 10 min to LUE to increase blood flow, circulation and tissue elasticity prior to therex    Manual therapy- STM to FA and elbow x 5 min     Trudi participated in dynamic functional therapeutic activities to improve functional performance for 25 minutes, including:  - elbow ext/flex 3 ways. sup/pron with yellow theraband x 15 reps each  - yellow theraputty- 3 min twirl, 10 squeezes, x 5 pancake and blooms, x 3 logs 3 pt pinch, x 3 logs lat pinch (NT - pt did not bring for therapy)    -UBE 2 1/2 minutes forward, 2 1/2 minutes reverse     Home Exercise Program/Education:  Issued yellow TB to start, then increase to orange in 2 weeks, then increase to green in 4 weeks.  Issued HEP (see patient instructions in EMR) and educated on modality use for pain management . Exercises were reviewed and Trudi was able to demonstrate them prior to the end of the session        Home Exercises and Education Provided     Education provided:   -Elbow TB PREs  - Progress towards goals: Excellent     Written Home Exercises Provided: yes.  Exercises were reviewed and Trudi was able to demonstrate them prior to the end of the session.  Trudi demonstrated good  understanding of the education provided.   .   See EMR under Patient Instructions for exercises provided 03/16/2020.       Assessment   Advanced pt with light strengthening exercises. She continues to complain of elbow pain over her right lateral aspect of her elbow. Pt instructed with HEP for now due to COVID 19.    Pt would continue to benefit from skilled OT. Yes      Trudi is  progressing well towards her goals and there are no updates to goals at this time. Pt prognosis is Good.     Pt will continue to benefit from skilled outpatient occupational therapy to address the deficits listed in the problem list on initial evaluation provide pt/family education and to maximize pt's level of independence in the home and community environment.     Anticipated barriers to occupational therapy:  None     Pt's spiritual, cultural and educational needs considered and pt agreeable to plan of care and goals.      Goals:   Short Term (4 weeks on 3/14/2020):  1)   Patient to be IND with HEP and modalities for pain management.- Met 3/4/2020  2)   Increase ROM 5-10 degrees for sup and elbow ext/flex  to increase functional hand use for reaching activities.- Met 3/4/2020  3)   Measure  in 2 weeks.-Met 3/19/2020  4)   Decrease edema .2-.3 cm to increase joint mobility /flexibility for improved overall functional hand use. -progressing        Long Term (by discharge):  1)   Pt will report 1 out of 10 pain with LUE use for ADLs.-progressing  2)   Patient to score at CK on FOTO to demonstrate improved perception of functional LUE use.-progressing  3)   Pt will return to prior level of function for ADLs and household management. -progressing        Plan: Pt's visits  postponed secondary to COVID and the will f/u with therapist via messaging or call as needed.   Certification Period/Plan of care expiration: 2/14/2020 to 4/14/2020.     Outpatient Occupational Therapy 2 times weekly for 8 weeks may include the following interventions: Manual therapy/joint mobilizations, Modalities for pain management, Therapeutic exercises/activities., Strengthening and Edema Control.     Discussed Plan of Care with patient: Yes  Updates/Grading for next session: Advance as tolerated.      Day Massey, OT

## 2020-03-19 NOTE — PATIENT INSTRUCTIONS
Elbow Extension: Resisted        With tubing wrapped around left fist and other end anchored, straighten elbow.  Repeat _10__ times per set. Do __1__ set per session. Do __1__ session per day.    Copyright © I. All rights reserved.   Elbow Flexion: Resisted        With tubing wrapped around left fist and other end secured under foot, curl arm up as far as possible.  Repeat __10__ times per set. Do __1__ set per session. Do _1___ session per day.    Copyright © I. All rights reserved.   Forearm Pronation: Resisted        With right palm up, stabilize forearm on thigh with other hand. Keep tubing to outside of hand and roll palm down as far as possible.  Repeat ___10_ times per set. Relax. Do _1___ set per session. Do __1__ session per day.    Copyright © [employer]. All rights reserved.   Forearm Supination: Resisted        With right palm down, stabilize forearm on thigh with other hand. Keep tubing to inside of hand and roll palm up as far as possible.  Repeat __10__ times per set. Relax. Do _1__ set per session. Do _1___ session per day.    Copyright © I. All rights reserved.

## 2020-04-02 ENCOUNTER — TELEPHONE (OUTPATIENT)
Dept: REHABILITATION | Facility: HOSPITAL | Age: 55
End: 2020-04-02

## 2020-04-02 NOTE — TELEPHONE ENCOUNTER
Checked on pt's status. Pt reports that she continues to improve. No questions regarding her HEP. Pt instructed to call or email therapist with any questions or concerns.    MARTHA Del Rio, JET

## 2020-04-14 ENCOUNTER — TELEPHONE (OUTPATIENT)
Dept: REHABILITATION | Facility: HOSPITAL | Age: 55
End: 2020-04-14

## 2020-04-14 NOTE — TELEPHONE ENCOUNTER
Called patient to check in and follow up on how they are doing with the OT home exercise program.   Spoke with patient directly.       Plan of care and home exercise program were reviewed and patient has what they need to continue therapy at home. All patient questions were answered. Also stated to patient that we are ready to do virtual visits and that once she gets the my chart frank that she can call to schedule her virtual visits . Pt very interested in doing virtual visits   Call us [phone]. .

## 2020-05-21 ENCOUNTER — TELEPHONE (OUTPATIENT)
Dept: ORTHOPEDICS | Facility: CLINIC | Age: 55
End: 2020-05-21

## 2020-05-21 DIAGNOSIS — S52.125D CLOSED NONDISPLACED FRACTURE OF HEAD OF LEFT RADIUS WITH ROUTINE HEALING: Primary | ICD-10-CM

## 2020-05-29 ENCOUNTER — TELEPHONE (OUTPATIENT)
Dept: ORTHOPEDICS | Facility: CLINIC | Age: 55
End: 2020-05-29

## 2020-05-29 NOTE — TELEPHONE ENCOUNTER
----- Message from Haily López sent at 5/29/2020  2:21 PM CDT -----  Contact: [phone]/self  Patient is requesting a call back regarding rescheduling her appointments for an office visit. Please call her to schedule. She is ready for her surgery. Thanks

## 2020-06-15 ENCOUNTER — TELEPHONE (OUTPATIENT)
Dept: ORTHOPEDICS | Facility: CLINIC | Age: 55
End: 2020-06-15

## 2020-06-15 ENCOUNTER — HOSPITAL ENCOUNTER (OUTPATIENT)
Dept: RADIOLOGY | Facility: HOSPITAL | Age: 55
Discharge: HOME | End: 2020-06-15
Attending: ORTHOPAEDIC SURGERY
Payer: MEDICAID

## 2020-06-15 ENCOUNTER — OFFICE VISIT (OUTPATIENT)
Dept: ORTHOPEDICS | Facility: CLINIC | Age: 55
End: 2020-06-15
Payer: MEDICAID

## 2020-06-15 VITALS — TEMPERATURE: 98 F

## 2020-06-15 DIAGNOSIS — M65.331 TRIGGER MIDDLE FINGER OF RIGHT HAND: Primary | ICD-10-CM

## 2020-06-15 DIAGNOSIS — S52.125D CLOSED NONDISPLACED FRACTURE OF HEAD OF LEFT RADIUS WITH ROUTINE HEALING: ICD-10-CM

## 2020-06-15 PROCEDURE — 73070 XR ELBOW 2 VIEWS LEFT: ICD-10-PCS | Mod: 26,LT,, | Performed by: RADIOLOGY

## 2020-06-15 PROCEDURE — 73070 X-RAY EXAM OF ELBOW: CPT | Mod: TC,PN,LT

## 2020-06-15 PROCEDURE — 73120 X-RAY EXAM OF HAND: CPT | Mod: 26,RT,, | Performed by: RADIOLOGY

## 2020-06-15 PROCEDURE — 73120 X-RAY EXAM OF HAND: CPT | Mod: TC,PN,RT

## 2020-06-15 PROCEDURE — 73070 X-RAY EXAM OF ELBOW: CPT | Mod: 26,LT,, | Performed by: RADIOLOGY

## 2020-06-15 PROCEDURE — 73120 XR HAND 2 VIEW RIGHT: ICD-10-PCS | Mod: 26,RT,, | Performed by: RADIOLOGY

## 2020-06-15 PROCEDURE — 99214 OFFICE O/P EST MOD 30 MIN: CPT | Mod: S$PBB,,, | Performed by: ORTHOPAEDIC SURGERY

## 2020-06-15 PROCEDURE — 99999 PR PBB SHADOW E&M-EST. PATIENT-LVL III: CPT | Mod: PBBFAC,,, | Performed by: ORTHOPAEDIC SURGERY

## 2020-06-15 PROCEDURE — 99214 PR OFFICE/OUTPT VISIT, EST, LEVL IV, 30-39 MIN: ICD-10-PCS | Mod: S$PBB,,, | Performed by: ORTHOPAEDIC SURGERY

## 2020-06-15 PROCEDURE — 99999 PR PBB SHADOW E&M-EST. PATIENT-LVL III: ICD-10-PCS | Mod: PBBFAC,,, | Performed by: ORTHOPAEDIC SURGERY

## 2020-06-15 PROCEDURE — 99213 OFFICE O/P EST LOW 20 MIN: CPT | Mod: PBBFAC,25,PN | Performed by: ORTHOPAEDIC SURGERY

## 2020-06-15 NOTE — PROGRESS NOTES
CC:  Triggering of the right middle and right ring finger        HPI:  Trudi Howard is a very pleasant 54 y.o. female with ongoing symptoms right hand for the past 6 months or so  Previously she was scheduled for trigger release of the right middle and ring fingers then she had a fall on her left elbow and sustained a radial head fracture had to delay surgery  She has now recovered from the radial head fracture would like to reschedule surgery in the right hand  She content 10 use to have some pain swelling and clicking of the right hand middle and ring fing the left elbow is doing well and no longer painful         PAST MEDICAL HISTORY:   Past Medical History:   Diagnosis Date    History of uterine fibroid     Intra-abdominal abscess 2017    Urinary retention, ruptured bladder, infected urinoma, sepsis    Morbid obesity     RUSSELL (obstructive sleep apnea)     Ovarian cyst 2017    Seizures      PAST SURGICAL HISTORY:   Past Surgical History:   Procedure Laterality Date    BREAST SURGERY      BUNIONECTOMY       SECTION      Exploratory Laparotomy w/ Abscess Drainage  2017        fibroidectomy      INCISIONAL HERNIA REPAIR  2019    Laparoscopic-      FAMILY HISTORY:   Family History   Adopted: Yes   Problem Relation Age of Onset    Heart disease Mother      SOCIAL HISTORY:   Social History     Socioeconomic History    Marital status: Legally      Spouse name: Not on file    Number of children: Not on file    Years of education: Not on file    Highest education level: Not on file   Occupational History    Not on file   Social Needs    Financial resource strain: Not on file    Food insecurity     Worry: Not on file     Inability: Not on file    Transportation needs     Medical: Not on file     Non-medical: Not on file   Tobacco Use    Smoking status: Never Smoker    Smokeless tobacco: Never Used   Substance and Sexual Activity    Alcohol  use: Yes     Comment: seldom    Drug use: No    Sexual activity: Yes     Partners: Male     Birth control/protection: None     Comment:    Lifestyle    Physical activity     Days per week: Not on file     Minutes per session: Not on file    Stress: Not on file   Relationships    Social connections     Talks on phone: Not on file     Gets together: Not on file     Attends Scientologist service: Not on file     Active member of club or organization: Not on file     Attends meetings of clubs or organizations: Not on file     Relationship status: Not on file   Other Topics Concern    Not on file   Social History Narrative    Not on file       MEDICATIONS:   Current Outpatient Medications:     acetaZOLAMIDE (DIAMOX) 250 MG tablet, , Disp: , Rfl: 4    cetirizine (ZYRTEC) 10 MG tablet, Take 1 tablet (10 mg total) by mouth once daily., Disp: 30 tablet, Rfl: 2    ethosuximide (ZARONTIN) 250 mg capsule, Take 250 mg by mouth 2 (two) times daily.  , Disp: , Rfl:     HYDROcodone-acetaminophen (NORCO) 5-325 mg per tablet, Take 1 tablet by mouth every 8 (eight) hours as needed for Pain., Disp: 30 tablet, Rfl: 0    phenobarbital (LUMINAL) 32.4 MG tablet, 3 QAM AND 5 TS HS, Disp: , Rfl: 5    traMADol (ULTRAM) 50 mg tablet, Take 1 tablet (50 mg total) by mouth every 4 (four) hours as needed for Pain., Disp: 40 tablet, Rfl: 0    albuterol (VENTOLIN HFA) 90 mcg/actuation inhaler, Inhale 2 puffs into the lungs every 6 (six) hours as needed for Wheezing. Rescue, Disp: 18 g, Rfl: 0  ALLERGIES:   Review of patient's allergies indicates:   Allergen Reactions    Aspirin      Contraindicated with other meds    Amoxicillin      rash    Milk containing products     Peanut        Review of Systems:  Constitutional: no fever or chills  ENT: no nasal congestion or sore throat  Respiratory: no cough or shortness of breath  Cardiovascular: no chest pain or palpitations  Gastrointestinal: no nausea or vomiting, PUD, GERD, NSAID  intolerance  Genitourinary: no hematuria or dysuria  Integument/Breast: no rash or pruritis  Hematologic/Lymphatic: no easy bruising or lymphadenopathy  Musculoskeletal: see HPI  Neurological: no seizures or tremors  Behavioral/Psych: no auditory or visual hallucinations      Physical Exam   Vitals:    06/15/20 1447   Temp: 97.8 °F (36.6 °C)   PainSc:   4       Constitutional: Oriented to person, place, and time. Appears well-developed and well-nourished.   HENT:   Head: Normocephalic and atraumatic.   Nose: Nose normal.   Eyes: No scleral icterus.   Neck: Normal range of motion.   Cardiovascular: Normal rate and regular rhythm.    Pulses:       Radial pulses are 2+ on the right side, and 2+ on the left side.   Pulmonary/Chest: Effort normal and breath sounds normal.   Abdominal: Soft.   Neurological: Alert and oriented to person, place, and time.   Skin: Skin is warm.   Psychiatric: Normal mood and affect.     MUSCULOSKELETAL UPPER EXTREMITY:  Examination of left elbow no tenderness at the fracture site range of motion excellent no popping or clicking no instability  Examination of the right hand demonstrates a slight flexed posture to the middle and ring fingers at the PIP joint and tenderness over the A1 pulley of the middle and ring finger  There is no obvious triggering but there is a slight click and pain with flexion and extension   strength slightly decreased sensation intact Tinel sign negative            Diagnostic Studies:  AP and lateral x-rays left elbow demonstrate healed radial neck fracture with slight angulation of about 30°        Assessment:  1.  Healed fracture radial neck left elbow slight angulation.  2.  Triggering right middle and ring fingers    Plan:  Fortunately left elbow is not painful and does not seem to have any instability but I do want to keep an eye on the elbow as she return to regular activities  For the right hand she would like to go ahead and set up surgery trigger release  "right middle and ring fingers  She does have slight flexion contracture and explained to her that that would most likely require some postoperative therapy she understands      The risks and benefits of surgery were discussed with the patient today and they understand.  The consent was signed in the office for surgery.      Kenneth Tang MD (Jay)  [facility]  Orthopedic Upper Extremity Surgery      "

## 2020-08-24 ENCOUNTER — ANESTHESIA EVENT (OUTPATIENT)
Dept: SURGERY | Facility: HOSPITAL | Age: 55
End: 2020-08-24
Payer: MEDICAID

## 2020-08-25 ENCOUNTER — LAB VISIT (OUTPATIENT)
Dept: URGENT CARE | Facility: CLINIC | Age: 55
End: 2020-08-25
Payer: MEDICAID

## 2020-08-25 VITALS — HEART RATE: 105 BPM | OXYGEN SATURATION: 95 % | RESPIRATION RATE: 18 BRPM | TEMPERATURE: 98 F

## 2020-08-25 PROCEDURE — U0003 INFECTIOUS AGENT DETECTION BY NUCLEIC ACID (DNA OR RNA); SEVERE ACUTE RESPIRATORY SYNDROME CORONAVIRUS 2 (SARS-COV-2) (CORONAVIRUS DISEASE [COVID-19]), AMPLIFIED PROBE TECHNIQUE, MAKING USE OF HIGH THROUGHPUT TECHNOLOGIES AS DESCRIBED BY CMS-2020-01-R: HCPCS

## 2020-08-26 LAB — SARS-COV-2 RNA RESP QL NAA+PROBE: NOT DETECTED

## 2020-08-28 ENCOUNTER — HOSPITAL ENCOUNTER (OUTPATIENT)
Facility: HOSPITAL | Age: 55
Discharge: HOME | End: 2020-08-28
Attending: ORTHOPAEDIC SURGERY | Admitting: ORTHOPAEDIC SURGERY
Payer: MEDICAID

## 2020-08-28 ENCOUNTER — ANESTHESIA (OUTPATIENT)
Dept: SURGERY | Facility: HOSPITAL | Age: 55
End: 2020-08-28
Payer: MEDICAID

## 2020-08-28 VITALS
WEIGHT: 268 LBS | SYSTOLIC BLOOD PRESSURE: 138 MMHG | HEART RATE: 67 BPM | BODY MASS INDEX: 38.37 KG/M2 | TEMPERATURE: 98 F | OXYGEN SATURATION: 98 % | DIASTOLIC BLOOD PRESSURE: 89 MMHG | RESPIRATION RATE: 18 BRPM | HEIGHT: 70 IN

## 2020-08-28 DIAGNOSIS — M65.331 TRIGGER MIDDLE FINGER OF RIGHT HAND: ICD-10-CM

## 2020-08-28 LAB
ANION GAP SERPL CALC-SCNC: 7 MMOL/L (ref 8–16)
BUN SERPL-MCNC: 12 MG/DL (ref 6–20)
CALCIUM SERPL-MCNC: 8.7 MG/DL (ref 8.7–10.5)
CHLORIDE SERPL-SCNC: 115 MMOL/L (ref 95–110)
CO2 SERPL-SCNC: 19 MMOL/L (ref 23–29)
CREAT SERPL-MCNC: 0.8 MG/DL (ref 0.5–1.4)
EST. GFR  (AFRICAN AMERICAN): >60 ML/MIN/1.73 M^2
EST. GFR  (NON AFRICAN AMERICAN): >60 ML/MIN/1.73 M^2
GLUCOSE SERPL-MCNC: 104 MG/DL (ref 70–110)
POTASSIUM SERPL-SCNC: 3.9 MMOL/L (ref 3.5–5.1)
SODIUM SERPL-SCNC: 141 MMOL/L (ref 136–145)

## 2020-08-28 PROCEDURE — 26055 PR INCISE FINGER TENDON SHEATH: ICD-10-PCS | Mod: F7,,, | Performed by: ORTHOPAEDIC SURGERY

## 2020-08-28 PROCEDURE — 25000003 PHARM REV CODE 250: Performed by: ORTHOPAEDIC SURGERY

## 2020-08-28 PROCEDURE — 37000008 HC ANESTHESIA 1ST 15 MINUTES: Performed by: ORTHOPAEDIC SURGERY

## 2020-08-28 PROCEDURE — 26055 INCISE FINGER TENDON SHEATH: CPT | Mod: F7,,, | Performed by: ORTHOPAEDIC SURGERY

## 2020-08-28 PROCEDURE — 63600175 PHARM REV CODE 636 W HCPCS: Performed by: STUDENT IN AN ORGANIZED HEALTH CARE EDUCATION/TRAINING PROGRAM

## 2020-08-28 PROCEDURE — 80048 BASIC METABOLIC PNL TOTAL CA: CPT

## 2020-08-28 PROCEDURE — 36000707: Performed by: ORTHOPAEDIC SURGERY

## 2020-08-28 PROCEDURE — 01810 ANES PX NRV MUSC F/ARM WRST: CPT | Performed by: ORTHOPAEDIC SURGERY

## 2020-08-28 PROCEDURE — 71000015 HC POSTOP RECOV 1ST HR: Performed by: ORTHOPAEDIC SURGERY

## 2020-08-28 PROCEDURE — S0020 INJECTION, BUPIVICAINE HYDRO: HCPCS | Performed by: ORTHOPAEDIC SURGERY

## 2020-08-28 PROCEDURE — 36000706: Performed by: ORTHOPAEDIC SURGERY

## 2020-08-28 PROCEDURE — 36415 COLL VENOUS BLD VENIPUNCTURE: CPT

## 2020-08-28 PROCEDURE — 25000003 PHARM REV CODE 250: Performed by: STUDENT IN AN ORGANIZED HEALTH CARE EDUCATION/TRAINING PROGRAM

## 2020-08-28 PROCEDURE — 37000009 HC ANESTHESIA EA ADD 15 MINS: Performed by: ORTHOPAEDIC SURGERY

## 2020-08-28 RX ORDER — SODIUM CHLORIDE, SODIUM LACTATE, POTASSIUM CHLORIDE, CALCIUM CHLORIDE 600; 310; 30; 20 MG/100ML; MG/100ML; MG/100ML; MG/100ML
INJECTION, SOLUTION INTRAVENOUS CONTINUOUS PRN
Status: DISCONTINUED | OUTPATIENT
Start: 2020-08-28 | End: 2020-08-28

## 2020-08-28 RX ORDER — ONDANSETRON 2 MG/ML
INJECTION INTRAMUSCULAR; INTRAVENOUS
Status: DISCONTINUED | OUTPATIENT
Start: 2020-08-28 | End: 2020-08-28

## 2020-08-28 RX ORDER — ONDANSETRON 8 MG/1
8 TABLET, ORALLY DISINTEGRATING ORAL EVERY 8 HOURS PRN
Status: DISCONTINUED | OUTPATIENT
Start: 2020-08-28 | End: 2020-08-28 | Stop reason: HOSPADM

## 2020-08-28 RX ORDER — HYDROCODONE BITARTRATE AND ACETAMINOPHEN 5; 325 MG/1; MG/1
1 TABLET ORAL EVERY 4 HOURS PRN
Status: DISCONTINUED | OUTPATIENT
Start: 2020-08-28 | End: 2020-08-28 | Stop reason: HOSPADM

## 2020-08-28 RX ORDER — BUPIVACAINE HYDROCHLORIDE 5 MG/ML
INJECTION, SOLUTION EPIDURAL; INTRACAUDAL
Status: DISCONTINUED | OUTPATIENT
Start: 2020-08-28 | End: 2020-08-28 | Stop reason: HOSPADM

## 2020-08-28 RX ORDER — SODIUM CHLORIDE 0.9 % (FLUSH) 0.9 %
10 SYRINGE (ML) INJECTION
Status: DISCONTINUED | OUTPATIENT
Start: 2020-08-28 | End: 2020-08-28 | Stop reason: HOSPADM

## 2020-08-28 RX ORDER — DEXAMETHASONE SODIUM PHOSPHATE 4 MG/ML
INJECTION, SOLUTION INTRA-ARTICULAR; INTRALESIONAL; INTRAMUSCULAR; INTRAVENOUS; SOFT TISSUE
Status: DISCONTINUED | OUTPATIENT
Start: 2020-08-28 | End: 2020-08-28

## 2020-08-28 RX ORDER — MIDAZOLAM HYDROCHLORIDE 1 MG/ML
INJECTION, SOLUTION INTRAMUSCULAR; INTRAVENOUS
Status: DISCONTINUED | OUTPATIENT
Start: 2020-08-28 | End: 2020-08-28

## 2020-08-28 RX ORDER — PROPOFOL 10 MG/ML
INJECTION, EMULSION INTRAVENOUS CONTINUOUS PRN
Status: DISCONTINUED | OUTPATIENT
Start: 2020-08-28 | End: 2020-08-28

## 2020-08-28 RX ORDER — FENTANYL CITRATE 50 UG/ML
INJECTION, SOLUTION INTRAMUSCULAR; INTRAVENOUS
Status: DISCONTINUED | OUTPATIENT
Start: 2020-08-28 | End: 2020-08-28

## 2020-08-28 RX ORDER — CLINDAMYCIN PHOSPHATE 900 MG/50ML
900 INJECTION, SOLUTION INTRAVENOUS
Status: DISCONTINUED | OUTPATIENT
Start: 2020-08-28 | End: 2020-08-28 | Stop reason: HOSPADM

## 2020-08-28 RX ORDER — HYDROCODONE BITARTRATE AND ACETAMINOPHEN 5; 325 MG/1; MG/1
1 TABLET ORAL
Status: DISCONTINUED | OUTPATIENT
Start: 2020-08-28 | End: 2020-08-28 | Stop reason: HOSPADM

## 2020-08-28 RX ORDER — PROPOFOL 10 MG/ML
INJECTION, EMULSION INTRAVENOUS
Status: DISCONTINUED | OUTPATIENT
Start: 2020-08-28 | End: 2020-08-28

## 2020-08-28 RX ORDER — ACETAMINOPHEN 325 MG/1
650 TABLET ORAL EVERY 4 HOURS PRN
Status: DISCONTINUED | OUTPATIENT
Start: 2020-08-28 | End: 2020-08-28 | Stop reason: HOSPADM

## 2020-08-28 RX ORDER — OXYCODONE HYDROCHLORIDE 5 MG/1
10 TABLET ORAL EVERY 4 HOURS PRN
Status: DISCONTINUED | OUTPATIENT
Start: 2020-08-28 | End: 2020-08-28 | Stop reason: HOSPADM

## 2020-08-28 RX ORDER — HYDROCODONE BITARTRATE AND ACETAMINOPHEN 5; 325 MG/1; MG/1
1 TABLET ORAL EVERY 4 HOURS PRN
Qty: 30 TABLET | Refills: 0 | Status: SHIPPED | OUTPATIENT
Start: 2020-08-28 | End: 2023-12-10

## 2020-08-28 RX ORDER — LIDOCAINE HCL/PF 100 MG/5ML
SYRINGE (ML) INTRAVENOUS
Status: DISCONTINUED | OUTPATIENT
Start: 2020-08-28 | End: 2020-08-28

## 2020-08-28 RX ORDER — HYDROMORPHONE HYDROCHLORIDE 2 MG/ML
0.5 INJECTION, SOLUTION INTRAMUSCULAR; INTRAVENOUS; SUBCUTANEOUS EVERY 5 MIN PRN
Status: DISCONTINUED | OUTPATIENT
Start: 2020-08-28 | End: 2020-08-28 | Stop reason: HOSPADM

## 2020-08-28 RX ADMIN — DEXTROSE 2 G: 50 INJECTION, SOLUTION INTRAVENOUS at 07:08

## 2020-08-28 RX ADMIN — SODIUM CHLORIDE, SODIUM LACTATE, POTASSIUM CHLORIDE, AND CALCIUM CHLORIDE: .6; .31; .03; .02 INJECTION, SOLUTION INTRAVENOUS at 06:08

## 2020-08-28 RX ADMIN — PROPOFOL 150 MCG/KG/MIN: 10 INJECTION, EMULSION INTRAVENOUS at 07:08

## 2020-08-28 RX ADMIN — PROPOFOL 30 MG: 10 INJECTION, EMULSION INTRAVENOUS at 07:08

## 2020-08-28 RX ADMIN — LIDOCAINE HYDROCHLORIDE 100 MG: 20 INJECTION, SOLUTION INTRAVENOUS at 07:08

## 2020-08-28 RX ADMIN — MIDAZOLAM 2 MG: 1 INJECTION INTRAMUSCULAR; INTRAVENOUS at 06:08

## 2020-08-28 RX ADMIN — PROPOFOL 40 MG: 10 INJECTION, EMULSION INTRAVENOUS at 07:08

## 2020-08-28 RX ADMIN — FENTANYL CITRATE 25 MCG: 50 INJECTION, SOLUTION INTRAMUSCULAR; INTRAVENOUS at 07:08

## 2020-08-28 RX ADMIN — ONDANSETRON 4 MG: 2 INJECTION, SOLUTION INTRAMUSCULAR; INTRAVENOUS at 07:08

## 2020-08-28 RX ADMIN — DEXAMETHASONE SODIUM PHOSPHATE 4 MG: 4 INJECTION, SOLUTION INTRA-ARTICULAR; INTRALESIONAL; INTRAMUSCULAR; INTRAVENOUS; SOFT TISSUE at 07:08

## 2020-08-28 NOTE — PLAN OF CARE
Patient returned to room from Surgery.  Right hand's dressing is clean, dry and intact.   at bedside.  Safety is maintained with stretcher at the lowest, wheels locked and side rails up.  Will continue to monitor.

## 2020-08-28 NOTE — ANESTHESIA PREPROCEDURE EVALUATION
2020  Trudi Howard is a 54 y.o., female here for trigger finger release on R hand    Past Medical History:   Diagnosis Date    History of uterine fibroid     Intra-abdominal abscess 2017    Urinary retention, ruptured bladder, infected urinoma, sepsis    Morbid obesity     RUSSELL (obstructive sleep apnea)     Ovarian cyst 2017    Seizures      Past Surgical History:   Procedure Laterality Date    BREAST SURGERY      BUNIONECTOMY       SECTION      Exploratory Laparotomy w/ Abscess Drainage  2017        fibroidectomy      INCISIONAL HERNIA REPAIR  2019    Laparoscopic-          Anesthesia Evaluation    I have reviewed the Patient Summary Reports.    I have reviewed the Nursing Notes. I have reviewed the NPO Status.      Review of Systems  Anesthesia Hx:  History of prior surgery of interest to airway management or planning: Denies Family Hx of Anesthesia complications.    Cardiovascular:   Exercise tolerance: good    Pulmonary:   Sleep Apnea    Endocrine:  Endocrine Normal        Physical Exam  General:  Obesity    Airway/Jaw/Neck:  Airway Findings: Mouth Opening: Normal Tongue: Normal  General Airway Assessment: Adult  Mallampati: III  Improves to III with phonation.  TM Distance: 4 - 6 cm  Jaw/Neck Findings:  Neck ROM: Normal ROM  Neck Findings:  Girth Increased, Short Neck     Eyes/Ears/Nose:  EYES/EARS/NOSE FINDINGS: Normal    Chest/Lungs:  Chest/Lungs Clear    Heart/Vascular:  Heart Findings: Normal       Mental Status:  Mental Status Findings: Normal        Anesthesia Plan  Type of Anesthesia, risks & benefits discussed:  Anesthesia Type:  general, MAC  Patient's Preference:   Intra-op Monitoring Plan: standard ASA monitors  Intra-op Monitoring Plan Comments:   Post Op Pain Control Plan: per primary service following discharge from  PACU  Post Op Pain Control Plan Comments:   Induction:   IV  Beta Blocker:  Patient is not currently on a Beta-Blocker (No further documentation required).       Informed Consent: Patient understands risks and agrees with Anesthesia plan.  Questions answered. Anesthesia consent signed with patient.  ASA Score: 3     Day of Surgery Review of History & Physical:    H&P update referred to the surgeon.         Ready For Surgery From Anesthesia Perspective.

## 2020-08-28 NOTE — ANESTHESIA POSTPROCEDURE EVALUATION
Anesthesia Post Evaluation    Patient: Trudi Howard    Procedure(s) Performed: Procedure(s) (LRB):  RELEASE, TRIGGER FINGER (Right)    Final Anesthesia Type: MAC    Patient location during evaluation: PACU  Patient participation: Yes- Able to Participate  Level of consciousness: awake and alert  Post-procedure vital signs: reviewed and stable  Pain management: adequate  Airway patency: patent    PONV status at discharge: No PONV  Anesthetic complications: no      Cardiovascular status: blood pressure returned to baseline  Respiratory status: unassisted  Hydration status: euvolemic            Vitals Value Taken Time   /89 08/28/20 0800   Temp 36.6 °C (97.8 °F) 08/28/20 0800   Pulse 67 08/28/20 0800   Resp 18 08/28/20 0800   SpO2 98 % 08/28/20 0800         No case tracking events are documented in the log.      Pain/Pati Score: No data recorded

## 2020-08-28 NOTE — OP NOTE
Certification of Assistant at Surgery       Surgery Date: 8/28/2020     Participating Surgeons:  Surgeon(s) and Role:     * Kenneth Tang Jr., MD - Primary    Procedures:  Procedure(s) (LRB):  RELEASE, TRIGGER FINGER (Right)    Assistant Surgeon's Certification of Necessity:  I understand that section 1842 (b) (6) (d) of the Social Security Act generally prohibits Medicare Part B reasonable charge payment for the services of assistants at surgery in teaching hospitals when qualified residents are available to furnish such services. I certify that the services for which payment is claimed were medically necessary, and that no qualified resident was available to perform the services. I further understand that these services are subject to post-payment review by the Medicare carrier.      John Villa PA-C    08/28/2020  7:36 AM

## 2020-08-28 NOTE — TRANSFER OF CARE
"Anesthesia Transfer of Care Note    Patient: Trudi Howard    Procedure(s) Performed: Procedure(s) (LRB):  RELEASE, TRIGGER FINGER (Right)    Patient location: OPS    Anesthesia Type: MAC    Transport from OR: Transported from OR on room air with adequate spontaneous ventilation    Post pain: adequate analgesia    Post assessment: no apparent anesthetic complications    Post vital signs: stable    Level of consciousness: awake, alert and oriented    Nausea/Vomiting: no nausea/vomiting    Complications: none    Transfer of care protocol was followed      Last vitals:   Visit Vitals  /89   Pulse 67   Temp 36.6 °C (97.8 °F)   Resp 18   Ht 5' 10" (1.778 m)   Wt 121.6 kg (268 lb)   SpO2 98%   Breastfeeding No   BMI 38.45 kg/m²     "

## 2020-08-28 NOTE — OP NOTE
Operative Note       Surgery Date: 8/28/2020     Surgeon(s) and Role:     * Kenneth Tang Jr., MD - Primary    Pre-op Diagnosis:  Trigger middle finger of right hand [M65.331]    Post-op Diagnosis: Post-Op Diagnosis Codes:     * Trigger middle finger of right hand [M65.331]    Procedure(s) (LRB):  RELEASE, TRIGGER FINGER (Right)    Anesthesia: Local MAC    Procedure in Detail/Findings:  Preop diagnosis:  1.  Triggering of the right middle finger.    2.  Triggering of the right ring finger.    Postop diagnosis:  Same.    Operative procedure:  1.  Trigger release right middle finger.    2.  Trigger release right ring finger.    Surgeon:  Clyde.    First assistant:  Allen    Anesthesia:  Mac.    EBL:  Minimal.    Specimen:  None.    Complications:  None.    Operative procedure in detail as follows:    After operative consent the patient brought the operating room placed supine on the operating room table.  Anesthesia by IV sedation followed by injection of xylocaine Marcaine combination into the operative site right palm.  Tourniquet applied right arm right upper extremity prepped and draped out in the normal sterile fashion.  Esmarch used to exsanguinate and the tourniquet inflated 225 mm of mercury.  Following this an oblique incision made at the base of the right middle finger with a 15 blade careful dissection used to expose the A1 pulley which was tight and thickened.  Was released longitudinally with the [place] blade and scissors the tendons were thickened but intact range of motion check noted to be full without triggering the wound irrigated and closed with interrupted 5 O nylon horizontal mattress suture    A 2nd incision made at the base of the ring finger in a similar matter.  Exposure of the A1 pulley protected the digital nerves and the A1 pulley released longitudinally with the scissors and [place] blade the tendons were in check intact and checked throughout a full range of motion with no triggering  the wound irrigated and closed with interrupted 5 O nylon horizontal mattress suture sterile dressings applied to both incisions followed by light wrap.  Tourniquet deflated patient brought to recovery room stable condition all sponge needle counts reported as correct no complications    Estimated Blood Loss: * No values recorded between 8/28/2020  7:19 AM and 8/28/2020  7:36 AM *           Specimens (From admission, onward)    None        Implants: * No implants in log *           Disposition: PACU - hemodynamically stable.           Condition: Good    Attestation:  I was present and scrubbed for the entire procedure.           Discharge Note    Admit Date: 8/28/2020    Attending Physician: Kenneth Tang Jr., MD     Discharge Physician: Kenneth Tang Jr., MD    Final Diagnosis: Post-Op Diagnosis Codes:     * Trigger middle finger of right hand [M65.331]    Disposition: Home or Self Care    Patient Instructions:   Current Discharge Medication List      START taking these medications    Details   !! HYDROcodone-acetaminophen (NORCO) 5-325 mg per tablet Take 1 tablet by mouth every 4 (four) hours as needed for Pain.  Qty: 30 tablet, Refills: 0    Comments: Quantity prescribed more than 7 day supply? No       !! - Potential duplicate medications found. Please discuss with provider.      CONTINUE these medications which have NOT CHANGED    Details   acetaZOLAMIDE (DIAMOX) 250 MG tablet Refills: 4      cetirizine (ZYRTEC) 10 MG tablet Take 1 tablet (10 mg total) by mouth once daily.  Qty: 30 tablet, Refills: 2    Associated Diagnoses: Upper respiratory tract infection, unspecified type      ethosuximide (ZARONTIN) 250 mg capsule Take 250 mg by mouth 2 (two) times daily.        phenobarbital (LUMINAL) 32.4 MG tablet 3 QAM AND 5 TS HS  Refills: 5      albuterol (VENTOLIN HFA) 90 mcg/actuation inhaler Inhale 2 puffs into the lungs every 6 (six) hours as needed for Wheezing. Rescue  Qty: 18 g, Refills: 0    Associated  Diagnoses: Upper respiratory tract infection, unspecified type      !! HYDROcodone-acetaminophen (NORCO) 5-325 mg per tablet Take 1 tablet by mouth every 8 (eight) hours as needed for Pain.  Qty: 30 tablet, Refills: 0    Comments: Quantity prescribed more than 7 day supply? Yes, quantity medically necessary  Associated Diagnoses: Closed nondisplaced fracture of head of left radius, initial encounter      traMADol (ULTRAM) 50 mg tablet Take 1 tablet (50 mg total) by mouth every 4 (four) hours as needed for Pain.  Qty: 40 tablet, Refills: 0    Comments: Quantity prescribed more than 7 day supply? No  Associated Diagnoses: Closed nondisplaced fracture of head of left radius, initial encounter       !! - Potential duplicate medications found. Please discuss with provider.          Discharge Procedure Orders (must include Diet, Follow-up, Activity)   Discharge Procedure Orders (must include Diet, Follow-up, Activity)   Diet general     Call MD for:  temperature >100.4     Call MD for:  persistent nausea and vomiting     Call MD for:  severe uncontrolled pain     Keep surgical extremity elevated     Remove dressing in 72 hours     Shower on day dressing removed (No bath)        Discharge Date: No discharge date for patient encounter.

## 2020-08-28 NOTE — PLAN OF CARE
Patient is discharged home.  Discharge instructions on medications, signs and symptoms when to call the MD, post-op care and follow-up visits are given to the patient and patient verbalized complete understanding.  Patient has received pain medications from Outpatient Pharmacy.  Patient is wheeled to the Exit per Outpatient Surgery staff.  Voiced no complaints.  Safety maintained.

## 2020-08-28 NOTE — H&P
CC: Triggering of the right middle and right ring finger   HPI:   Trudi Howard is a very pleasant 54 y.o. female with ongoing symptoms right hand for the past 6 months or so   Previously she was scheduled for trigger release of the right middle and ring fingers then she had a fall on her left elbow and sustained a radial head fracture had to delay surgery   She has now recovered from the radial head fracture would like to reschedule surgery in the right hand   She content 10 use to have some pain swelling and clicking of the right hand middle and ring fing the left elbow is doing well and no longer painful   PAST MEDICAL HISTORY:        Past Medical History:   Diagnosis Date    History of uterine fibroid     Intra-abdominal abscess 2017    Urinary retention, ruptured bladder, infected urinoma, sepsis    Morbid obesity     RUSSELL (obstructive sleep apnea)     Ovarian cyst 2017    Seizures    PAST SURGICAL HISTORY:         Past Surgical History:   Procedure Laterality Date    BREAST SURGERY      BUNIONECTOMY       SECTION      Exploratory Laparotomy w/ Abscess Drainage  2017        fibroidectomy      INCISIONAL HERNIA REPAIR  2019    Laparoscopic-    FAMILY HISTORY:         Family History   Adopted: Yes   Problem Relation Age of Onset    Heart disease Mother    SOCIAL HISTORY:   Social History                                                                                                                                                                                                                                                                         MEDICATIONS:   Current Outpatient Medications:    acetaZOLAMIDE (DIAMOX) 250 MG tablet, , Disp: , Rfl: 4    cetirizine (ZYRTEC) 10 MG tablet, Take 1 tablet (10 mg total) by mouth once daily., Disp: 30 tablet, Rfl: 2    ethosuximide (ZARONTIN) 250 mg capsule, Take 250 mg by mouth 2 (two) times daily. , Disp: ,  Rfl:    HYDROcodone-acetaminophen (NORCO) 5-325 mg per tablet, Take 1 tablet by mouth every 8 (eight) hours as needed for Pain., Disp: 30 tablet, Rfl: 0    phenobarbital (LUMINAL) 32.4 MG tablet, 3 QAM AND 5 TS HS, Disp: , Rfl: 5    traMADol (ULTRAM) 50 mg tablet, Take 1 tablet (50 mg total) by mouth every 4 (four) hours as needed for Pain., Disp: 40 tablet, Rfl: 0    albuterol (VENTOLIN HFA) 90 mcg/actuation inhaler, Inhale 2 puffs into the lungs every 6 (six) hours as needed for Wheezing. Rescue, Disp: 18 g, Rfl: 0   ALLERGIES:         Review of patient's allergies indicates:   Allergen Reactions    Aspirin      Contraindicated with other meds    Amoxicillin      rash    Milk containing products     Peanut    Review of Systems:   Constitutional: no fever or chills   ENT: no nasal congestion or sore throat   Respiratory: no cough or shortness of breath   Cardiovascular: no chest pain or palpitations   Gastrointestinal: no nausea or vomiting, PUD, GERD, NSAID intolerance   Genitourinary: no hematuria or dysuria   Integument/Breast: no rash or pruritis   Hematologic/Lymphatic: no easy bruising or lymphadenopathy   Musculoskeletal: see HPI   Neurological: no seizures or tremors   Behavioral/Psych: no auditory or visual hallucinations   Physical Exam       Vitals:    06/15/20 1447   Temp: 97.8 °F (36.6 °C)   PainSc: 4   Constitutional: Oriented to person, place, and time. Appears well-developed and well-nourished.   HENT:   Head: Normocephalic and atraumatic.   Nose: Nose normal.   Eyes: No scleral icterus.   Neck: Normal range of motion.   Cardiovascular: Normal rate and regular rhythm.   Pulses:   Radial pulses are 2+ on the right side, and 2+ on the left side.   Pulmonary/Chest: Effort normal and breath sounds normal.   Abdominal: Soft.   Neurological: Alert and oriented to person, place, and time.   Skin: Skin is warm.   Psychiatric: Normal mood and affect.   MUSCULOSKELETAL UPPER EXTREMITY:   Examination  "of left elbow no tenderness at the fracture site range of motion excellent no popping or clicking no instability   Examination of the right hand demonstrates a slight flexed posture to the middle and ring fingers at the PIP joint and tenderness over the A1 pulley of the middle and ring finger   There is no obvious triggering but there is a slight click and pain with flexion and extension    strength slightly decreased sensation intact Tinel sign negative   Diagnostic Studies: AP and lateral x-rays left elbow demonstrate healed radial neck fracture with slight angulation of about 30°   Assessment: 1. Healed fracture radial neck left elbow slight angulation.   2. Triggering right middle and ring fingers   Plan: Fortunately left elbow is not painful and does not seem to have any instability but I do want to keep an eye on the elbow as she return to regular activities   For the right hand she would like to go ahead and set up surgery trigger release right middle and ring fingers   She does have slight flexion contracture and explained to her that that would most likely require some postoperative therapy she understands   The risks and benefits of surgery were discussed with the patient today and they understand.   The consent was signed in the office for surgery.   Kenneth Tang MD (Jay)   [facility]   Orthopedic Upper Extremity Surgery    "

## 2020-09-08 ENCOUNTER — OFFICE VISIT (OUTPATIENT)
Dept: ORTHOPEDICS | Facility: CLINIC | Age: 55
End: 2020-09-08
Payer: MEDICAID

## 2020-09-08 VITALS — TEMPERATURE: 98 F

## 2020-09-08 DIAGNOSIS — Z98.890 S/P TRIGGER FINGER RELEASE: Primary | ICD-10-CM

## 2020-09-08 PROCEDURE — 99213 OFFICE O/P EST LOW 20 MIN: CPT | Mod: PBBFAC,PN | Performed by: ORTHOPAEDIC SURGERY

## 2020-09-08 PROCEDURE — 99999 PR PBB SHADOW E&M-EST. PATIENT-LVL III: ICD-10-PCS | Mod: PBBFAC,,, | Performed by: ORTHOPAEDIC SURGERY

## 2020-09-08 PROCEDURE — 99024 POSTOP FOLLOW-UP VISIT: CPT | Mod: ,,, | Performed by: ORTHOPAEDIC SURGERY

## 2020-09-08 PROCEDURE — 99999 PR PBB SHADOW E&M-EST. PATIENT-LVL III: CPT | Mod: PBBFAC,,, | Performed by: ORTHOPAEDIC SURGERY

## 2020-09-08 PROCEDURE — 99024 PR POST-OP FOLLOW-UP VISIT: ICD-10-PCS | Mod: ,,, | Performed by: ORTHOPAEDIC SURGERY

## 2020-09-08 NOTE — PROGRESS NOTES
Subjective:      Patient ID: Trudi Howard is a 54 y.o. female.    Chief Complaint: Post-op Evaluation (right ring/middle trigger finger release)      HPI: Trudi Howard is here for a postop visit. she is 11 days s/p right middle and right ring trigger finger releases,. She is doing o.k. Pt reports pain has been controlled and triggering has resolved. Post surgical complaints include: swelling and stiffness.     Past Medical History:   Diagnosis Date    History of uterine fibroid     Intra-abdominal abscess 04/2017    Urinary retention, ruptured bladder, infected urinoma, sepsis    Morbid obesity     RUSSELL (obstructive sleep apnea)     Ovarian cyst 2017    Seizures        Current Outpatient Medications:     acetaZOLAMIDE (DIAMOX) 250 MG tablet, , Disp: , Rfl: 4    cetirizine (ZYRTEC) 10 MG tablet, Take 1 tablet (10 mg total) by mouth once daily., Disp: 30 tablet, Rfl: 2    ethosuximide (ZARONTIN) 250 mg capsule, Take 250 mg by mouth 2 (two) times daily.  , Disp: , Rfl:     HYDROcodone-acetaminophen (NORCO) 5-325 mg per tablet, Take 1 tablet by mouth every 4 (four) hours as needed for Pain., Disp: 30 tablet, Rfl: 0    phenobarbital (LUMINAL) 32.4 MG tablet, 3 QAM AND 5 TS HS, Disp: , Rfl: 5    traMADol (ULTRAM) 50 mg tablet, Take 1 tablet (50 mg total) by mouth every 4 (four) hours as needed for Pain., Disp: 40 tablet, Rfl: 0    albuterol (VENTOLIN HFA) 90 mcg/actuation inhaler, Inhale 2 puffs into the lungs every 6 (six) hours as needed for Wheezing. Rescue, Disp: 18 g, Rfl: 0    HYDROcodone-acetaminophen (NORCO) 5-325 mg per tablet, Take 1 tablet by mouth every 8 (eight) hours as needed for Pain. (Patient not taking: Reported on 9/8/2020), Disp: 30 tablet, Rfl: 0  Review of patient's allergies indicates:   Allergen Reactions    Aspirin      Contraindicated with other meds    Amoxicillin      rash    Milk containing products     Peanut        Temp 98.4 °F (36.9 °C)     Review of  Systems   Constitution: Negative for chills and fever.   Cardiovascular: Negative for chest pain and palpitations.   Respiratory: Negative for shortness of breath and wheezing.    Skin: Negative for poor wound healing and rash.   Musculoskeletal: Positive for joint pain, joint swelling and stiffness.   Gastrointestinal: Negative for nausea and vomiting.   Genitourinary: Negative for dysuria and hematuria.   Neurological: Negative for seizures and tremors.   Psychiatric/Behavioral: Negative for altered mental status.   Allergic/Immunologic: Negative for environmental allergies and persistent infections.         Objective:    Ortho Exam   Right hand:  Small incision over the middle and ring finger A1 pulley with sutures in place. Wound margins are well approximated and healing nicely. No redness, warmth, drainage, or other signs of infection. moderate swelling. ROM wrist and fingers limited secondary to stiffness.  80% fist formation.  Lacking 5-10 degrees extension.  strength decreased.  Sensation intact. Pulses present.    GEN: Well developed, well nourished female. AAOX3. No acute distress.   Breathing unlabored.  Mood and affect appropriate.         Assessment:     Imaging:  None        1. S/P trigger finger release          Plan:       Regular wound care explained with soap and water.  Start using hand for light activities.    Neosporin to the incision for the next few days.  Leave uncovered at home.  Cover in public.  Practice finger extension on flat table.  Pt has requested OT at [facility].    Orders Placed This Encounter    Ambulatory referral/consult to Physical/Occupational Therapy     Follow up in about 3 weeks (around 9/29/2020).

## 2020-09-15 ENCOUNTER — CLINICAL SUPPORT (OUTPATIENT)
Dept: REHABILITATION | Facility: HOSPITAL | Age: 55
End: 2020-09-15
Payer: MEDICAID

## 2020-09-15 DIAGNOSIS — Z98.890 S/P TRIGGER FINGER RELEASE: ICD-10-CM

## 2020-09-15 PROCEDURE — L3921 HFO W/JOINT(S) CF: HCPCS

## 2020-09-15 PROCEDURE — 97110 THERAPEUTIC EXERCISES: CPT

## 2020-09-15 PROCEDURE — 97165 OT EVAL LOW COMPLEX 30 MIN: CPT

## 2020-09-15 NOTE — PLAN OF CARE
[facility] Occupational Therapy  Initial Evaluation     Name: Trudi Howard  Clinic Number: [id number]    Therapy Diagnosis:   Encounter Diagnosis   Name Primary?    S/P trigger finger release      Physician: Sandra Hebert PA-C    Physician Orders: eval and treat   Medical Diagnosis: Z98.890 (ICD-10-CM) - S/P trigger finger release   Z98.890 (ICD-10-CM) - S/P trigger finger release     Surgical Procedure/ Date :Z98.890 (ICD-10-CM) - S/P trigger finger release     Evaluation Date: 9/15/2020  Insurance:MEDICAID/[facility] COMMUNITY PLAN [facility] ([place]ICA[place])     Insurance Authorization period Expiration: 12/31/20   Plan of Care Certification Period: 10/30/20    Visit # / Visits Authortized: 1 / 20  Time In:2:40  Time Out: 3;30 pm   Total Billable Time: 60 minutes    Precautions: Standard    Subjective     Involved Side:  right  Dominant Side: left   Date of Onset:  Surgery 8/28/20 trigger finger release middle and ring fingers    Mechanism of Injury: unknown  History of Current Condition: stitches removed 1 weeks ago     Imaging: none   Previous Therapy: none     Patient's Goals for Therapy: full use of hand     Pain:  Functional Pain Scale Rating 0-10:   3/10 on average  3/10 at best  6/10 at worst  Locationright: middle and ring fingers   Description: Burning  Aggravating Factors: Bending  Easing Factors: massage    Occupation:   Not working   Working presently: disability  Duties:  House chores     Functional Limitations/Social History:    Previous functional status includes: Independent with all ADLs.     Current FunctionalStatus   Home/Living environment : lives with their family      Limitation of Functional Status as follows:   ADLs/IADLs:     - Feeding:Not Necessary    - Bathing:Not Necessary    - Dressing/Grooming: Not Necessary    - Driving: Not Necessary     Leisure: Crafts      Past Medical History/Physical Systems Review:   Trudi Howard  has a past medical history of  History of uterine fibroid, Intra-abdominal abscess, Morbid obesity, RUSSELL (obstructive sleep apnea), Ovarian cyst, and Seizures.    Trudi Howard  has a past surgical history that includes Breast surgery;  section; fibroidectomy; Exploratory Laparotomy w/ Abscess Drainage (2017); Bunionectomy; Incisional hernia repair (2019); and Trigger finger release (Right, 2020).    Trudi has a current medication list which includes the following prescription(s): acetazolamide, albuterol, cetirizine, ethosuximide, hydrocodone-acetaminophen, hydrocodone-acetaminophen, phenobarbital, and tramadol.    Review of patient's allergies indicates:   Allergen Reactions    Aspirin      Contraindicated with other meds    Amoxicillin      rash    Milk containing products     Peanut           Objective     Observation/Appearance:  Skin intact and Skin dry       Edema. Measured in centimeters.   9/15/2020 9/15/2020       Right     Long:          PIP  7cm       Ring:          PIP             7 cm        Hand ROM. Measured in degrees.   9/15/2020       Right             Long:  MP 0/60                 PIP -10/90                 DIP 0/65                 DURAN              Ring:   MP 0/65                 PIP -20/80                 DIP 0/65                 DURAN               Sensation  Median Nerve Distribution 9/15/2020 9/15/2020    Left Right   [place]     Normal 1.65-2.83  X    Diminished Light Touch 3.22-3.61     Diminished Protective 3.84-4.31     Loss of Protective 4.56-6.65     Untestable >6.65     2 Point Discrimination     Static     Dynamic          Strength (Dyanmometer) and Pinch Strength (Pinch Gauge)  Measured in pounds and psi. Average of three trials.   9/15/2020 9/15/2020        Left Right        Rung II def def       Mendieta Pinch def def       3pt Pinch def def       2pt Pinch def def           CMS Impairment/Limitation/Restriction for FOTO initial  Survey    Therapist reviewed FOTO scores  for Trudi Howard on 9/15/2020.   FOTO documents entered into [employer] - see Media section.                    9/15/2020     Category: Self care         Current : CJ = at least 20% but < 40% impaired, limited or restricted  Goal: CJ = at least 20% but < 40% impaired, limited or restricted  Discharge:          Treatment     Treatment Time In: 2:40 pm   Treatment Time Out: 3;30pm   Total Treatment time separate from Evaluation time:45     Trudi received the following manual therapy techniques for 5 minutes:   -scar massage     Trudi received therapeutic exercises for 15 minutes including:  -TGE and joint blocking pip and dip flexion middle and ring fingers      fabricated  Hand based finger extension splint for night time wear to middle and ring fingers.   Only to be worn at night        Home Exercise Program/Education:  Issued HEP:  TGE and joint blocking pip and dip flexion middle and ring fingers      and educated on modality use for pain management . Exercises were reviewed and Trudi was able to demonstrate them prior to the end of the session.   Pt received a written copy of exercises to perform at home. Trudi demonstrated good  understanding of the education provided.  Pt was advised to perform these exercises free of pain, and to stop performing them if pain occurs.    Patient/Family Education: role of OT, goals for OT, scheduling/cancellations - pt verbalized understanding. Discussed insurance limitations with patient.    Additional Education provided: splinting and exercises     Assessment     Trudi Howard is a 54 y.o. female referred to outpatient occupational/hand therapy and presents with a medical diagnosis of  Right middle and ring finger trigger fingers , resulting in decrease function  and demonstrates limitations as described in the chart below. Following  medical record review it is determined that pt will benefit from occupational therapy services in order to maximize pain free and/or  functional use of right hand .     The patient's rehab potential is Good.     Anticipated barriers to occupational therapy:  None   Pt has no cultural, educational or language barriers to learning provided.    Profile and History Assessment of Occupational Performance Level of Clinical Decision Making Complexity Score   Occupational Profile:   Trudi Howard is a 54 y.o. female who lives with their family and is currently unemployed . Trudi Howard has difficulty with  Dressing, fine motor to manage clothing   driving/transportation management  affecting his/her daily functional abilities. His/her main goal for therapy is  Full use of right hand .     Comorbidities:    has a past medical history of History of uterine fibroid, Intra-abdominal abscess, Morbid obesity, RUSSELL (obstructive sleep apnea), Ovarian cyst, and Seizures.    Medical and Therapy History Review:   Brief               Performance Deficits    Physical:  Joint Mobility    Cognitive:  Attention  Communication    Psychosocial:    No Deficits     Clinical Decision Making:  low    Assessment Process:  Problem-Focused Assessments    Modification/Need for Assistance:  Not Necessary    Intervention Selection:  Limited Treatment Options       low  Based on PMHX, co morbidities , data from assessments and functional level of assistance required with task and clinical presentation directly impacting function.       The following goals were discussed with the patient and patient is in agreement with them as to be addressed in the treatment plan.          GOALS: 6  weeks. Pt agrees with goals set.      Short Term (6 weeks on 10/30/20 ):  1)   Patient to be IND with HEP and modalities for pain management, progressing   2)   Increase ROM 10 degrees  For  Pip extension right middle and ring fingers  motion to increase functional hand use for right hand , progressing   5)   Decrease edema .1-2cm to increase joint mobility /flexibility for improved overall  functional hand use. , progressing   6)   Patient to be IND wiht Orthotic use, wear and care precautions.     Long Term (by discharge):  1)   Pt will report 0 out of 10 pain with right middle and ring fingers   progressing   2)   Patient to score of CJ  on FOTO to demonstrate improved perception of functionalright hand/ arm  Use. Progressing   3)   Pt will return to prior level of function for ADLs and household management, progressing              Plan   Certification Period/Plan of care expiration: 9/15/2020 to  10/30/20 .    Outpatient Occupational Therapy 1 times weekly for 8 weeks may include the following interventions: Paraffin, Fluidotherapy, Modalities for pain management, US 3 mhz, Therapeutic exercises/activities., Strengthening, Orthotic Fabrication/Fit/Training and Scar Management.      Estella Chiu, OT

## 2020-09-30 ENCOUNTER — CLINICAL SUPPORT (OUTPATIENT)
Dept: REHABILITATION | Facility: HOSPITAL | Age: 55
End: 2020-09-30
Payer: MEDICAID

## 2020-09-30 DIAGNOSIS — M25.641 DECREASED RANGE OF MOTION OF FINGER OF RIGHT HAND: ICD-10-CM

## 2020-09-30 DIAGNOSIS — M79.641 PAIN IN RIGHT HAND: ICD-10-CM

## 2020-09-30 PROCEDURE — 97110 THERAPEUTIC EXERCISES: CPT

## 2020-09-30 PROCEDURE — 97022 WHIRLPOOL THERAPY: CPT

## 2020-09-30 NOTE — PROGRESS NOTES
Occupational Therapy Daily Treatment Note     Date: 9/30/2020  Name: Trudi Howard  Clinic Number: [id number]    Therapy Diagnosis:   Encounter Diagnoses   Name Primary?    Decreased range of motion of finger of right hand     Pain in right hand      Physician: Sandra Hebert PA-C  Physician Orders: eval and treat   Medical Diagnosis: Z98.890 (ICD-10-CM) - S/P trigger finger release   Z98.890 (ICD-10-CM) - S/P trigger finger release      Surgical Procedure/ Date :Z98.890 (ICD-10-CM) - S/P trigger finger release      Evaluation Date: 9/15/2020  Insurance:MEDICAID/[facility] COMMUNITY PLAN [facility] ([place])      Insurance Authorization period Expiration: 12/31/20   Plan of Care Certification Period: 10/30/20     Visit # / Visits Authortized: 2 / 20  Time In:3:15 pm  Time Out: 4:00 pm   Total Billable Time: 25 minutes     Precautions: Standard         Subjective     Pt reports: she was compliant with home exercise program given last session.   Response to previous treatment:more motion  Functional change: washing dishes    Pain: 2/10  Location: right hand      Objective      Edema. Measured in centimeters.    9/15/2020     Right   Long:        PIP 7cm      Ring:        PIP            7 cm       Hand ROM. Measured in degrees.    9/15/2020     Right         Long:  MP 0/60              PIP -10/90              DIP 0/65              DURAN           Ring:   MP 0/65              PIP -20/80              DIP 0/65              DURAN                 Sensation  Median Nerve Distribution 9/15/2020 9/15/2020     Left Right   [place]       Normal 1.65-2.83   X    Diminished Light Touch 3.22-3.61       Diminished Protective 3.84-4.31       Loss of Protective 4.56-6.65       Untestable >6.65       2 Point Discrimination       Static       Dynamic              Strength (Dyanmometer) and Pinch Strength (Pinch Gauge)  Measured in pounds and psi. Average of three trials.    9/15/2020  9/15/2020     Left Right    Rung II def def   Mendieta Pinch def def   3pt Pinch def def   2pt Pinch def def        CMS Impairment/Limitation/Restriction for FOTO Hand Survey    Therapist reviewed FOTO scores for Trudi Howard on 9/30/2020.   FOTO documents entered into [employer] - see Media section.    Limitation Score: 61%  Category: Carrying    Current : CL = least 60% but < 80% impaired, limited or restricted  Goal: CJ = at least 20% but < 40% impaired, limited or restricted              Trudi received the following supervised modality after being cleared for contradictions:  Patient received fluidotherapy to right hand for 10 minutes to increase blood flow, circulation, desensitization, sensory re-education and for pain management.     Trudi received the following manual therapy techniques for 5 minutes:   -scar massage      Trudi received therapeutic exercises for 25 minutes including:  -TGE and joint blocking pip and dip flexion middle and ring fingers   -towel scrunches  -3 pom pom containers nesting           Home Exercises and Education Provided     Education provided:   - none today  - Progress towards goals: Good    Written Home Exercises Provided: Patient instructed to cont prior HEP.  Exercises were reviewed and Trudi was able to demonstrate them prior to the end of the session.  Trudi demonstrated good  understanding of the education provided.     See EMR under Patient Instructions for exercises provided prior visit.     Trudi demonstrated good  understanding of the education provided.         Assessment   Trudi Howard is a 54 y.o. female referred to outpatient occupational/hand therapy and presents with a medical diagnosis of  Right middle and ring finger trigger fingers , resulting in decrease function. Advanced light activities with mild difficulty. Pt needs frequent redirecting to the task on hand. Pt is washing dishes now.    Pt would continue to benefit from skilled OT.      Trudi is progressing  well towards her goals and there are no updates to goals at this time. Pt prognosis is Good.     Pt will continue to benefit from skilled outpatient occupational therapy to address the deficits listed in the problem list on initial evaluation provide pt/family education and to maximize pt's level of independence in the home and community environment.     Anticipated barriers to occupational therapy: attention span    Pt's spiritual, cultural and educational needs considered and pt agreeable to plan of care and goals.    Goals:  Short Term (6 weeks on 10/30/20 ):  1)   Patient to be IND with HEP and modalities for pain management, progressing   2)   Increase ROM 10 degrees  For  Pip extension right middle and ring fingers  motion to increase functional hand use for right hand , progressing   5)   Decrease edema .1-2cm to increase joint mobility /flexibility for improved overall functional hand use. , progressing   6)   Patient to be IND wiht Orthotic use, wear and care precautions.      Long Term (by discharge):  1)   Pt will report 0 out of 10 pain with right middle and ring fingers   progressing   2)   Patient to score of CJ  on FOTO to demonstrate improved perception of functionalright hand/ arm  Use. Progressing   3)   Pt will return to prior level of function for ADLs and household management, progressing       Plan: Measure /pinch next session   Certification Period/Plan of care expiration: 9/15/2020 to  10/30/20 .     Outpatient Occupational Therapy 1 times weekly for 8 weeks may include the following interventions: Paraffin, Fluidotherapy, Modalities for pain management, US 3 mhz, Therapeutic exercises/activities., Strengthening, Orthotic Fabrication/Fit/Training and Scar Management.       Discussed Plan of Care with patient: Yes  Updates/Grading for next session: Advance as tolerated.      Day Massey, OT

## 2020-10-06 ENCOUNTER — TELEPHONE (OUTPATIENT)
Dept: ORTHOPEDICS | Facility: CLINIC | Age: 55
End: 2020-10-06

## 2020-10-06 ENCOUNTER — OFFICE VISIT (OUTPATIENT)
Dept: ORTHOPEDICS | Facility: CLINIC | Age: 55
End: 2020-10-06
Payer: MEDICAID

## 2020-10-06 ENCOUNTER — HOSPITAL ENCOUNTER (OUTPATIENT)
Dept: RADIOLOGY | Facility: HOSPITAL | Age: 55
Discharge: HOME | End: 2020-10-06
Attending: ORTHOPAEDIC SURGERY
Payer: MEDICAID

## 2020-10-06 VITALS — HEIGHT: 70 IN | WEIGHT: 268 LBS | BODY MASS INDEX: 38.37 KG/M2

## 2020-10-06 DIAGNOSIS — M22.2X1 PATELLOFEMORAL PAIN SYNDROME OF RIGHT KNEE: ICD-10-CM

## 2020-10-06 DIAGNOSIS — M25.569 KNEE PAIN, UNSPECIFIED CHRONICITY, UNSPECIFIED LATERALITY: ICD-10-CM

## 2020-10-06 DIAGNOSIS — M25.569 KNEE PAIN, UNSPECIFIED CHRONICITY, UNSPECIFIED LATERALITY: Primary | ICD-10-CM

## 2020-10-06 DIAGNOSIS — Z98.890 S/P TRIGGER FINGER RELEASE: Primary | ICD-10-CM

## 2020-10-06 PROCEDURE — 99999 PR PBB SHADOW E&M-EST. PATIENT-LVL III: ICD-10-PCS | Mod: PBBFAC,,, | Performed by: ORTHOPAEDIC SURGERY

## 2020-10-06 PROCEDURE — 99999 PR PBB SHADOW E&M-EST. PATIENT-LVL III: CPT | Mod: PBBFAC,,, | Performed by: ORTHOPAEDIC SURGERY

## 2020-10-06 PROCEDURE — 73564 X-RAY EXAM KNEE 4 OR MORE: CPT | Mod: TC,50,PN

## 2020-10-06 PROCEDURE — 73564 X-RAY EXAM KNEE 4 OR MORE: CPT | Mod: 26,50,, | Performed by: RADIOLOGY

## 2020-10-06 PROCEDURE — 73564 XR KNEE ORTHO BILAT WITH FLEXION: ICD-10-PCS | Mod: 26,50,, | Performed by: RADIOLOGY

## 2020-10-06 PROCEDURE — 99024 PR POST-OP FOLLOW-UP VISIT: ICD-10-PCS | Mod: ,,, | Performed by: ORTHOPAEDIC SURGERY

## 2020-10-06 PROCEDURE — 99024 POSTOP FOLLOW-UP VISIT: CPT | Mod: ,,, | Performed by: ORTHOPAEDIC SURGERY

## 2020-10-06 PROCEDURE — 99213 OFFICE O/P EST LOW 20 MIN: CPT | Mod: PBBFAC,25,PN | Performed by: ORTHOPAEDIC SURGERY

## 2020-10-06 NOTE — TELEPHONE ENCOUNTER
----- Message from Kristie Cotton sent at 10/6/2020  1:53 PM CDT -----  Type:  Patient Returning Call    Who Called:pt  Who Left Message for Patient: pt  Does the patient know what this is regarding?: pt may be a lil late   Would the patient rather a call back or a response via [facility]?  Call   Best Call Back Number:[phone]  Additional Information:  tanisha roman

## 2020-10-06 NOTE — PROGRESS NOTES
"Subjective:      Patient ID: Trudi Howard is a 54 y.o. female.    Chief Complaint: Post-op Evaluation (Right middle/ ring/  Surgery 8/28)      HPI: Trudi Howard is here for postop visit.  She is 6 weeks status post right middle and right ring trigger finger releases.  She has been physical therapy and reports slight improvement but has only been to a few sessions.  Still has incisional tenderness and flexure contractures.    Unrelated, patient reports recent onset right knee pain.  Pain is located anteriorly and is worse with flexion loading.  Patient complains of crepitation and "popping". She denies any relevant h/o injury. She has not tried anything for these symptoms.     Past Medical History:   Diagnosis Date    History of uterine fibroid     Intra-abdominal abscess 04/2017    Urinary retention, ruptured bladder, infected urinoma, sepsis    Morbid obesity     RUSSELL (obstructive sleep apnea)     Ovarian cyst 2017    Seizures        Current Outpatient Medications:     acetaZOLAMIDE (DIAMOX) 250 MG tablet, , Disp: , Rfl: 4    cetirizine (ZYRTEC) 10 MG tablet, Take 1 tablet (10 mg total) by mouth once daily., Disp: 30 tablet, Rfl: 2    ethosuximide (ZARONTIN) 250 mg capsule, Take 250 mg by mouth 2 (two) times daily.  , Disp: , Rfl:     HYDROcodone-acetaminophen (NORCO) 5-325 mg per tablet, Take 1 tablet by mouth every 8 (eight) hours as needed for Pain., Disp: 30 tablet, Rfl: 0    HYDROcodone-acetaminophen (NORCO) 5-325 mg per tablet, Take 1 tablet by mouth every 4 (four) hours as needed for Pain., Disp: 30 tablet, Rfl: 0    phenobarbital (LUMINAL) 32.4 MG tablet, 3 QAM AND 5 TS HS, Disp: , Rfl: 5    traMADol (ULTRAM) 50 mg tablet, Take 1 tablet (50 mg total) by mouth every 4 (four) hours as needed for Pain., Disp: 40 tablet, Rfl: 0    albuterol (VENTOLIN HFA) 90 mcg/actuation inhaler, Inhale 2 puffs into the lungs every 6 (six) hours as needed for Wheezing. Rescue, Disp: 18 g, Rfl: " "0  Review of patient's allergies indicates:   Allergen Reactions    Aspirin      Contraindicated with other meds    Amoxicillin      rash    Milk containing products     Peanut        Ht 5' 10" (1.778 m)   Wt 121.6 kg (268 lb)   BMI 38.45 kg/m²     ROS      Objective:    Ortho Exam       Right upper extremity:  Significant for well-healed trigger finger incisions with mild tenderness to palpation. Hyperpigmentation noted Lacking full extension. 80% fist formation. No triggering.  Sensation intact.  Pulses present.  Cap refill brisk.    Right KNEE:  The patient walks with nonantalgic gait.  Resisted SLR negative.  The skin over the knee is intact.  Knee effusion none.  Tendernes is located anterior.  Range of motion- Flexion full deg, Extension full deg,   Ligament exam:              MCL intact              Lachman intact              Post sag intact              LCL intact  Patellar crepitation present.  Pulses DP present, PT present  Motor normal 5/5  strength in all tested muscle groups.   Sensory normal    GEN: Well developed, well nourished female. AAOX3. No acute distress.   Normocephalic, atraumatic.   PARVEEN  Breathing unlabored.  Mood and affect appropriate.     Assessment:     Imaging:  Bilateral knee radiographs today with well maintained joint spaces        1. S/P trigger finger release    2. Patellofemoral pain syndrome of right knee          Plan:           Continue physical therapy for the trigger fingers.  Activities as tolerated.  Scar massage encouraged.    For the knee, I recommended lateral J brace and OTC anti-inflammatories.  Weight loss recommended.  Activities as tolerated.  VMO strengthening exercises demonstrated explained.    Follow up in about 6 weeks (around 11/17/2020).          "

## 2020-10-06 NOTE — TELEPHONE ENCOUNTER
Pt states she will be running late, advised pt she will need to r/s if she does not arrive by 2:45 pm. Expressed verbal understanding.

## 2020-10-08 ENCOUNTER — CLINICAL SUPPORT (OUTPATIENT)
Dept: REHABILITATION | Facility: HOSPITAL | Age: 55
End: 2020-10-08
Payer: MEDICAID

## 2020-10-08 DIAGNOSIS — M25.641 DECREASED RANGE OF MOTION OF FINGER OF RIGHT HAND: Primary | ICD-10-CM

## 2020-10-08 PROCEDURE — 97110 THERAPEUTIC EXERCISES: CPT

## 2020-10-08 PROCEDURE — 97022 WHIRLPOOL THERAPY: CPT

## 2020-10-08 NOTE — PROGRESS NOTES
Occupational Therapy Daily Treatment Note     Date: 10/8/2020  Name: Trudi Howard  Clinic Number: [id number]    Therapy Diagnosis:   No diagnosis found.  Physician: Sandra Hebert PA-C  Physician Orders: eval and treat   Medical Diagnosis: Z98.890 (ICD-10-CM) - S/P trigger finger release   Z98.890 (ICD-10-CM) - S/P trigger finger release      Surgical Procedure/ Date :Z98.890 (ICD-10-CM) - S/P trigger finger release      Evaluation Date: 9/15/2020  Insurance:MEDICAID/[place] COMMUNITY PLAN [facility] ([place]      Insurance Authorization period Expiration: 12/31/20   Plan of Care Certification Period: 10/30/20     Visit # / Visits Authortized: 2 / 20  Time In:3:15 pm  Time Out: 4:00 pm   Total Billable Time: 25 minutes     Precautions: Standard         Subjective     Pt reports: she was compliant with home exercise program given last session.   Response to previous treatment:more motion  Functional change: washing dishes    Pain: 2/10  Location: right hand      Objective      Edema. Measured in centimeters.    9/15/2020     Right   Long:        PIP 7cm      Ring:        PIP            7 cm       Hand ROM. Measured in degrees.    9/15/2020     Right         Long:  MP 0/60              PIP -10/90              DIP 0/65              DURAN           Ring:   MP 0/65              PIP -20/80              DIP 0/65              DURAN                 Sensation  Median Nerve Distribution 9/15/2020 9/15/2020     Left Right   [place]ju       Normal 1.65-2.83   X    Diminished Light Touch 3.22-3.61       Diminished Protective 3.84-4.31       Loss of Protective 4.56-6.65       Untestable >6.65       2 Point Discrimination       Static       Dynamic              Strength (Dyanmometer) and Pinch Strength (Pinch Gauge)  Measured in pounds and psi. Average of three trials.    9/15/2020 9/15/2020     Left Right    Rung II def def   Mendieta Pinch def def   3pt Pinch def def   2pt Pinch def def         CMS Impairment/Limitation/Restriction for FOTO Hand Survey    Therapist reviewed FOTO scores for Trudi Howard on 10/8/2020.   FOTO documents entered into [employer] - see Media section.    Limitation Score: 61%  Category: Carrying    Current : CL = least 60% but < 80% impaired, limited or restricted  Goal: CJ = at least 20% but < 40% impaired, limited or restricted              Trudi received the following supervised modality after being cleared for contradictions:  Patient received fluidotherapy to right hand for 10 minutes to increase blood flow, circulation, desensitization, sensory re-education and for pain management.     Trudi received the following manual therapy techniques for 5 minutes:   -scar massage      Trudi received therapeutic exercises for 25 minutes including:  -TGE and joint blocking pip and dip flexion middle and ring fingers   -towel scrunches  -3 pom pom containers nesting           Home Exercises and Education Provided     Education provided:   - none today  - Progress towards goals: Good    Written Home Exercises Provided: Patient instructed to cont prior HEP.  Exercises were reviewed and Trudi was able to demonstrate them prior to the end of the session.  Trudi demonstrated good  understanding of the education provided.     See EMR under Patient Instructions for exercises provided prior visit.     Trudi demonstrated good  understanding of the education provided.         Assessment   Trudi Howard is a 54 y.o. female referred to outpatient occupational/hand therapy and presents with a medical diagnosis of  Right middle and ring finger trigger fingers , resulting in decrease function. Advanced light activities with mild difficulty. Pt needs frequent redirecting to the task on hand. Pt is washing dishes now.    Pt would continue to benefit from skilled OT.      Trudi is progressing well towards her goals and there are no updates to goals at this time. Pt prognosis is Good.     Pt will  continue to benefit from skilled outpatient occupational therapy to address the deficits listed in the problem list on initial evaluation provide pt/family education and to maximize pt's level of independence in the home and community environment.     Anticipated barriers to occupational therapy: attention span    Pt's spiritual, cultural and educational needs considered and pt agreeable to plan of care and goals.    Goals:  Short Term (6 weeks on 10/30/20 ):  1)   Patient to be IND with HEP and modalities for pain management, progressing   2)   Increase ROM 10 degrees  For  Pip extension right middle and ring fingers  motion to increase functional hand use for right hand , progressing   5)   Decrease edema .1-2cm to increase joint mobility /flexibility for improved overall functional hand use. , progressing   6)   Patient to be IND wiht Orthotic use, wear and care precautions.      Long Term (by discharge):  1)   Pt will report 0 out of 10 pain with right middle and ring fingers   progressing   2)   Patient to score of CJ  on FOTO to demonstrate improved perception of functionalright hand/ arm  Use. Progressing   3)   Pt will return to prior level of function for ADLs and household management, progressing       Plan: Measure /pinch next session   Certification Period/Plan of care expiration: 9/15/2020 to  10/30/20 .     Outpatient Occupational Therapy 1 times weekly for 8 weeks may include the following interventions: Paraffin, Fluidotherapy, Modalities for pain management, US 3 mhz, Therapeutic exercises/activities., Strengthening, Orthotic Fabrication/Fit/Training and Scar Management.       Discussed Plan of Care with patient: Yes  Updates/Grading for next session: Advance as tolerated.      Estella Chiu, OT                               Occupational Therapy Daily Treatment Note     Date: 10/8/2020  [id number]    Therapy Diagnosis:   No diagnosis found.  Physician:  Sandra Hebert PA-C  Physician Orders: eval and treat   Medical Diagnosis: Z98.890 (ICD-10-CM) - S/P trigger finger release   Z98.890 (ICD-10-CM) - S/P trigger finger release, right   long and ring fingers      Surgical Procedure/ Date :Z98.890 (ICD-10-CM) - S/P trigger finger release      Evaluation Date: 9/15/2020  Insurance:MEDICAID/[place] COMMUNITY PLAN [facility] ([place]      Insurance Authorization period Expiration: 12/31/20   Plan of Care Certification Period: 10/30/20     Visit # / Visits Authortized: 3/ 20  Time In:4:00 pm   Time Out: 4:15  pm   Total Billable Time: 30 minutes     Precautions: Standard         Subjective     Pt reports: she was compliant with home exercise program given last session.   Response to previous treatment:more motion  Functional change: washing dishes    Pain: 2/10  Location: right hand      Objective      Edema. Measured in centimeters.    9/15/2020     Right   Long:        PIP 7cm      Ring:        PIP            7 cm       Hand ROM. Measured in degrees.    9/15/2020     Right         Long:  MP 0/60              PIP -10/90              DIP 0/65              DURAN           Ring:   MP 0/65              PIP -20/80              DIP 0/65              DURAN                 Sensation  Median Nerve Distribution 9/15/2020 9/15/2020     Left Right   [place] Juju       Normal 1.65-2.83   X    Diminished Light Touch 3.22-3.61       Diminished Protective 3.84-4.31       Loss of Protective 4.56-6.65       Untestable >6.65       2 Point Discrimination       Static       Dynamic              Strength (Dyanmometer) and Pinch Strength (Pinch Gauge)  Measured in pounds and psi. Average of three trials.    9/15/2020 9/15/2020     Left Right    Rung II def def   Mendieta Pinch def def   3pt Pinch def def   2pt Pinch def def        CMS Impairment/Limitation/Restriction for FOTO Hand Survey    Therapist reviewed FOTO scores for Trudi Howard on 10/8/2020.   FOTO documents entered  into [facility] - see Media section.    Limitation Score: 61%  Category: Carrying    Current : CL = least 60% but < 80% impaired, limited or restricted  Goal: CJ = at least 20% but < 40% impaired, limited or restricted              Trudi received the following supervised modality after being cleared for contradictions:  Patient received fluidotherapy to right hand for 10 minutes to increase blood flow, circulation, desensitization, sensory re-education and for pain management.     Trudi received the following manual therapy techniques for 5 minutes:   -scar massage      Trudi received therapeutic exercises for 25 minutes including:  -TGE and joint blocking pip and dip flexion middle and ring fingers 20 reps   -towel scrunches 2 minutes   -3 pom pom containers nesting   isospheres 2 minutes      she brought in splint she put in on and she put in on dorsally , therapist  Demonstrated splint is to be worn palmarlly    Home Exercises and Education Provided     Education provided:   - none today  - Progress towards goals: Good    Written Home Exercises Provided: Patient instructed to cont prior HEP.  Exercises were reviewed and Trudi was able to demonstrate them prior to the end of the session.  Trudi demonstrated good  understanding of the education provided.     See EMR under Patient Instructions for exercises provided prior visit.     Trudi demonstrated good  understanding of the education provided.         Assessment   Trudi Howard is a 54 y.o. female referred to outpatient occupational/hand therapy and presents with a medical diagnosis of  Right middle and ring finger trigger fingers , resulting in decrease function. Advanced light activities with mild difficulty. Pt needs frequent redirecting to the task on hand. Pt is washing dishes now.    Pt would continue to benefit from skilled OT.      Trudi is progressing well towards her goals and there are no updates to goals at this time. Pt prognosis is Good.     Pt will  continue to benefit from skilled outpatient occupational therapy to address the deficits listed in the problem list on initial evaluation provide pt/family education and to maximize pt's level of independence in the home and community environment.     Anticipated barriers to occupational therapy: attention span    Pt's spiritual, cultural and educational needs considered and pt agreeable to plan of care and goals.    Goals:  Short Term (6 weeks on 10/30/20 ):  1)   Patient to be IND with HEP and modalities for pain management, progressing   2)   Increase ROM 10 degrees  For  Pip extension right middle and ring fingers  motion to increase functional hand use for right hand , progressing   5)   Decrease edema .1-2cm to increase joint mobility /flexibility for improved overall functional hand use. , progressing   6)   Patient to be IND wiht Orthotic use, wear and care precautions.      Long Term (by discharge):  1)   Pt will report 0 out of 10 pain with right middle and ring fingers   progressing   2)   Patient to score of CJ  on FOTO to demonstrate improved perception of functionalright hand/ arm  Use. Progressing   3)   Pt will return to prior level of function for ADLs and household management, progressing       Plan: Measure /pinch next session   Certification Period/Plan of care expiration: 9/15/2020 to  10/30/20 .     Outpatient Occupational Therapy 1 times weekly for 8 weeks may include the following interventions: Paraffin, Fluidotherapy, Modalities for pain management, US 3 mhz, Therapeutic exercises/activities., Strengthening, Orthotic Fabrication/Fit/Training and Scar Management.       Discussed Plan of Care with patient: Yes  Updates/Grading for next session: Advance as tolerated.      Estella Chiu, OT

## 2020-10-15 ENCOUNTER — CLINICAL SUPPORT (OUTPATIENT)
Dept: REHABILITATION | Facility: HOSPITAL | Age: 55
End: 2020-10-15
Payer: MEDICAID

## 2020-10-15 DIAGNOSIS — M25.641 DECREASED RANGE OF MOTION OF FINGER OF RIGHT HAND: ICD-10-CM

## 2020-10-15 PROBLEM — M25.649 DECREASED ROM OF FINGER: Status: ACTIVE | Noted: 2020-10-15

## 2020-10-15 PROCEDURE — 97022 WHIRLPOOL THERAPY: CPT

## 2020-10-15 PROCEDURE — 97110 THERAPEUTIC EXERCISES: CPT

## 2020-10-15 NOTE — PROGRESS NOTES
Occupational Therapy Daily Treatment Note     Date: 10/15/2020  Name: Trudi Howard  Clinic Number: [id number]    Therapy Diagnosis:   Encounter Diagnosis   Name Primary?    Decreased range of motion of finger of right hand      Physician: Sandra Hebert PA-C  Physician Orders: eval and treat   Medical Diagnosis: Z98.890 (ICD-10-CM) - S/P trigger finger release   Z98.890 (ICD-10-CM) - S/P trigger finger release      Surgical Procedure/ Date :Z98.890 (ICD-10-CM) - S/P trigger finger release      Evaluation Date: 9/15/2020  Insurance:MEDICAID/[facility] COMMUNITY PLAN [facility] ([place])      Insurance Authorization period Expiration: 12/31/20   Plan of Care Certification Period: 10/30/20     Visit # / Visits Authortized: 2 / 20  Time In:3:15 pm  Time Out: 4:00 pm   Total Billable Time: 25 minutes     Precautions: Standard         Subjective     Pt reports: she was compliant with home exercise program given last session.   Response to previous treatment:more motion  Functional change: washing dishes    Pain: 2/10  Location: right hand      Objective      Edema. Measured in centimeters.    9/15/2020     Right   Long:        PIP 7cm      Ring:        PIP            7 cm       Hand ROM. Measured in degrees.    9/15/2020     Right         Long:  MP 0/60              PIP -10/90              DIP 0/65              DURAN           Ring:   MP 0/65              PIP -20/80              DIP 0/65              DURAN                 Sensation  Median Nerve Distribution 9/15/2020 9/15/2020     Left Right   [place] Juju       Normal 1.65-2.83   X    Diminished Light Touch 3.22-3.61       Diminished Protective 3.84-4.31       Loss of Protective 4.56-6.65       Untestable >6.65       2 Point Discrimination       Static       Dynamic              Strength (Dyanmometer) and Pinch Strength (Pinch Gauge)  Measured in pounds and psi. Average of three trials.    10/15/20  10/15/20      Left( dominant)   Right    Rung II 50 33   Key Pinch 16 15   3pt Pinch 9 9   2pt Pinch 16 10        CMS Impairment/Limitation/Restriction for FOTO Hand Survey    Therapist reviewed FOTO scores for Trudi Howard on 10/15/2020.   FOTO documents entered into [employer] - see Media section.    Limitation Score: 61%  Category: Carrying    Current : CL = least 60% but < 80% impaired, limited or restricted  Goal: CJ = at least 20% but < 40% impaired, limited or restricted              Trudi received the following supervised modality after being cleared for contradictions:  Patient received fluidotherapy to right hand for 10 minutes to increase blood flow, circulation, desensitization, sensory re-education and for pain management.     Trudi received the following manual therapy techniques for 5 minutes:   -scar massage      Trudi received therapeutic exercises for 25 minutes including:  -TGE and joint blocking pip and dip flexion middle and ring fingers   -towel scrunches  -3 pom pom containers nesting  Yellow clothespin pinchng 2 minutes    issued yellow putty for home use ,  and log pinch 2 minutes       Home Exercises and Education Provided     Education provided:   - none today  - Progress towards goals: Good    Written Home Exercises Provided: Patient instructed to cont prior HEP.  Exercises were reviewed and Trudi was able to demonstrate them prior to the end of the session.  Trudi demonstrated good  understanding of the education provided.     See EMR under Patient Instructions for exercises provided prior visit.     Trudi demonstrated good  understanding of the education provided.         Assessment   Trudi Howard is a 54 y.o. female referred to outpatient occupational/hand therapy and presents with a medical diagnosis of  Right middle and ring finger trigger fingers , resulting in decrease function. Advanced light activities with mild difficulty. Pt needs frequent redirecting to the task on hand. Pt is washing dishes  now.    Pt would continue to benefit from skilled OT.      Trudi is progressing well towards her goals and there are no updates to goals at this time. Pt prognosis is Good.     Pt will continue to benefit from skilled outpatient occupational therapy to address the deficits listed in the problem list on initial evaluation provide pt/family education and to maximize pt's level of independence in the home and community environment.     Anticipated barriers to occupational therapy: attention span    Pt's spiritual, cultural and educational needs considered and pt agreeable to plan of care and goals.    Goals:  Short Term (6 weeks on 10/30/20 ):  1)   Patient to be IND with HEP and modalities for pain management, progressing   2)   Increase ROM 10 degrees  For  Pip extension right middle and ring fingers  motion to increase functional hand use for right hand , progressing   5)   Decrease edema .1-2cm to increase joint mobility /flexibility for improved overall functional hand use. , progressing   6)   Patient to be IND wiht Orthotic use, wear and care precautions.      Long Term (by discharge):  1)   Pt will report 0 out of 10 pain with right middle and ring fingers   progressing   2)   Patient to score of CJ  on FOTO to demonstrate improved perception of functionalright hand/ arm  Use. Progressing   3)   Pt will return to prior level of function for ADLs and household management, progressing       Plan: Measure /pinch next session   Certification Period/Plan of care expiration: 9/15/2020 to  10/30/20 .     Outpatient Occupational Therapy 1 times weekly for 8 weeks may include the following interventions: Paraffin, Fluidotherapy, Modalities for pain management, US 3 mhz, Therapeutic exercises/activities., Strengthening, Orthotic Fabrication/Fit/Training and Scar Management.       Discussed Plan of Care with patient: Yes  Updates/Grading for next session: Advance as tolerated.      Estella Chiu, OT

## 2020-10-30 NOTE — PATIENT INSTRUCTIONS
[facility], OCCUPATIONAL THERAPY  HOME EXERCISE PROGRAM     Complete the following exercises with 10 repetitions each, 4 x/day.     AROM: Elbow Flexion / Extension          Bend and straighten elbow in 3 different positions: thumb up, palm up, palm down.      AROM: Supination / Pronation   With your elbow by your side, turn your palm up then turn your palm down.    Copyright © I. All rights reserved.     Therapist: MARTHA Del Rio CHT            
normal...

## 2020-11-12 ENCOUNTER — CLINICAL SUPPORT (OUTPATIENT)
Dept: REHABILITATION | Facility: HOSPITAL | Age: 55
End: 2020-11-12
Payer: MEDICAID

## 2020-11-12 DIAGNOSIS — M65.341 TRIGGER RING FINGER OF RIGHT HAND: ICD-10-CM

## 2020-11-12 PROBLEM — M65.30 RIGHT TRIGGER FINGER: Status: ACTIVE | Noted: 2020-11-12

## 2020-11-12 PROCEDURE — 97022 WHIRLPOOL THERAPY: CPT

## 2020-11-12 PROCEDURE — 97110 THERAPEUTIC EXERCISES: CPT

## 2020-11-12 NOTE — PROGRESS NOTES
Occupational Therapy Daily Treatment Note     Date: 11/12/2020  Name: Trudi Howard  Clinic Number: [id number]    Therapy Diagnosis:   Encounter Diagnosis   Name Primary?    Trigger ring finger of right hand      Physician: Sandra Hebert PA-C  Physician Orders: eval and treat   Medical Diagnosis: Z98.890 (ICD-10-CM) - S/P trigger finger release   Z98.890 (ICD-10-CM) - S/P trigger finger release      Surgical Procedure/ Date :Z98.890 (ICD-10-CM) - S/P trigger finger release      Evaluation Date: 9/15/2020  Insurance:MEDICAID/[facility] COMMUNITY PLAN [facility] ([place])      Insurance Authorization period Expiration: 12/31/20   Plan of Care Certification Period: 10/30/20     Visit # / Visits Authortized: 3/ 20  Time In:4:05 pm  Time Out: 4:45 pm    Total Billable Time: 45 minutes     Precautions: Standard         Subjective     Pt reports: she was compliant with home exercise program given last session.   Response to previous treatment:more motion  Functional change: washing dishes    Pain: 2/10  Location: right hand      Objective      Edema. Measured in centimeters.    9/15/2020     Right   Long:        PIP 7cm      Ring:        PIP            7 cm       Hand ROM. Measured in degrees.    9/15/2020 11/12/20      Right           Long:  MP 0/60 0/60              PIP -10/90 -5/90              DIP 0/65 0/60              DURAN             Ring:   MP 0/65 0/60              PIP -20/80 -10/100              DIP 0/65 70              DURAN                  Sensation  Median Nerve Distribution 9/15/2020 9/15/2020     Left Right   [place] Juju       Normal 1.65-2.83   X    Diminished Light Touch 3.22-3.61       Diminished Protective 3.84-4.31       Loss of Protective 4.56-6.65       Untestable >6.65       2 Point Discrimination       Static       Dynamic              Strength (Dyanmometer) and Pinch Strength (Pinch Gauge)  Measured in pounds and psi. Average of three trials.    10/15/20   10/15/20  11/12/20      Left( dominant)  Right  Right    Rung II 50 33 38   Mendieta Pinch 16 15 15   3pt Pinch 9 9 9   2pt Pinch 16 10 8        CMS Impairment/Limitation/Restriction for FOTO Hand Survey    Therapist reviewed FOTO scores for Trudi Howard on 11/12/2020.   FOTO documents entered into [employer] - see Media section.    Limitation Score: 61%                Trudi received the following supervised modality after being cleared for contradictions:  Patient received fluidotherapy to right hand for 10 minutes to increase blood flow, circulation, desensitization, sensory re-education and for pain management.     Trudi received the following manual therapy techniques for 5 minutes:   -scar massage      Trudi received therapeutic exercises for 25 minutes including:  -TGE and joint blocking pip and dip flexion middle and ring fingers   -towel scrunches  -3 pom pom containers nesting  Yellow clothespin pinchng 2 minutes    issued yellow putty for home use ,  and log pinch 2 minutes       Home Exercises and Education Provided     Education provided:   - none today  - Progress towards goals: Good    Written Home Exercises Provided: Patient instructed to cont prior HEP.  Exercises were reviewed and Trudi was able to demonstrate them prior to the end of the session.  Trudi demonstrated good  understanding of the education provided.     See EMR under Patient Instructions for exercises provided prior visit.     Trudi demonstrated good  understanding of the education provided.         Assessment   Trudi Howard is a 54 y.o. female referred to outpatient occupational/hand therapy and presents with a medical diagnosis of  Right middle and ring finger trigger fingers , resulting in decrease function. Advanced light activities with mild difficulty. Pt needs frequent redirecting to the task on hand. Pt is washing dishes now.    Pt would continue to benefit from skilled OT.      Trudi is progressing well towards her goals  and there are no updates to goals at this time. Pt prognosis is Good.     Pt will continue to benefit from skilled outpatient occupational therapy to address the deficits listed in the problem list on initial evaluation provide pt/family education and to maximize pt's level of independence in the home and community environment.     Anticipated barriers to occupational therapy: attention span    Pt's spiritual, cultural and educational needs considered and pt agreeable to plan of care and goals.    Goals:  Short Term (6 weeks on 10/30/20 ):  1)   Patient to be IND with HEP and modalities for pain management, progressing   2)   Increase ROM 10 degrees  For  Pip extension right middle and ring fingers  motion to increase functional hand use for right hand , progressing   5)   Decrease edema .1-2cm to increase joint mobility /flexibility for improved overall functional hand use. , progressing   6)   Patient to be IND wiht Orthotic use, wear and care precautions.      Long Term (by discharge):  1)   Pt will report 0 out of 10 pain with right middle and ring fingers   progressing   2)   Patient to score of CJ  on FOTO to demonstrate improved perception of functionalright hand/ arm  Use. Progressing   3)   Pt will return to prior level of function for ADLs and household management, progressing       Plan: Measure /pinch next session   Certification Period/Plan of care expiration:11/12/20 to 12/30/20      Outpatient Occupational Therapy 1 times weekly for 8 weeks may include the following interventions: Paraffin, Fluidotherapy, Modalities for pain management, US 3 mhz, Therapeutic exercises/activities., Strengthening, Orthotic Fabrication/Fit/Training and Scar Management.       Discussed Plan of Care with patient: Yes  Updates/Grading for next session: Advance as tolerated.      Estella Chiu, OT

## 2020-11-19 ENCOUNTER — OFFICE VISIT (OUTPATIENT)
Dept: ORTHOPEDICS | Facility: CLINIC | Age: 55
End: 2020-11-19
Payer: MEDICAID

## 2020-11-19 VITALS — TEMPERATURE: 98 F | BODY MASS INDEX: 38.38 KG/M2 | WEIGHT: 268.06 LBS | HEIGHT: 70 IN

## 2020-11-19 DIAGNOSIS — M65.331 TRIGGER MIDDLE FINGER OF RIGHT HAND: Primary | ICD-10-CM

## 2020-11-19 PROCEDURE — 99024 POSTOP FOLLOW-UP VISIT: CPT | Mod: S$PBB,,, | Performed by: ORTHOPAEDIC SURGERY

## 2020-11-19 PROCEDURE — 99999 PR PBB SHADOW E&M-EST. PATIENT-LVL III: CPT | Mod: PBBFAC,,, | Performed by: ORTHOPAEDIC SURGERY

## 2020-11-19 PROCEDURE — 99024 PR POST-OP FOLLOW-UP VISIT: ICD-10-PCS | Mod: S$PBB,,, | Performed by: ORTHOPAEDIC SURGERY

## 2020-11-19 PROCEDURE — 99999 PR PBB SHADOW E&M-EST. PATIENT-LVL III: ICD-10-PCS | Mod: PBBFAC,,, | Performed by: ORTHOPAEDIC SURGERY

## 2020-11-19 PROCEDURE — 99213 OFFICE O/P EST LOW 20 MIN: CPT | Mod: PBBFAC,PN | Performed by: ORTHOPAEDIC SURGERY

## 2020-11-19 NOTE — PROGRESS NOTES
Subjective:      Patient ID: Trudi Howard is a 55 y.o. female.  Chief Complaint: Post-op Evaluation (3 month s/p trigger middle finger of right hand)      HPI  Trudi Howard is a  55 y.o. female presenting today for follow up of trigger release of the right middle and ring finger.  She reports that she is still having a little bit of swelling in the right hand she also is concerned that her fingers are not into full extension but true fully is only about 10° at the PIP joint and went over that with her today  She is currently doing therapy and wears the splint at night seems to do well with that no triggering reported.    Review of patient's allergies indicates:   Allergen Reactions    Aspirin      Contraindicated with other meds    Amoxicillin      rash    Milk containing products     Peanut          Current Outpatient Medications   Medication Sig Dispense Refill    acetaZOLAMIDE (DIAMOX) 250 MG tablet   4    cetirizine (ZYRTEC) 10 MG tablet Take 1 tablet (10 mg total) by mouth once daily. 30 tablet 2    ethosuximide (ZARONTIN) 250 mg capsule Take 250 mg by mouth 2 (two) times daily.        HYDROcodone-acetaminophen (NORCO) 5-325 mg per tablet Take 1 tablet by mouth every 8 (eight) hours as needed for Pain. 30 tablet 0    phenobarbital (LUMINAL) 32.4 MG tablet 3 QAM AND 5 TS HS  5    traMADol (ULTRAM) 50 mg tablet Take 1 tablet (50 mg total) by mouth every 4 (four) hours as needed for Pain. 40 tablet 0    albuterol (VENTOLIN HFA) 90 mcg/actuation inhaler Inhale 2 puffs into the lungs every 6 (six) hours as needed for Wheezing. Rescue 18 g 0    HYDROcodone-acetaminophen (NORCO) 5-325 mg per tablet Take 1 tablet by mouth every 4 (four) hours as needed for Pain. (Patient not taking: Reported on 11/19/2020) 30 tablet 0     No current facility-administered medications for this visit.        Past Medical History:   Diagnosis Date    History of uterine fibroid     Intra-abdominal abscess  "2017    Urinary retention, ruptured bladder, infected urinoma, sepsis    Morbid obesity     RUSSELL (obstructive sleep apnea)     Ovarian cyst 2017    Seizures        Past Surgical History:   Procedure Laterality Date    BREAST SURGERY      BUNIONECTOMY       SECTION      Exploratory Laparotomy w/ Abscess Drainage  2017        fibroidectomy      INCISIONAL HERNIA REPAIR  2019    Laparoscopic-     TRIGGER FINGER RELEASE Right 2020    Procedure: RELEASE, TRIGGER FINGER;  Surgeon: Kenneth Tang Jr., MD;  Location: [facility];  Service: Orthopedics;  Laterality: Right;  right middle and ring fingers       OBJECTIVE:   PHYSICAL EXAM:  Height: 5' 10" (177.8 cm) Weight: 121.6 kg (268 lb 1.3 oz)  Vitals:    20 1024   Temp: 97.8 °F (36.6 °C)   Weight: 121.6 kg (268 lb 1.3 oz)   Height: 5' 10" (1.778 m)   PainSc:   3     Ortho/SPM Exam  Examination right hand incision well healed in the palm slight swelling of all the digits which is really kind of equal throughout the hand  Range of motion fingers full slight flexion contracture of about 5 or 10° at the PIP of the middle and ring fingers    RADIOGRAPHS:  None  Comments: I have personally reviewed the imaging and I agree with the above radiologist's report.    ASSESSMENT/PLAN:     IMPRESSION:  Status post trigger release right hand    PLAN:  Continue therapy may be for another few weeks  Continue exercise at home and nighttime splinting  I have ordered some compounding cream for topical use for anti-inflammatory effect      FOLLOW UP:  6-8 weeks    Disclaimer: This note has been generated using voice-recognition software. There may be typographical errors that have been missed during proof-reading.    "

## 2020-12-01 ENCOUNTER — CLINICAL SUPPORT (OUTPATIENT)
Dept: REHABILITATION | Facility: HOSPITAL | Age: 55
End: 2020-12-01
Payer: MEDICAID

## 2020-12-01 DIAGNOSIS — M65.331 TRIGGER MIDDLE FINGER OF RIGHT HAND: Primary | ICD-10-CM

## 2020-12-01 PROCEDURE — 97110 THERAPEUTIC EXERCISES: CPT

## 2020-12-01 PROCEDURE — 97022 WHIRLPOOL THERAPY: CPT

## 2020-12-01 NOTE — PROGRESS NOTES
Occupational Therapy Daily Treatment Note     Date: 12/1/2020  Name: Trudi Howard  Clinic Number: [id number]    Therapy Diagnosis:   Encounter Diagnosis   Name Primary?    Trigger middle finger of right hand Yes    trigger ring finger  Of right hand   Physician: Sandra Hebert PA-C  Physician Orders: eval and treat   Medical Diagnosis: Z98.890 (ICD-10-CM) - S/P trigger finger release   Z98.890 (ICD-10-CM) - S/P trigger finger release      Surgical Procedure/ Date :Z98.890 (ICD-10-CM) - S/P trigger finger release      Evaluation Date: 9/15/2020  Insurance:MEDICA[employer]/[place] [employer] [facility] ([place]      Insurance Authorization period Expiration: 12/31/20   Plan of Care Certification Period: 10/30/20     Visit # / Visits Authortized: 3/ 20  Time In:4:05 pm  Time Out: 4:45 pm    Total Billable Time: 45 minutes     Precautions: Standard         Subjective     Pt reports: she was compliant with home exercise program given last session.   Response to previous treatment:more motion  Functional change: washing dishes    Pain: 2/10  Location: right hand      Objective      Edema. Measured in centimeters.    9/15/2020     Right   Long:        PIP 7cm      Ring:        PIP            7 cm       Hand ROM. Measured in degrees.    9/15/2020 11/12/20      Right           Long:  MP 0/60 0/60              PIP -10/90 -5/90              DIP 0/65 0/60              DURAN             Ring:   MP 0/65 0/60              PIP -20/80 -10/100              DIP 0/65 70              DURAN                  Sensation  Median Nerve Distribution 9/15/2020 9/15/2020     Left Right   [place]       Normal 1.65-2.83   X    Diminished Light Touch 3.22-3.61       Diminished Protective 3.84-4.31       Loss of Protective 4.56-6.65       Untestable >6.65       2 Point Discrimination       Static       Dynamic              Strength (Dyanmometer) and Pinch Strength (Pinch Gauge)  Measured in pounds and  psi. Average of three trials.    10/15/20  10/15/20  11/12/20      Left( dominant)  Right  Right    Rung II 50 33 38   Mendieta Pinch 16 15 15   3pt Pinch 9 9 9   2pt Pinch 16 10 8        CMS Impairment/Limitation/Restriction for FOTO Hand Survey    Therapist reviewed FOTO scores for Trudi Howard on 12/1/2020.   FOTO documents entered into [employer] - see Media section.    Limitation Score: 61%                Trudi received the following supervised modality after being cleared for contradictions:  Patient received fluidotherapy to right hand for 10 minutes to increase blood flow, circulation, desensitization, sensory re-education and for pain management.     Trudi received the following manual therapy techniques for 5 minutes:   -scar massage      Trudi received therapeutic exercises for 25 minutes including:  -TGE and joint blocking pip and dip flexion middle and ring fingers   -towel scrunches  Yellow clothespin pinchng 2 minutes    isopheres  2 minutes    green digit flex 2 minutes    issued yellow putty for home use ,  and log pinch 2 minutes   Pt saw the MD and reports that he told her that she could do a few more weeks of OT . Pt scheduled 4 more visits then plan for d/c       Home Exercises and Education Provided     Education provided:   - none today  - Progress towards goals: Good    Written Home Exercises Provided: Patient instructed to cont prior HEP.  Exercises were reviewed and Trudi was able to demonstrate them prior to the end of the session.  Trudi demonstrated good  understanding of the education provided.     See EMR under Patient Instructions for exercises provided prior visit.     Trudi demonstrated good  understanding of the education provided.         Assessment   Trudi Howard is a 54 y.o. female referred to outpatient occupational/hand therapy and presents with a medical diagnosis of  Right middle and ring finger trigger fingers , resulting in decrease function. Advanced light activities  with mild difficulty. Pt needs frequent redirecting to the task on hand. Pt is washing dishes now.    Pt would continue to benefit from skilled OT.      Trudi is progressing well towards her goals and there are no updates to goals at this time. Pt prognosis is Good.     Pt will continue to benefit from skilled outpatient occupational therapy to address the deficits listed in the problem list on initial evaluation provide pt/family education and to maximize pt's level of independence in the home and community environment.     Anticipated barriers to occupational therapy: attention span    Pt's spiritual, cultural and educational needs considered and pt agreeable to plan of care and goals.    Goals:  Short Term (6 weeks on 10/30/20 ):  1)   Patient to be IND with HEP and modalities for pain management,  Met   2)   Increase ROM 10 degrees  For  Pip extension right middle and ring fingers  motion to increase functional hand use for right hand , progressing   5)   Decrease edema .1-2cm to increase joint mobility /flexibility for improved overall functional hand use. , progressing   6)   Patient to be IND wiht Orthotic use, wear and care precautions.        Long Term (by discharge):  1)   Pt will report 0 out of 10 pain with right middle and ring fingers   progressing   2)   Patient to score of CJ  on FOTO to demonstrate improved perception of functionalright hand/ arm  Use. Progressing   3)   Pt will return to prior level of function for ADLs and household management, progressing       Plan: Measure /pinch next session   Certification Period/Plan of care expiration:11/12/20 to 12/30/20      Outpatient Occupational Therapy 1 times weekly for 8 weeks may include the following interventions: Paraffin, Fluidotherapy, Modalities for pain management, US 3 mhz, Therapeutic exercises/activities., Strengthening, Orthotic Fabrication/Fit/Training and Scar Management.       Discussed Plan of Care with patient:  Yes  Updates/Grading for next session: Advance as tolerated.      Estella Chiu, OT

## 2020-12-08 ENCOUNTER — TELEPHONE (OUTPATIENT)
Dept: OBSTETRICS AND GYNECOLOGY | Facility: CLINIC | Age: 55
End: 2020-12-08

## 2020-12-08 RX ORDER — CIPROFLOXACIN 500 MG/1
500 TABLET ORAL 2 TIMES DAILY
Qty: 20 TABLET | Refills: 0 | Status: SHIPPED | OUTPATIENT
Start: 2020-12-08 | End: 2020-12-18

## 2020-12-08 NOTE — TELEPHONE ENCOUNTER
----- Message from Brenda Cárdenas sent at 12/8/2020  8:19 AM CST -----  Regarding: Call back  Contact: [phone]  Type:  Same Day Appointment Request    Caller is requesting a same day appointment.  Caller declined first available appointment listed below.    Name of Caller: SARA MURDOCK [[id number]]  When is the first available appointment? January   Symptoms: Cyst on her private area   Best Call Back Number: [phone]  Additional Information: none

## 2020-12-08 NOTE — TELEPHONE ENCOUNTER
Pt is complaining of having a bartholin gland cyst the is bothering every thing she does. Pt has had the cyst x 3 days. Pt is complaining that it hurts to walk, get in and out of bad, and burns when the she urinates. Please advise

## 2020-12-09 ENCOUNTER — TELEPHONE (OUTPATIENT)
Dept: OBSTETRICS AND GYNECOLOGY | Facility: CLINIC | Age: 55
End: 2020-12-09

## 2020-12-09 NOTE — TELEPHONE ENCOUNTER
----- Message from Marina Rodriguez sent at 12/9/2020  8:35 AM CST -----  Type:  Needs Medical Advice    Who Called: pt  Symptoms (please be specific): heavy bleeding, twinge in abdomen   How long has patient had these symptoms:  this morning  Pharmacy name and phone #:    Would the patient rather a call back or a response via [facility]?   Best Call Back Number: [phone]  Additional Information:

## 2020-12-09 NOTE — TELEPHONE ENCOUNTER
Pt is complaining of having of bleeding from the bartholin's gland cyst. Pt is now complaining of having bleeding from the cyst. Advised pt that the bleeding from it can from it draining just to keep taking the antibiotics and sitting in warm baths. Pt verbalized understanding

## 2020-12-29 ENCOUNTER — CLINICAL SUPPORT (OUTPATIENT)
Dept: REHABILITATION | Facility: HOSPITAL | Age: 55
End: 2020-12-29
Payer: MEDICAID

## 2020-12-29 DIAGNOSIS — M65.331 TRIGGER MIDDLE FINGER OF RIGHT HAND: Primary | ICD-10-CM

## 2020-12-29 PROCEDURE — 97022 WHIRLPOOL THERAPY: CPT

## 2020-12-29 PROCEDURE — 97110 THERAPEUTIC EXERCISES: CPT

## 2020-12-29 NOTE — PROGRESS NOTES
Occupational Therapy Daily Treatment Note     Date: 12/29/2020  Name: Trudi Howard  Clinic Number: [id number]    Therapy Diagnosis:   No diagnosis found. trigger ring finger  Of right hand   Physician: Sandra Hebert PA-C  Physician Orders: eval and treat   Medical Diagnosis: Z98.890 (ICD-10-CM) - S/P trigger finger release   Z98.890 (ICD-10-CM) - S/P trigger finger release      Surgical Procedure/ Date :Z98.890 (ICD-10-CM) - S/P trigger finger release      Evaluation Date: 9/15/2020  Insurance:MEDICAID/[facility] COMMUNITY PLAN [facility] ([place])      Insurance Authorization period Expiration: 12/31/20   Plan of Care Certification Period: 10/30/20     Visit # / Visits Authortized: 3/ 20  Time In:4:05 pm  Time Out: 4:45 pm    Total Billable Time: 45 minutes     Precautions: Standard         Subjective   Pt reports that she hs not been here in therapy in last 4 weeks due to having some other illnesses.   Pt reports: she was compliant with home exercise program given last session.   Response to previous treatment:more motion  Functional change: washing dishes    Pain: 2/10  Location: right hand      Objective      Edema. Measured in centimeters.    9/15/2020     Right   Long:        PIP 7cm      Ring:        PIP            7 cm       Hand ROM. Measured in degrees.    9/15/2020 11/12/20  12/29/20     Right             Long:  MP 0/60 0/60 0/65               PIP -10/90 -5/90 -5/90              DIP 0/65 0/60 0/65               DURAN               Ring:   MP 0/65 0/60 0/60              PIP -20/80 -10/100 -10/100              DIP 0/65 70 70              DURAN                   Sensation  Median Nerve Distribution 9/15/2020 9/15/2020     Left Right   [place]       Normal 1.65-2.83   X    Diminished Light Touch 3.22-3.61       Diminished Protective 3.84-4.31       Loss of Protective 4.56-6.65       Untestable >6.65       2 Point Discrimination       Static       Dynamic               Strength (Dyanmometer) and Pinch Strength (Pinch Gauge)  Measured in pounds and psi. Average of three trials.    10/15/20  10/15/20  11/12/20  12/29/20      Left( dominant)  Right  Right   right    Rung II 50 33 38 32   Mendieta Pinch 16 15 15 13   3pt Pinch 9 9 9 11   2pt Pinch 16 10 8 8         CMS Impairment/Limitation/Restriction for FOTO Hand Survey    Therapist reviewed FOTO scores for Trudi Howard on 12/29/2020.   FOTO documents entered into [employer] - see Media section.    Limitation Score: 61%                Trudi received the following supervised modality after being cleared for contradictions:  Patient received fluidotherapy to right hand for 10 minutes to increase blood flow, circulation, desensitization, sensory re-education and for pain management.     Trudi received the following manual therapy techniques for 5 minutes:   -scar massage      Trudi received therapeutic exercises for 25 minutes including:  -TGE and joint blocking pip and dip flexion middle and ring fingers   -towel scrunches  Yellow clothespin pinchng 2 minutes    isopheres  2 minutes    green digit flex 2 minutes    issued yellow putty for home use ,  and log pinch 2 minutes   Pt saw the MD and reports that he told her that she could do a few more weeks of OT . Pt scheduled 4 more visits then plan for d/c       Home Exercises and Education Provided     Education provided:   - none today  - Progress towards goals: Good    Written Home Exercises Provided: Patient instructed to cont prior HEP.  Exercises were reviewed and Trudi was able to demonstrate them prior to the end of the session.  Trudi demonstrated good  understanding of the education provided.     See EMR under Patient Instructions for exercises provided prior visit.     Trudi demonstrated good  understanding of the education provided.         Assessment   Trudi Howard is a 54 y.o. female referred to outpatient occupational/hand therapy and presents with a medical  diagnosis of  Right middle and ring finger trigger fingers , resulting in decrease function. Advanced light activities with mild difficulty. Pt needs frequent redirecting to the task on hand. Pt is washing dishes now.    Pt would continue to benefit from skilled OT.      Trudi is progressing well towards her goals and there are no updates to goals at this time. Pt prognosis is Good.     Pt will continue to benefit from skilled outpatient occupational therapy to address the deficits listed in the problem list on initial evaluation provide pt/family education and to maximize pt's level of independence in the home and community environment.     Anticipated barriers to occupational therapy: attention span    Pt's spiritual, cultural and educational needs considered and pt agreeable to plan of care and goals.    Goals:  Short Term 1/15/21:  1)   Patient to be IND with HEP and modalities for pain management,  Met   2)   Increase ROM 10 degrees  For  Pip extension right middle and ring fingers  motion to increase functional hand use for right hand , progressing   5)   Decrease edema .1-2cm to increase joint mobility /flexibility for improved overall functional hand use. , progressing   6)   Patient to be IND wiht Orthotic use, wear and care precautions.        Long Term (by discharge):  1)   Pt will report 0 out of 10 pain with right middle and ring fingers   progressing   2)   Patient to score of CJ  on FOTO to demonstrate improved perception of functionalright hand/ arm  Use. Progressing   3)   Pt will return to prior level of function for ADLs and household management, progressing       Plan: Measure /pinch next session   Certification Period/Plan of care expiration:1/30/21        Outpatient Occupational Therapy 1 times weekly for 8 weeks may include the following interventions: Paraffin, Fluidotherapy, Modalities for pain management, US 3 mhz, Therapeutic exercises/activities., Strengthening, Orthotic  Fabrication/Fit/Training and Scar Management.       Discussed Plan of Care with patient: Yes  Updates/Grading for next session: Advance as tolerated.      Estella Chiu OT

## 2021-01-29 ENCOUNTER — TELEPHONE (OUTPATIENT)
Dept: ORTHOPEDICS | Facility: CLINIC | Age: 56
End: 2021-01-29

## 2021-02-02 ENCOUNTER — CLINICAL SUPPORT (OUTPATIENT)
Dept: REHABILITATION | Facility: HOSPITAL | Age: 56
End: 2021-02-02
Payer: MEDICAID

## 2021-02-02 DIAGNOSIS — M65.331 TRIGGER MIDDLE FINGER OF RIGHT HAND: Primary | ICD-10-CM

## 2021-02-02 PROCEDURE — 97022 WHIRLPOOL THERAPY: CPT

## 2021-02-02 PROCEDURE — 97110 THERAPEUTIC EXERCISES: CPT

## 2021-02-09 ENCOUNTER — OFFICE VISIT (OUTPATIENT)
Dept: ORTHOPEDICS | Facility: CLINIC | Age: 56
End: 2021-02-09
Payer: MEDICAID

## 2021-02-09 VITALS — HEIGHT: 70 IN | WEIGHT: 268.06 LBS | BODY MASS INDEX: 38.38 KG/M2

## 2021-02-09 DIAGNOSIS — Z98.890 S/P TRIGGER FINGER RELEASE: Primary | ICD-10-CM

## 2021-02-09 PROCEDURE — 99213 OFFICE O/P EST LOW 20 MIN: CPT | Mod: S$PBB,,, | Performed by: ORTHOPAEDIC SURGERY

## 2021-02-09 PROCEDURE — 99999 PR PBB SHADOW E&M-EST. PATIENT-LVL III: ICD-10-PCS | Mod: PBBFAC,,, | Performed by: ORTHOPAEDIC SURGERY

## 2021-02-09 PROCEDURE — 99213 PR OFFICE/OUTPT VISIT, EST, LEVL III, 20-29 MIN: ICD-10-PCS | Mod: S$PBB,,, | Performed by: ORTHOPAEDIC SURGERY

## 2021-02-09 PROCEDURE — 99999 PR PBB SHADOW E&M-EST. PATIENT-LVL III: CPT | Mod: PBBFAC,,, | Performed by: ORTHOPAEDIC SURGERY

## 2021-02-09 PROCEDURE — 99213 OFFICE O/P EST LOW 20 MIN: CPT | Mod: PBBFAC,PN | Performed by: ORTHOPAEDIC SURGERY

## 2021-03-02 ENCOUNTER — CLINICAL SUPPORT (OUTPATIENT)
Dept: REHABILITATION | Facility: HOSPITAL | Age: 56
End: 2021-03-02
Payer: MEDICAID

## 2021-03-02 DIAGNOSIS — M65.331 TRIGGER MIDDLE FINGER OF RIGHT HAND: ICD-10-CM

## 2021-03-02 DIAGNOSIS — Z98.890 S/P TRIGGER FINGER RELEASE: ICD-10-CM

## 2021-03-02 DIAGNOSIS — M25.641 DECREASED RANGE OF MOTION OF FINGER OF RIGHT HAND: Primary | ICD-10-CM

## 2021-03-02 PROCEDURE — 97110 THERAPEUTIC EXERCISES: CPT

## 2021-03-02 PROCEDURE — 97165 OT EVAL LOW COMPLEX 30 MIN: CPT

## 2021-03-02 PROCEDURE — 97022 WHIRLPOOL THERAPY: CPT

## 2021-03-29 ENCOUNTER — CLINICAL SUPPORT (OUTPATIENT)
Dept: REHABILITATION | Facility: HOSPITAL | Age: 56
End: 2021-03-29
Payer: MEDICAID

## 2021-03-29 DIAGNOSIS — M65.331 TRIGGER MIDDLE FINGER OF RIGHT HAND: ICD-10-CM

## 2021-03-29 PROCEDURE — 97530 THERAPEUTIC ACTIVITIES: CPT

## 2021-04-13 ENCOUNTER — CLINICAL SUPPORT (OUTPATIENT)
Dept: REHABILITATION | Facility: HOSPITAL | Age: 56
End: 2021-04-13
Payer: MEDICAID

## 2021-04-13 DIAGNOSIS — M65.331 TRIGGER MIDDLE FINGER OF RIGHT HAND: Primary | ICD-10-CM

## 2021-04-13 PROCEDURE — 97022 WHIRLPOOL THERAPY: CPT

## 2021-04-13 PROCEDURE — 97110 THERAPEUTIC EXERCISES: CPT

## 2021-04-27 ENCOUNTER — CLINICAL SUPPORT (OUTPATIENT)
Dept: REHABILITATION | Facility: HOSPITAL | Age: 56
End: 2021-04-27
Payer: MEDICAID

## 2021-04-27 DIAGNOSIS — M65.331 TRIGGER MIDDLE FINGER OF RIGHT HAND: Primary | ICD-10-CM

## 2021-04-27 PROCEDURE — 97022 WHIRLPOOL THERAPY: CPT

## 2021-04-27 PROCEDURE — 97110 THERAPEUTIC EXERCISES: CPT

## 2021-06-08 ENCOUNTER — OFFICE VISIT (OUTPATIENT)
Dept: ORTHOPEDICS | Facility: CLINIC | Age: 56
End: 2021-06-08
Payer: MEDICAID

## 2021-06-08 VITALS
HEART RATE: 77 BPM | DIASTOLIC BLOOD PRESSURE: 83 MMHG | SYSTOLIC BLOOD PRESSURE: 121 MMHG | WEIGHT: 268.06 LBS | BODY MASS INDEX: 38.38 KG/M2 | HEIGHT: 70 IN

## 2021-06-08 DIAGNOSIS — Z98.890 S/P TRIGGER FINGER RELEASE: Primary | ICD-10-CM

## 2021-06-08 PROCEDURE — 99213 OFFICE O/P EST LOW 20 MIN: CPT | Mod: PBBFAC,PN | Performed by: ORTHOPAEDIC SURGERY

## 2021-06-08 PROCEDURE — 99213 OFFICE O/P EST LOW 20 MIN: CPT | Mod: S$PBB,,, | Performed by: ORTHOPAEDIC SURGERY

## 2021-06-08 PROCEDURE — 99999 PR PBB SHADOW E&M-EST. PATIENT-LVL III: CPT | Mod: PBBFAC,,, | Performed by: ORTHOPAEDIC SURGERY

## 2021-06-08 PROCEDURE — 99213 PR OFFICE/OUTPT VISIT, EST, LEVL III, 20-29 MIN: ICD-10-PCS | Mod: S$PBB,,, | Performed by: ORTHOPAEDIC SURGERY

## 2021-06-08 PROCEDURE — 99999 PR PBB SHADOW E&M-EST. PATIENT-LVL III: ICD-10-PCS | Mod: PBBFAC,,, | Performed by: ORTHOPAEDIC SURGERY

## 2022-05-10 ENCOUNTER — OFFICE VISIT (OUTPATIENT)
Dept: URGENT CARE | Facility: CLINIC | Age: 57
End: 2022-05-10
Payer: MEDICAID

## 2022-05-10 VITALS
HEART RATE: 80 BPM | SYSTOLIC BLOOD PRESSURE: 143 MMHG | TEMPERATURE: 99 F | BODY MASS INDEX: 38.37 KG/M2 | HEIGHT: 70 IN | DIASTOLIC BLOOD PRESSURE: 86 MMHG | WEIGHT: 268 LBS | RESPIRATION RATE: 18 BRPM | OXYGEN SATURATION: 98 %
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
## 2022-05-10 DIAGNOSIS — R07.89 CHEST WALL PAIN: ICD-10-CM

## 2022-05-10 DIAGNOSIS — M54.50 BILATERAL LOW BACK PAIN WITHOUT SCIATICA, UNSPECIFIED CHRONICITY: ICD-10-CM

## 2022-05-10 DIAGNOSIS — M54.9 UPPER BACK PAIN: ICD-10-CM

## 2022-05-10 DIAGNOSIS — M54.2 NECK PAIN: ICD-10-CM

## 2022-05-10 PROCEDURE — 72100 XR LUMBAR SPINE 2 OR 3 VIEWS: ICD-10-PCS | Mod: FY,S$GLB,, | Performed by: RADIOLOGY

## 2022-05-10 PROCEDURE — 72100 X-RAY EXAM L-S SPINE 2/3 VWS: CPT | Mod: FY,S$GLB,, | Performed by: RADIOLOGY

## 2022-05-10 PROCEDURE — 71046 X-RAY EXAM CHEST 2 VIEWS: CPT | Mod: FY,S$GLB,, | Performed by: RADIOLOGY

## 2022-05-10 PROCEDURE — 72040 X-RAY EXAM NECK SPINE 2-3 VW: CPT | Mod: FY,S$GLB,, | Performed by: RADIOLOGY

## 2022-05-10 PROCEDURE — 71046 XR CHEST PA AND LATERAL: ICD-10-PCS | Mod: FY,S$GLB,, | Performed by: RADIOLOGY

## 2022-05-10 PROCEDURE — 99203 OFFICE O/P NEW LOW 30 MIN: CPT | Mod: S$GLB,,, | Performed by: NURSE PRACTITIONER

## 2022-05-10 PROCEDURE — 72070 X-RAY EXAM THORAC SPINE 2VWS: CPT | Mod: FY,S$GLB,, | Performed by: RADIOLOGY

## 2022-05-10 PROCEDURE — 72040 XR CERVICAL SPINE 2 OR 3 VIEWS: ICD-10-PCS | Mod: FY,S$GLB,, | Performed by: RADIOLOGY

## 2022-05-10 PROCEDURE — 72070 XR THORACIC SPINE AP LATERAL: ICD-10-PCS | Mod: FY,S$GLB,, | Performed by: RADIOLOGY

## 2022-05-10 PROCEDURE — 99203 PR OFFICE/OUTPT VISIT, NEW, LEVL III, 30-44 MIN: ICD-10-PCS | Mod: S$GLB,,, | Performed by: NURSE PRACTITIONER
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
## 2022-05-11 NOTE — PATIENT INSTRUCTIONS
You have been scheduled for multiple Xrays; we will complete these tests and let you go home then call you should the results be abnormal, you will also be able to see your results in real-time on My Chart. There is no reason for you to extend your stay here as I can order  additional therapy afterwards as well.Your test results for your xray are pending.      General Discharge Instructions   If you were prescribed a narcotic or controlled medication, do not drive or operate heavy equipment or machinery while taking these medications.  If you were prescribed antibiotics, please take them to completion.  You must understand that you've received an Urgent Care treatment only and that you may be released before all your medical problems are known or treated. You, the patient, will arrange for follow up care as instructed.  Follow up with your PCP or specialty clinic as directed in the next 1-2 weeks if not improved or as needed.  You can call [phone] to schedule an appointment with the appropriate provider.  If your condition worsens we recommend that you receive another evaluation at the emergency room immediately or contact your primary medical clinics after hours call service to discuss your concerns.  Please return here or go to the Emergency Department for any concerns or worsening of condition.         Chest Wall Pain   Please go to the Emergency Department for any concerns or worsening of condition such as:  Shortness of breath, difficulty breathing, or breathing fast  Chest pain gets worse when you breathe  Severe pain that comes on suddenly or lasts more than an hour  Dizziness, weakness, or fainting  Fever   Follow up with your PCP in the next 2-3 days for no improvement in symptoms.    Avoid any heavy lifting, pushing heavy objects or weight training during this time of present symptoms.  Avoid any strenuous activity that causes aggravation to the affected area.  Cool compresses to affected area are  helpful.  Can use a pillow to brace area when sneezing or coughing.   If you were prescribed a narcotic medication, do not drive or operate heavy equipment or machinery while taking these medications.  Please follow up with your primary care doctor or specialist in the next 48-72hrs as needed.    If you  smoke, please stop smoking.

## 2022-10-20 ENCOUNTER — OFFICE VISIT (OUTPATIENT)
Dept: URGENT CARE | Facility: CLINIC | Age: 57
End: 2022-10-20
Payer: MEDICAID

## 2022-10-20 VITALS
RESPIRATION RATE: 20 BRPM | WEIGHT: 268 LBS | BODY MASS INDEX: 38.37 KG/M2 | DIASTOLIC BLOOD PRESSURE: 70 MMHG | HEART RATE: 84 BPM | HEIGHT: 70 IN | TEMPERATURE: 98 F | SYSTOLIC BLOOD PRESSURE: 122 MMHG | OXYGEN SATURATION: 97 %

## 2022-10-20 DIAGNOSIS — R05.9 COUGH, UNSPECIFIED TYPE: ICD-10-CM

## 2022-10-20 DIAGNOSIS — J06.9 VIRAL URI WITH COUGH: Primary | ICD-10-CM

## 2022-10-20 LAB
CTP QC/QA: YES
SARS-COV-2 RDRP RESP QL NAA+PROBE: NEGATIVE

## 2022-10-20 PROCEDURE — 3074F PR MOST RECENT SYSTOLIC BLOOD PRESSURE < 130 MM HG: ICD-10-PCS | Mod: CPTII,S$GLB,, | Performed by: NURSE PRACTITIONER

## 2022-10-20 PROCEDURE — 3078F PR MOST RECENT DIASTOLIC BLOOD PRESSURE < 80 MM HG: ICD-10-PCS | Mod: CPTII,S$GLB,, | Performed by: NURSE PRACTITIONER

## 2022-10-20 PROCEDURE — 3074F SYST BP LT 130 MM HG: CPT | Mod: CPTII,S$GLB,, | Performed by: NURSE PRACTITIONER

## 2022-10-20 PROCEDURE — 99213 OFFICE O/P EST LOW 20 MIN: CPT | Mod: S$GLB,,, | Performed by: NURSE PRACTITIONER

## 2022-10-20 PROCEDURE — 1160F PR REVIEW ALL MEDS BY PRESCRIBER/CLIN PHARMACIST DOCUMENTED: ICD-10-PCS | Mod: CPTII,S$GLB,, | Performed by: NURSE PRACTITIONER

## 2022-10-20 PROCEDURE — 1159F MED LIST DOCD IN RCRD: CPT | Mod: CPTII,S$GLB,, | Performed by: NURSE PRACTITIONER

## 2022-10-20 PROCEDURE — 87635 SARS-COV-2 COVID-19 AMP PRB: CPT | Mod: QW,S$GLB,, | Performed by: NURSE PRACTITIONER

## 2022-10-20 PROCEDURE — 99213 PR OFFICE/OUTPT VISIT, EST, LEVL III, 20-29 MIN: ICD-10-PCS | Mod: S$GLB,,, | Performed by: NURSE PRACTITIONER

## 2022-10-20 PROCEDURE — 87635: ICD-10-PCS | Mod: QW,S$GLB,, | Performed by: NURSE PRACTITIONER

## 2022-10-20 PROCEDURE — 3078F DIAST BP <80 MM HG: CPT | Mod: CPTII,S$GLB,, | Performed by: NURSE PRACTITIONER

## 2022-10-20 PROCEDURE — 1159F PR MEDICATION LIST DOCUMENTED IN MEDICAL RECORD: ICD-10-PCS | Mod: CPTII,S$GLB,, | Performed by: NURSE PRACTITIONER

## 2022-10-20 PROCEDURE — 1160F RVW MEDS BY RX/DR IN RCRD: CPT | Mod: CPTII,S$GLB,, | Performed by: NURSE PRACTITIONER

## 2022-10-20 NOTE — PROGRESS NOTES
"Subjective:       Patient ID: Trudi Howard is a 56 y.o. female.    Vitals:  height is 5' 10" (1.778 m) and weight is 121.6 kg (268 lb). Her temperature is 98.1 °F (36.7 °C). Her blood pressure is 122/70 and her pulse is 84. Her respiration is 20 and oxygen saturation is 97%.     Chief Complaint: URI    Pt states she has a cough, congestion ,postnasal drip , denies fever, sx started last sat . Daughter was recently with similar symptoms and dx with viral bronchitis. No fever, chills, SOB, chest pain,     URI   This is a new problem. The current episode started in the past 7 days. The problem has been gradually worsening. Associated symptoms include congestion, coughing, headaches, sinus pain, sneezing and a sore throat. Pertinent negatives include no abdominal pain, chest pain, diarrhea, ear pain, nausea, neck pain, rash, vomiting or wheezing. She has tried acetaminophen for the symptoms.     Constitution: Negative for chills, sweating, fatigue and fever.   HENT:  Positive for congestion, sinus pain and sore throat. Negative for ear pain, ear discharge, tinnitus, hearing loss, sinus pressure, trouble swallowing and voice change.    Neck: Negative for neck pain and painful lymph nodes.   Cardiovascular:  Negative for chest pain, palpitations and sob on exertion.   Respiratory:  Positive for cough. Negative for sputum production, shortness of breath and wheezing.    Gastrointestinal:  Negative for abdominal pain, nausea, vomiting and diarrhea.   Musculoskeletal:  Negative for muscle ache.   Skin:  Negative for color change, pale and rash.   Allergic/Immunologic: Positive for sneezing.   Neurological:  Positive for headaches. Negative for numbness and tingling.   Hematologic/Lymphatic: Negative for swollen lymph nodes.     Objective:      Physical Exam   Constitutional: She is oriented to person, place, and time. She appears well-developed. She is cooperative.  Non-toxic appearance. She does not appear ill. No " distress.   HENT:   Head: Normocephalic and atraumatic.   Ears:   Right Ear: Hearing, tympanic membrane, external ear and ear canal normal.   Left Ear: Hearing, tympanic membrane, external ear and ear canal normal.   Nose: Congestion present. No mucosal edema, rhinorrhea or nasal deformity. No epistaxis. Right sinus exhibits no maxillary sinus tenderness and no frontal sinus tenderness. Left sinus exhibits no maxillary sinus tenderness and no frontal sinus tenderness.   Mouth/Throat: Uvula is midline and mucous membranes are normal. No trismus in the jaw. Normal dentition. No uvula swelling. Posterior oropharyngeal erythema present. No oropharyngeal exudate or posterior oropharyngeal edema.   Eyes: Conjunctivae and lids are normal. Right eye exhibits no discharge. Left eye exhibits no discharge. No scleral icterus.   Neck: Trachea normal and phonation normal. Neck supple. No edema present. No erythema present. No neck rigidity present.   Cardiovascular: Normal rate, regular rhythm, normal heart sounds and normal pulses.   Pulmonary/Chest: Effort normal and breath sounds normal. No stridor. No respiratory distress. She has no decreased breath sounds. She has no wheezes. She has no rhonchi. She has no rales. She exhibits no tenderness.   Abdominal: Normal appearance.   Musculoskeletal: Normal range of motion.         General: No deformity. Normal range of motion.      Cervical back: She exhibits no tenderness.   Lymphadenopathy:     She has no cervical adenopathy.   Neurological: She is alert and oriented to person, place, and time. She exhibits normal muscle tone. Coordination normal.   Skin: Skin is warm, dry, intact, not diaphoretic and not pale.   Psychiatric: Her speech is normal and behavior is normal. Judgment and thought content normal.   Nursing note and vitals reviewed.      Results for orders placed or performed in visit on 10/20/22   POCT COVID-19 Rapid Screening   Result Value Ref Range    POC Rapid COVID  Negative Negative     Acceptable Yes        Assessment:       1. Viral URI with cough    2. Cough, unspecified type          Plan:         Viral URI with cough    Cough, unspecified type  -     POCT COVID-19 Rapid Screening                 Patient Instructions   See additional patient Instructions provided    - Rest.    - Drink plenty of fluids.  - Viral upper respiratory infections typically run their course in 10-14 days.     - Tylenol or Ibuprofen as directed as needed for fever/pain. Avoid tylenol if you have a history of liver disease. Do not take ibuprofen if you have a history of GI bleeding, kidney disease, or if you take blood thinners.     - You can take over-the-counter claritin, zyrtec, allegra, or xyzal as directed. These are antihistamines that can help with runny nose, nasal congestion, sneezing, and helps to dry up post-nasal drip, which usually causes sore throat and cough.   - If you do NOT have high blood pressure, you may use a decongestant form (D)  of this medication (ie. Claritin- D, zyrtec-D, allegra-D) or if you do not take the D form, you can take sudafed (pseudoephedrine) over the counter, which is a decongestant. Do NOT take two decongestant (D) medications at the same time (such as mucinex-D and claritin-D or plain sudafed and claritin D)    - You can use Flonase (fluticasone) nasal spray as directed for sinus congestion and postnasal drip. This is a steroid nasal spray that works locally over time to decrease the inflammation in your nose/sinuses and help with allergic symptoms. This is not an quick- relief spray like afrin, but it works well if used daily.  Discontinue if you develop nose bleed  - use nasal saline prior to Flonase.  - Use Ocean Spray Nasal Saline 1-3 puffs each nostril every 2-3 hours then blow out onto tissue. This is to irrigate the nasal passage way to clear the sinus openings. Use until sinus problem resolved.    - you can take plain Mucinex  (guaifenesin) 1200 mg twice a day to help loosen mucous.     -warm salt water gargles can help with sore throat    - warm tea with honey can help with cough. Honey is a natural cough suppressant.    - Dextromethorphan (DM) is a cough suppressant over the counter (ie. mucinex DM, robitussin, delsym; dayquil/nyquil has DM as well.)    - Follow up with your PCP or specialty clinic as directed in the next 1-2 weeks if not improved or as needed.  You can call [phone] to schedule an appointment with the appropriate provider.      - Go to the ER if you develop new or worsening symptoms.     - You must understand that you have received an Urgent Care treatment only and that you may be released before all of your medical problems are known or treated.   - You, the patient, will arrange for follow up care as instructed.   - If your condition worsens or fails to improve we recommend that you receive another evaluation at the ER immediately or contact your PCP to discuss your concerns or return here.     Patient Instructions   - You must understand that you have received an Urgent Care treatment only and that you may be released before all of your medical problems are known or treated.   - You, the patient, will arrange for follow up care as instructed.   - If your condition worsens or fails to improve we recommend that you receive another evaluation at the ER immediately or contact your PCP to discuss your concerns or return here.     Advised on return/follow-up precautions. Advised on ER precautions. Answered all patient questions. Patient verbalized understanding and voiced agreement with current treatment plan.

## 2022-10-20 NOTE — PATIENT INSTRUCTIONS
See additional patient Instructions provided    - Rest.    - Drink plenty of fluids.  - Viral upper respiratory infections typically run their course in 10-14 days.     - Tylenol or Ibuprofen as directed as needed for fever/pain. Avoid tylenol if you have a history of liver disease. Do not take ibuprofen if you have a history of GI bleeding, kidney disease, or if you take blood thinners.     - You can take over-the-counter claritin, zyrtec, allegra, or xyzal as directed. These are antihistamines that can help with runny nose, nasal congestion, sneezing, and helps to dry up post-nasal drip, which usually causes sore throat and cough.   - If you do NOT have high blood pressure, you may use a decongestant form (D)  of this medication (ie. Claritin- D, zyrtec-D, allegra-D) or if you do not take the D form, you can take sudafed (pseudoephedrine) over the counter, which is a decongestant. Do NOT take two decongestant (D) medications at the same time (such as mucinex-D and claritin-D or plain sudafed and claritin D)    - You can use Flonase (fluticasone) nasal spray as directed for sinus congestion and postnasal drip. This is a steroid nasal spray that works locally over time to decrease the inflammation in your nose/sinuses and help with allergic symptoms. This is not an quick- relief spray like afrin, but it works well if used daily.  Discontinue if you develop nose bleed  - use nasal saline prior to Flonase.  - Use Ocean Spray Nasal Saline 1-3 puffs each nostril every 2-3 hours then blow out onto tissue. This is to irrigate the nasal passage way to clear the sinus openings. Use until sinus problem resolved.    - you can take plain Mucinex (guaifenesin) 1200 mg twice a day to help loosen mucous.     -warm salt water gargles can help with sore throat    - warm tea with honey can help with cough. Honey is a natural cough suppressant.    - Dextromethorphan (DM) is a cough suppressant over the counter (ie. mucinex DM,  robitussin, delsym; dayquil/nyquil has DM as well.)    - Follow up with your PCP or specialty clinic as directed in the next 1-2 weeks if not improved or as needed.  You can call [phone] to schedule an appointment with the appropriate provider.      - Go to the ER if you develop new or worsening symptoms.     - You must understand that you have received an Urgent Care treatment only and that you may be released before all of your medical problems are known or treated.   - You, the patient, will arrange for follow up care as instructed.   - If your condition worsens or fails to improve we recommend that you receive another evaluation at the ER immediately or contact your PCP to discuss your concerns or return here.     Patient Instructions   - You must understand that you have received an Urgent Care treatment only and that you may be released before all of your medical problems are known or treated.   - You, the patient, will arrange for follow up care as instructed.   - If your condition worsens or fails to improve we recommend that you receive another evaluation at the ER immediately or contact your PCP to discuss your concerns or return here.     Advised on return/follow-up precautions. Advised on ER precautions. Answered all patient questions. Patient verbalized understanding and voiced agreement with current treatment plan.

## 2022-10-20 NOTE — PROGRESS NOTES
Subjective:       Patient ID: Trudi Howard is a 56 y.o. female.    Chief Complaint: No chief complaint on file.    HPI  ROS     Objective:      Physical Exam    Assessment:       No diagnosis found.    Plan:                   No follow-ups on file.

## 2022-10-25 ENCOUNTER — OFFICE VISIT (OUTPATIENT)
Dept: URGENT CARE | Facility: CLINIC | Age: 57
End: 2022-10-25
Payer: MEDICAID

## 2022-10-25 VITALS
HEIGHT: 70 IN | SYSTOLIC BLOOD PRESSURE: 117 MMHG | OXYGEN SATURATION: 98 % | DIASTOLIC BLOOD PRESSURE: 75 MMHG | TEMPERATURE: 99 F | BODY MASS INDEX: 38.37 KG/M2 | WEIGHT: 268 LBS | HEART RATE: 70 BPM | RESPIRATION RATE: 18 BRPM

## 2022-10-25 DIAGNOSIS — J06.9 VIRAL UPPER RESPIRATORY INFECTION: Primary | ICD-10-CM

## 2022-10-25 DIAGNOSIS — R05.9 COUGH, UNSPECIFIED TYPE: ICD-10-CM

## 2022-10-25 LAB
CTP QC/QA: YES
POC MOLECULAR INFLUENZA A AGN: NEGATIVE
POC MOLECULAR INFLUENZA B AGN: NEGATIVE

## 2022-10-25 PROCEDURE — 1159F PR MEDICATION LIST DOCUMENTED IN MEDICAL RECORD: ICD-10-PCS | Mod: CPTII,S$GLB,,

## 2022-10-25 PROCEDURE — 87502 INFLUENZA DNA AMP PROBE: CPT | Mod: QW,S$GLB,,

## 2022-10-25 PROCEDURE — 3074F SYST BP LT 130 MM HG: CPT | Mod: CPTII,S$GLB,,

## 2022-10-25 PROCEDURE — 1160F RVW MEDS BY RX/DR IN RCRD: CPT | Mod: CPTII,S$GLB,,

## 2022-10-25 PROCEDURE — 87502 POCT INFLUENZA A/B MOLECULAR: ICD-10-PCS | Mod: QW,S$GLB,,

## 2022-10-25 PROCEDURE — 99213 PR OFFICE/OUTPT VISIT, EST, LEVL III, 20-29 MIN: ICD-10-PCS | Mod: S$GLB,,,

## 2022-10-25 PROCEDURE — 1159F MED LIST DOCD IN RCRD: CPT | Mod: CPTII,S$GLB,,

## 2022-10-25 PROCEDURE — 3078F DIAST BP <80 MM HG: CPT | Mod: CPTII,S$GLB,,

## 2022-10-25 PROCEDURE — 99213 OFFICE O/P EST LOW 20 MIN: CPT | Mod: S$GLB,,,

## 2022-10-25 PROCEDURE — 3074F PR MOST RECENT SYSTOLIC BLOOD PRESSURE < 130 MM HG: ICD-10-PCS | Mod: CPTII,S$GLB,,

## 2022-10-25 PROCEDURE — 3078F PR MOST RECENT DIASTOLIC BLOOD PRESSURE < 80 MM HG: ICD-10-PCS | Mod: CPTII,S$GLB,,

## 2022-10-25 PROCEDURE — 1160F PR REVIEW ALL MEDS BY PRESCRIBER/CLIN PHARMACIST DOCUMENTED: ICD-10-PCS | Mod: CPTII,S$GLB,,

## 2022-10-25 RX ORDER — CETIRIZINE HYDROCHLORIDE 10 MG/1
10 TABLET ORAL DAILY
Qty: 30 TABLET | Refills: 0 | Status: SHIPPED | OUTPATIENT
Start: 2022-10-25 | End: 2022-11-24

## 2022-10-25 RX ORDER — PROMETHAZINE HYDROCHLORIDE AND DEXTROMETHORPHAN HYDROBROMIDE 6.25; 15 MG/5ML; MG/5ML
5 SYRUP ORAL EVERY 4 HOURS PRN
Qty: 118 ML | Refills: 0 | Status: SHIPPED | OUTPATIENT
Start: 2022-10-25 | End: 2022-11-04

## 2022-10-25 NOTE — PROGRESS NOTES
"Subjective:       Patient ID: Trudi Howard is a 56 y.o. female.    Vitals:  height is 5' 10" (1.778 m) and weight is 121.6 kg (268 lb). Her temperature is 98.8 °F (37.1 °C). Her blood pressure is 117/75 and her pulse is 70. Her respiration is 18 and oxygen saturation is 98%.     Chief Complaint: Cough and Nasal Congestion    Trudi Howard is a 56 y.o. female who presents for productive cough which onset 1 week ago. Patient was seen on 10/20 for similar sxs and negative COVID-19. Associated sxs include nasal congestion. Patient denies any fever, chills, SOB, CP, abd pain, n/v/d, rash, dizziness, or numbness/tingling. Prior Tx includes home treatments with no relief. She is vaccinated. Daughter is also sick.    Cough  This is a new problem. The current episode started in the past 7 days. The problem has been unchanged. The problem occurs constantly. Associated symptoms include nasal congestion. Pertinent negatives include no chest pain, chills, ear pain, fever, headaches, myalgias, postnasal drip, sore throat or shortness of breath. Nothing aggravates the symptoms. The treatment provided no relief.     Constitution: Negative for appetite change, chills, sweating, fatigue and fever.   HENT:  Positive for congestion. Negative for ear pain, ear discharge, hearing loss, postnasal drip, sinus pain, sinus pressure, sore throat and trouble swallowing.    Cardiovascular:  Negative for chest pain.   Respiratory:  Positive for cough and sputum production. Negative for shortness of breath.    Gastrointestinal:  Negative for abdominal pain, nausea, vomiting and diarrhea.   Musculoskeletal:  Negative for muscle ache.   Neurological:  Negative for dizziness, headaches, numbness and tingling.     Objective:      Physical Exam   Constitutional: She is oriented to person, place, and time. She appears well-developed. She is cooperative.  Non-toxic appearance. She does not appear ill. No distress.   HENT:   Head: " Normocephalic and atraumatic.   Ears:   Right Ear: Hearing, tympanic membrane, external ear and ear canal normal.   Left Ear: Hearing, tympanic membrane, external ear and ear canal normal.   Nose: Nose normal. No mucosal edema, rhinorrhea or nasal deformity. No epistaxis. Right sinus exhibits no maxillary sinus tenderness and no frontal sinus tenderness. Left sinus exhibits no maxillary sinus tenderness and no frontal sinus tenderness.   Mouth/Throat: Uvula is midline, oropharynx is clear and moist and mucous membranes are normal. Mucous membranes are moist. No trismus in the jaw. Normal dentition. No uvula swelling. No oropharyngeal exudate, posterior oropharyngeal edema or posterior oropharyngeal erythema.   Eyes: Conjunctivae and lids are normal. No scleral icterus.   Neck: Trachea normal and phonation normal. Neck supple. No edema present. No erythema present. No neck rigidity present.   Cardiovascular: Normal rate, regular rhythm, normal heart sounds and normal pulses.   Pulmonary/Chest: Effort normal and breath sounds normal. No respiratory distress. She has no decreased breath sounds. She has no wheezes. She has no rhonchi. She has no rales.         Comments: Clear to ausculation. No wheezes, crackles, rales, or rhonchi.    Abdominal: Normal appearance.   Musculoskeletal: Normal range of motion.         General: No deformity. Normal range of motion.   Neurological: She is alert and oriented to person, place, and time. She exhibits normal muscle tone. Coordination normal.   Skin: Skin is warm, dry, intact, not diaphoretic and not pale.   Psychiatric: Her speech is normal and behavior is normal. Judgment and thought content normal.   Nursing note and vitals reviewed.      Assessment:       1. Viral upper respiratory infection    2. Cough, unspecified type          Results for orders placed or performed in visit on 10/25/22   POCT Influenza A/B MOLECULAR   Result Value Ref Range    POC Molecular Influenza A Ag  Negative Negative, Not Reported    POC Molecular Influenza B Ag Negative Negative, Not Reported     Acceptable Yes        Plan:         Viral upper respiratory infection  -     cetirizine (ZYRTEC) 10 MG tablet; Take 1 tablet (10 mg total) by mouth once daily.  Dispense: 30 tablet; Refill: 0    Cough, unspecified type  -     POCT Influenza A/B MOLECULAR  -     promethazine-dextromethorphan (PROMETHAZINE-DM) 6.25-15 mg/5 mL Syrp; Take 5 mLs by mouth every 4 (four) hours as needed (as needed for cough).  Dispense: 118 mL; Refill: 0    Discussed negative results with patient. Patient verbalized understanding. Educated patient on viral vs bacterial sinus infection/upper respiratory infection. Advised patient to begin OTC zyrtec with decongestant, flonase, normal saline nasal spray and OTC cough suppressants for symptom relief. If symptoms do not resolve, return to clinic for further evaluation. Patient vitals stable. Patient in no acute respiratory distress. Discussed discharge instructions with patient, he has no further questions at this time. Patient exits exam room in no distress.     Discussed with patient all pertinent information and results. Discussed patient diagnosis and plan of treatment. Patient was given all follow up and return instructions. All questions and concerns were addressed at this time. Patient expresses understanding of information and instructions, and is comfortable with plan.    Patient was instructed to return to clinic or go to ED immediately for any worsening or change in current symptoms.    Patient Instructions   PLEASE READ YOUR DISCHARGE INSTRUCTIONS ENTIRELY AS IT CONTAINS IMPORTANT INFORMATION.    Please drink plenty of fluids.    Please get plenty of rest.    Please return here or go to the Emergency Department for any concerns or worsening of condition.    Please take an over the counter antihistamine medication (Allegra/Claritin/Zyrtec) of your choice as directed for  allergy symptoms and/or runny nose and postnasal drip.    Try an over the counter decongestant for sinus pressure/ear pressure, congestion symptoms like Mucinex D or Sudafed or Phenylephrine. You buy this behind the pharmacy counter.    If you do have Hypertension or palpitations, it is safe to take Coricidin HBP for relief of sinus symptoms.    Tylenol or ibuprofen can also be used as directed for pain and fever unless you have an allergy to them or medical condition such as stomach ulcers, kidney or liver disease or blood thinners etc for which you should not be taking these type of medications.     Sore throat recommendations: Warm fluids, warm salt water gargles, throat lozenges, tea, honey, soup, rest, hydration.    Use over the counter Flonase or Nasocort: one spray each nostril twice daily OR two sprays each nostril once daily until nares dry out, unless you have Glaucoma.   Can also supplement with nasal saline rinse.    Sinus rinses DO NOT USE TAP WATER, if you must, water must be at a rolling boil for 1 minute, let it cool, then use.  May use distilled water, or over the counter nasal saline rinses.  Terry's vapor rub in shower to help open nasal passages.  May use nasal gel to keep passages moisturized.  May use nasal saline sprays during the day for added relief of congestion.   For those who go to the gym, please do not use the sauna or steam room now to clear sinuses.    Cough     Rest and fluids are important  Can use honey with carlo to soothe your throat    Robitussin or Delsyum for cough suppressant for dry cough.    Mucinex DM or products containing Guaifenesin or Dextromethorphan for expectorant (wet cough).    Take prescription cough meds (pills) as prescribed; take prescription cough syrup at night as needed for cough.  Do not take both the prescribed cough pills and syrup at the same time or within 6 hours of each other.  Do not take the cough syrup with any other sedative medication as it can  can cause drowsiness. Do not operate any heavy machinery, drink or drive while taking the cough syrup.     Please follow up with your primary care doctor or specialist in the next 48-72hrs as needed and if no improvement    If you smoke, please stop smoking.    Please return or see your primary care doctor if you develop new or worsening symptoms.     Please arrange follow up with your primary medical clinic as soon as possible. You must understand that you've received an Urgent Care treatment only and that you may be released before all of your medical problems are known or treated. You, the patient, will arrange for follow up as instructed. If your symptoms worsen or fail to improve you should go to the Emergency Room.

## 2022-10-26 NOTE — PATIENT INSTRUCTIONS
PLEASE READ YOUR DISCHARGE INSTRUCTIONS ENTIRELY AS IT CONTAINS IMPORTANT INFORMATION.    Please drink plenty of fluids.    Please get plenty of rest.    Please return here or go to the Emergency Department for any concerns or worsening of condition.    Please take an over the counter antihistamine medication (Allegra/Claritin/Zyrtec) of your choice as directed for allergy symptoms and/or runny nose and postnasal drip.    Try an over the counter decongestant for sinus pressure/ear pressure, congestion symptoms like Mucinex D or Sudafed or Phenylephrine. You buy this behind the pharmacy counter.    If you do have Hypertension or palpitations, it is safe to take Coricidin HBP for relief of sinus symptoms.    Tylenol or ibuprofen can also be used as directed for pain and fever unless you have an allergy to them or medical condition such as stomach ulcers, kidney or liver disease or blood thinners etc for which you should not be taking these type of medications.     Sore throat recommendations: Warm fluids, warm salt water gargles, throat lozenges, tea, honey, soup, rest, hydration.    Use over the counter Flonase or Nasocort: one spray each nostril twice daily OR two sprays each nostril once daily until nares dry out, unless you have Glaucoma.   Can also supplement with nasal saline rinse.    Sinus rinses DO NOT USE TAP WATER, if you must, water must be at a rolling boil for 1 minute, let it cool, then use.  May use distilled water, or over the counter nasal saline rinses.  Terry's vapor rub in shower to help open nasal passages.  May use nasal gel to keep passages moisturized.  May use nasal saline sprays during the day for added relief of congestion.   For those who go to the gym, please do not use the sauna or steam room now to clear sinuses.    Cough     Rest and fluids are important  Can use honey with carlo to soothe your throat    Robitussin or Delsyum for cough suppressant for dry cough.    Mucinex DM or  products containing Guaifenesin or Dextromethorphan for expectorant (wet cough).    Take prescription cough meds (pills) as prescribed; take prescription cough syrup at night as needed for cough.  Do not take both the prescribed cough pills and syrup at the same time or within 6 hours of each other.  Do not take the cough syrup with any other sedative medication as it can can cause drowsiness. Do not operate any heavy machinery, drink or drive while taking the cough syrup.     Please follow up with your primary care doctor or specialist in the next 48-72hrs as needed and if no improvement    If you smoke, please stop smoking.    Please return or see your primary care doctor if you develop new or worsening symptoms.     Please arrange follow up with your primary medical clinic as soon as possible. You must understand that you've received an Urgent Care treatment only and that you may be released before all of your medical problems are known or treated. You, the patient, will arrange for follow up as instructed. If your symptoms worsen or fail to improve you should go to the Emergency Room.

## 2023-01-01 ENCOUNTER — OFFICE VISIT (OUTPATIENT)
Dept: URGENT CARE | Facility: CLINIC | Age: 58
End: 2023-01-01
Payer: MEDICAID

## 2023-01-01 VITALS
RESPIRATION RATE: 18 BRPM | WEIGHT: 268 LBS | HEART RATE: 80 BPM | SYSTOLIC BLOOD PRESSURE: 130 MMHG | OXYGEN SATURATION: 98 % | TEMPERATURE: 98 F | BODY MASS INDEX: 38.37 KG/M2 | DIASTOLIC BLOOD PRESSURE: 70 MMHG | HEIGHT: 70 IN

## 2023-01-01 DIAGNOSIS — R05.9 COUGH, UNSPECIFIED TYPE: Primary | ICD-10-CM

## 2023-01-01 DIAGNOSIS — U07.1 LAB TEST POSITIVE FOR DETECTION OF COVID-19 VIRUS: ICD-10-CM

## 2023-01-01 LAB
CTP QC/QA: YES
SARS-COV-2 AG RESP QL IA.RAPID: POSITIVE

## 2023-01-01 PROCEDURE — 3078F DIAST BP <80 MM HG: CPT | Mod: CPTII,S$GLB,, | Performed by: FAMILY MEDICINE

## 2023-01-01 PROCEDURE — 3075F SYST BP GE 130 - 139MM HG: CPT | Mod: CPTII,S$GLB,, | Performed by: FAMILY MEDICINE

## 2023-01-01 PROCEDURE — 87811 SARS-COV-2 COVID19 W/OPTIC: CPT | Mod: QW,S$GLB,, | Performed by: FAMILY MEDICINE

## 2023-01-01 PROCEDURE — 87811 SARS CORONAVIRUS 2 ANTIGEN POCT, MANUAL READ: ICD-10-PCS | Mod: QW,S$GLB,, | Performed by: FAMILY MEDICINE

## 2023-01-01 PROCEDURE — 3075F PR MOST RECENT SYSTOLIC BLOOD PRESS GE 130-139MM HG: ICD-10-PCS | Mod: CPTII,S$GLB,, | Performed by: FAMILY MEDICINE

## 2023-01-01 PROCEDURE — 3008F PR BODY MASS INDEX (BMI) DOCUMENTED: ICD-10-PCS | Mod: CPTII,S$GLB,, | Performed by: FAMILY MEDICINE

## 2023-01-01 PROCEDURE — 99213 PR OFFICE/OUTPT VISIT, EST, LEVL III, 20-29 MIN: ICD-10-PCS | Mod: S$GLB,,, | Performed by: FAMILY MEDICINE

## 2023-01-01 PROCEDURE — 3008F BODY MASS INDEX DOCD: CPT | Mod: CPTII,S$GLB,, | Performed by: FAMILY MEDICINE

## 2023-01-01 PROCEDURE — 3078F PR MOST RECENT DIASTOLIC BLOOD PRESSURE < 80 MM HG: ICD-10-PCS | Mod: CPTII,S$GLB,, | Performed by: FAMILY MEDICINE

## 2023-01-01 PROCEDURE — 99213 OFFICE O/P EST LOW 20 MIN: CPT | Mod: S$GLB,,, | Performed by: FAMILY MEDICINE

## 2023-01-01 RX ORDER — BENZONATATE 100 MG/1
200 CAPSULE ORAL 3 TIMES DAILY PRN
Qty: 30 CAPSULE | Refills: 1 | Status: SHIPPED | OUTPATIENT
Start: 2023-01-01

## 2023-01-01 RX ORDER — BENZONATATE 100 MG/1
200 CAPSULE ORAL 3 TIMES DAILY PRN
Qty: 30 CAPSULE | Refills: 1 | Status: SHIPPED | OUTPATIENT
Start: 2023-01-01 | End: 2023-01-01 | Stop reason: SDUPTHER

## 2023-01-01 RX ORDER — LORATADINE 10 MG/1
10 TABLET ORAL DAILY
Qty: 30 TABLET | Refills: 3 | Status: SHIPPED | OUTPATIENT
Start: 2023-01-01 | End: 2024-01-01

## 2023-01-01 NOTE — PROGRESS NOTES
Subjective:       Patient ID: Trudi Howard is a 57 y.o. female.    Chief Complaint: No chief complaint on file.    HPI  ROS     Objective:      Physical Exam    Assessment:       No diagnosis found.    Plan:                   No follow-ups on file.

## 2023-01-01 NOTE — PATIENT INSTRUCTIONS
.ou    Your test was POSITIVE for COVID-19 (coronavirus).       Please isolate yourself at home.  You may leave home and/or return to work once the following conditions are met:    If you were not hospitalized and are not moderately to severely immunocompromised:   More than 5 days since symptoms first appeared AND  More than 24 hours fever free without medications AND  Symptoms are improving  Continue to wear a mask around others for 5 additional days.    If you were hospitalized OR are moderately to severely immunocompromised:  More than 20 days since symptoms first appeared  More than 24 hours fever free without medications  Symptoms have improved    If you had no symptoms but tested positive:  More than 5 days since the date of the first positive test (20 days if moderately to severely immunocompromised). If you develop symptoms, then use the guidelines above.  Continue to wear a mask around others for 5 additional days.      Contact Tracing    As one of the next steps, you will receive a call or text from the [place] Department of Health ([facility]) COVID-19 Tracing Team. See the contact information below so you know not to ignore the health departments call. It is important that you contact them back immediately so they can help.      Contact Tracer Number:  [phone]  Caller ID for most carriers: [place] Dept Health     What is contact tracing?  Contact tracing is a process that helps identify everyone who has been in close contact with an infected person. Contact tracers let those people know they may have been exposed and guide them on next steps. Confidentiality is important for everyone; no one will be told who may have exposed them to the virus.  Your involvement is important. The more we know about where and how this virus is spreading, the better chance we have at stopping it from spreading further.  What does exposure mean?  Exposure means you have been within 6 feet for more than 15 minutes with a  person who has or had COVID-19.  What kind of questions do the contact tracers ask?  A contact tracer will confirm your basic contact information including name, address, phone number, and next of kin, as well as asking about any symptoms you may have had. Theyll also ask you how you think you may have gotten sick, such as places where you may have been exposed to the virus, and people you were with. Those names will never be shared with anyone outside of that call, and will only be used to help trace and stop the spread of the virus.   I have privacy concerns. How will the state use my information?  Your privacy about your health is important. All calls are completed using call centers that use the appropriate health privacy protection measures (HIPAA compliance), meaning that your patient information is safe. No one will ever ask you any questions related to immigration status. Your health comes first.   Do I have to participate?  You do not have to participate, but we strongly encourage you to. Contact tracing can help us catch and control new outbreaks as theyre developing to keep your friends and family safe.   What if I dont hear from anyone?  If you dont receive a call within 24 hours, you can call the number above right away to inquire about your status. That line is open from 8:00 am - 8:00 p.m., 7 days a week.  Contact tracing saves lives! Together, we have the power to beat this virus and keep our loved ones and neighbors safe.    For more information see CDC link below.      https://www.cdc.gov/coronavirus/2019-ncov/hcp/guidance-prevent-spread.html#precautions        Sources:  [facility], [facility] and [facility]           Sincerely,     Mike Navarro MD

## 2023-01-01 NOTE — PROGRESS NOTES
"Subjective:       Patient ID: Trudi Howard is a 57 y.o. female.    Vitals:  height is 5' 10" (1.778 m) and weight is 121.6 kg (268 lb). Her temperature is 98.2 °F (36.8 °C). Her blood pressure is 130/70 and her pulse is 80. Her respiration is 18 and oxygen saturation is 98%.     Chief Complaint: URI and + covid at home     Pt states she has a cough and congestion, she did a home test and was positive  yesterday , denies fever , son tested covid positive yesterday , headache and cough x yesterday,  Vaccinated      URI   This is a new problem. The current episode started yesterday. The problem has been gradually worsening. There has been no fever. Associated symptoms include congestion, coughing, headaches, sinus pain and sneezing.     HENT:  Positive for congestion and sinus pain.    Respiratory:  Positive for cough.    Allergic/Immunologic: Positive for sneezing.   Neurological:  Positive for headaches.     Objective:      Physical Exam   Constitutional: She is oriented to person, place, and time. She appears well-developed. She is cooperative.  Non-toxic appearance. She does not appear ill. No distress.   HENT:   Head: Normocephalic and atraumatic.   Ears:   Right Ear: Hearing, tympanic membrane, external ear and ear canal normal.   Left Ear: Hearing, tympanic membrane, external ear and ear canal normal.   Nose: Nose normal. No mucosal edema, rhinorrhea or nasal deformity. No epistaxis. Right sinus exhibits no maxillary sinus tenderness and no frontal sinus tenderness. Left sinus exhibits no maxillary sinus tenderness and no frontal sinus tenderness.   Mouth/Throat: Uvula is midline, oropharynx is clear and moist and mucous membranes are normal. Mucous membranes are moist. No trismus in the jaw. Normal dentition. No uvula swelling. No posterior oropharyngeal erythema. Oropharynx is clear.   Eyes: Conjunctivae and lids are normal. Pupils are equal, round, and reactive to light. Right eye exhibits no " discharge. Left eye exhibits no discharge. No scleral icterus. Extraocular movement intact   Neck: Trachea normal and phonation normal. Neck supple.   Cardiovascular: Normal rate, regular rhythm, normal heart sounds and normal pulses.   Pulmonary/Chest: Effort normal and breath sounds normal. No respiratory distress.   Abdominal: Normal appearance and bowel sounds are normal. She exhibits no distension and no mass. Soft. There is no abdominal tenderness.   Musculoskeletal: Normal range of motion.         General: No deformity. Normal range of motion.   Neurological: She is alert and oriented to person, place, and time. She exhibits normal muscle tone. Coordination normal.   Skin: Skin is warm, dry, intact, not diaphoretic and not pale.   Psychiatric: Her speech is normal and behavior is normal. Judgment and thought content normal.   Nursing note and vitals reviewed.      Assessment:       1. Cough, unspecified type    2. Lab test positive for detection of COVID-19 virus          Plan:         Cough, unspecified type  -     SARS Coronavirus 2 Antigen, POCT Manual Read    Lab test positive for detection of COVID-19 virus                  Results for orders placed or performed in visit on 01/01/23   SARS Coronavirus 2 Antigen, POCT Manual Read   Result Value Ref Range    SARS Coronavirus 2 Antigen Positive (A) Negative     Acceptable Yes        Quarantine for 5 days

## 2023-01-08 ENCOUNTER — OFFICE VISIT (OUTPATIENT)
Dept: URGENT CARE | Facility: CLINIC | Age: 58
End: 2023-01-08
Payer: MEDICAID

## 2023-01-08 VITALS
HEIGHT: 70 IN | WEIGHT: 268 LBS | OXYGEN SATURATION: 97 % | SYSTOLIC BLOOD PRESSURE: 114 MMHG | DIASTOLIC BLOOD PRESSURE: 80 MMHG | BODY MASS INDEX: 38.37 KG/M2 | HEART RATE: 73 BPM | TEMPERATURE: 99 F | RESPIRATION RATE: 18 BRPM

## 2023-01-08 DIAGNOSIS — U07.1 COVID: Primary | ICD-10-CM

## 2023-01-08 PROCEDURE — 99213 PR OFFICE/OUTPT VISIT, EST, LEVL III, 20-29 MIN: ICD-10-PCS | Mod: S$GLB,,,

## 2023-01-08 PROCEDURE — 1159F MED LIST DOCD IN RCRD: CPT | Mod: CPTII,S$GLB,,

## 2023-01-08 PROCEDURE — 1160F RVW MEDS BY RX/DR IN RCRD: CPT | Mod: CPTII,S$GLB,,

## 2023-01-08 PROCEDURE — 3079F DIAST BP 80-89 MM HG: CPT | Mod: CPTII,S$GLB,,

## 2023-01-08 PROCEDURE — 1160F PR REVIEW ALL MEDS BY PRESCRIBER/CLIN PHARMACIST DOCUMENTED: ICD-10-PCS | Mod: CPTII,S$GLB,,

## 2023-01-08 PROCEDURE — 3008F PR BODY MASS INDEX (BMI) DOCUMENTED: ICD-10-PCS | Mod: CPTII,S$GLB,,

## 2023-01-08 PROCEDURE — 3008F BODY MASS INDEX DOCD: CPT | Mod: CPTII,S$GLB,,

## 2023-01-08 PROCEDURE — 3079F PR MOST RECENT DIASTOLIC BLOOD PRESSURE 80-89 MM HG: ICD-10-PCS | Mod: CPTII,S$GLB,,

## 2023-01-08 PROCEDURE — 1159F PR MEDICATION LIST DOCUMENTED IN MEDICAL RECORD: ICD-10-PCS | Mod: CPTII,S$GLB,,

## 2023-01-08 PROCEDURE — 3074F PR MOST RECENT SYSTOLIC BLOOD PRESSURE < 130 MM HG: ICD-10-PCS | Mod: CPTII,S$GLB,,

## 2023-01-08 PROCEDURE — 99213 OFFICE O/P EST LOW 20 MIN: CPT | Mod: S$GLB,,,

## 2023-01-08 PROCEDURE — 3074F SYST BP LT 130 MM HG: CPT | Mod: CPTII,S$GLB,,

## 2023-01-08 RX ORDER — FLUTICASONE PROPIONATE 50 MCG
1 SPRAY, SUSPENSION (ML) NASAL DAILY
Qty: 9.9 ML | Refills: 0 | Status: SHIPPED | OUTPATIENT
Start: 2023-01-08

## 2023-01-08 NOTE — PATIENT INSTRUCTIONS
You have tested positive for COVID-19 today.      ISOLATION  If you tested positive and do not have symptoms, you must isolate for 5 days starting on the day of the positive test. I    If you tested positive and have symptoms, you must isolate for 5 days starting on the day of the first symptoms,  not the day of the positive test.     Both the CDC and the [facility] emphasize that you do not test out of isolation.     After 5 days, if your symptoms have improved and you have not had fever on day 5, you can return to the community on day 6- NO TESTING REQUIRED!      In fact, we do not retest if you were positive in the last 90 days.    After your 5 days of isolation are completed, the CDC recommends strict mask use for the first 5 days that you come out of isolation.    Patient Instructions   PLEASE READ YOUR DISCHARGE INSTRUCTIONS ENTIRELY AS IT CONTAINS IMPORTANT INFORMATION.     Please drink plenty of fluids.     Please get plenty of rest.     Please return here or go to the Emergency Department for any concerns or worsening of condition.     Please take an over the counter antihistamine medication (allegra/Claritin/Zyrtec) of your choice as directed.     Try an over the counter decongestant like Mucinex D or Sudafed. You buy this behind the pharmacy counter     If you do have Hypertension or palpitations, it is safe to take Coricidin HBP for relief of sinus symptoms.     If not allergic, please take over the counter Tylenol (Acetaminophen) and/or Motrin (Ibuprofen) as directed for control of pain and/or fever.  Please follow up with your primary care doctor or specialist as needed.     Sore throat recommendations: Warm fluids, warm salt water gargles, throat lozenges, tea, honey, soup, rest, hydration.     Use over the counter flonase: one spray each nostril twice daily OR two sprays each nostril once daily.      If you  smoke, please stop smoking.     Please return or see your primary care doctor if you develop new or  worsening symptoms.      Please arrange follow up with your primary medical clinic as soon as possible. You must understand that you've received an Urgent Care treatment only and that you may be released before all of your medical problems are known or treated. You, the patient, will arrange for follow up as instructed. If your symptoms worsen or fail to improve you should go to the Emergency Room.

## 2023-01-08 NOTE — PROGRESS NOTES
"Subjective:       Patient ID: Trudi Howard is a 57 y.o. female.    Vitals:  height is 5' 10" (1.778 m) and weight is 121.6 kg (268 lb). Her tympanic temperature is 98.6 °F (37 °C). Her blood pressure is 114/80 and her pulse is 73. Her respiration is 18 and oxygen saturation is 97%.     Chief Complaint: Cough (headaches)    This is a 57 y.o. female who presents today with a chief complaint of coughing and headaches that started over a week ago. Pt explains that she tested positive for Covid last week. Patient denies any shortness of breath, wheezing, chest pain, or dizziness. Patient reports that symptoms have improved but she still has a cough and headaches.      Cough  This is a new problem. The current episode started in the past 7 days. The problem has been unchanged. The problem occurs constantly. The cough is Non-productive. Associated symptoms include headaches. Pertinent negatives include no chest pain, chills or fever. Nothing aggravates the symptoms. She has tried OTC cough suppressant for the symptoms.     Constitution: Negative for chills, fatigue and fever.   HENT:  Positive for congestion.    Cardiovascular:  Negative for chest pain.   Respiratory:  Positive for cough.    Gastrointestinal:  Negative for nausea, vomiting and diarrhea.   Neurological:  Positive for headaches. Negative for dizziness and light-headedness.     Objective:      Physical Exam   Constitutional: She is oriented to person, place, and time. She appears well-developed. She is cooperative.  Non-toxic appearance. She does not appear ill. No distress.      Comments:Patient sitting in chair with no signs of distress. Patient able to complete sentences without pausing.       HENT:   Head: Normocephalic and atraumatic.   Ears:   Right Ear: Hearing, tympanic membrane, external ear and ear canal normal.   Left Ear: Hearing, tympanic membrane, external ear and ear canal normal.   Nose: Nose normal. No mucosal edema, rhinorrhea or " nasal deformity. No epistaxis. Right sinus exhibits no maxillary sinus tenderness and no frontal sinus tenderness. Left sinus exhibits no maxillary sinus tenderness and no frontal sinus tenderness.   Mouth/Throat: Uvula is midline and mucous membranes are normal. No trismus in the jaw. Normal dentition. No uvula swelling. Posterior oropharyngeal erythema present. No oropharyngeal exudate or posterior oropharyngeal edema.   Eyes: Conjunctivae and lids are normal. No scleral icterus.   Neck: Trachea normal and phonation normal. Neck supple. No edema present. No erythema present. No neck rigidity present.   Cardiovascular: Normal rate, regular rhythm, normal heart sounds and normal pulses.   Pulmonary/Chest: Effort normal and breath sounds normal. No respiratory distress. She has no decreased breath sounds. She has no rhonchi.   Abdominal: Normal appearance.   Musculoskeletal: Normal range of motion.         General: No deformity. Normal range of motion.   Neurological: She is alert and oriented to person, place, and time. She exhibits normal muscle tone. Coordination normal.   Skin: Skin is warm, dry, intact, not diaphoretic and not pale.   Psychiatric: Her speech is normal and behavior is normal. Judgment and thought content normal.   Nursing note and vitals reviewed.      Assessment:       1. COVID          Plan:         COVID  -     fluticasone propionate (FLONASE) 50 mcg/actuation nasal spray; 1 spray (50 mcg total) by Each Nostril route once daily.  Dispense: 9.9 mL; Refill: 0         Medical Decision Making:   Urgent Care Management:  Vitals reassuring. Lungs CTA. Continue treatment regimen that was discussed last week. Discussed OTC medications with patient. Discussed the importance of further evaluation if symptoms worsen. Patient stated verbal understanding.       Patient Instructions   You have tested positive for COVID-19 today.      ISOLATION  If you tested positive and do not have symptoms, you must  isolate for 5 days starting on the day of the positive test. I    If you tested positive and have symptoms, you must isolate for 5 days starting on the day of the first symptoms,  not the day of the positive test.     Both the CDC and the [facility] emphasize that you do not test out of isolation.     After 5 days, if your symptoms have improved and you have not had fever on day 5, you can return to the community on day 6- NO TESTING REQUIRED!      In fact, we do not retest if you were positive in the last 90 days.    After your 5 days of isolation are completed, the CDC recommends strict mask use for the first 5 days that you come out of isolation.    Patient Instructions   PLEASE READ YOUR DISCHARGE INSTRUCTIONS ENTIRELY AS IT CONTAINS IMPORTANT INFORMATION.     Please drink plenty of fluids.     Please get plenty of rest.     Please return here or go to the Emergency Department for any concerns or worsening of condition.     Please take an over the counter antihistamine medication (allegra/Claritin/Zyrtec) of your choice as directed.     Try an over the counter decongestant like Mucinex D or Sudafed. You buy this behind the pharmacy counter     If you do have Hypertension or palpitations, it is safe to take Coricidin HBP for relief of sinus symptoms.     If not allergic, please take over the counter Tylenol (Acetaminophen) and/or Motrin (Ibuprofen) as directed for control of pain and/or fever.  Please follow up with your primary care doctor or specialist as needed.     Sore throat recommendations: Warm fluids, warm salt water gargles, throat lozenges, tea, honey, soup, rest, hydration.     Use over the counter flonase: one spray each nostril twice daily OR two sprays each nostril once daily.      If you  smoke, please stop smoking.     Please return or see your primary care doctor if you develop new or worsening symptoms.      Please arrange follow up with your primary medical clinic as soon as possible. You must  understand that you've received an Urgent Care treatment only and that you may be released before all of your medical problems are known or treated. You, the patient, will arrange for follow up as instructed. If your symptoms worsen or fail to improve you should go to the Emergency Room.

## 2023-01-08 NOTE — LETTER
January 8, 2023      [facility]  [address] [address]  Phone: [phone]  Fax: [phone]       Patient: Trudi Howard   YOB: 1965  Date of Visit: 01/08/2023    To Whom It May Concern:    Holden Howard  was at [facility] on 01/08/2023. The patient may return to work/school on 1/17/23 with no restrictions. If you have any questions or concerns, or if I can be of further assistance, please do not hesitate to contact me.    Sincerely,    Blanka Hammond, NP

## 2023-12-10 ENCOUNTER — HOSPITAL ENCOUNTER (EMERGENCY)
Facility: HOSPITAL | Age: 58
Discharge: HOME | End: 2023-12-10
Attending: EMERGENCY MEDICINE
Payer: MEDICAID

## 2023-12-10 VITALS
BODY MASS INDEX: 40.94 KG/M2 | HEIGHT: 70 IN | HEART RATE: 78 BPM | DIASTOLIC BLOOD PRESSURE: 87 MMHG | WEIGHT: 286 LBS | RESPIRATION RATE: 19 BRPM | OXYGEN SATURATION: 98 % | TEMPERATURE: 98 F | SYSTOLIC BLOOD PRESSURE: 145 MMHG
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
## 2023-12-10 DIAGNOSIS — M62.838 MUSCLE SPASM: ICD-10-CM

## 2023-12-10 DIAGNOSIS — M25.512 ACUTE PAIN OF LEFT SHOULDER: ICD-10-CM

## 2023-12-10 PROCEDURE — 25000003 PHARM REV CODE 250: Performed by: NURSE PRACTITIONER

## 2023-12-10 PROCEDURE — 99284 EMERGENCY DEPT VISIT MOD MDM: CPT

## 2023-12-10 RX ORDER — METHOCARBAMOL 500 MG/1
500 TABLET, FILM COATED ORAL 3 TIMES DAILY
Qty: 15 TABLET | Refills: 0 | Status: SHIPPED | OUTPATIENT
Start: 2023-12-10 | End: 2023-12-15

## 2023-12-10 RX ORDER — OXYCODONE AND ACETAMINOPHEN 5; 325 MG/1; MG/1
1 TABLET ORAL
Status: COMPLETED | OUTPATIENT
Start: 2023-12-10 | End: 2023-12-10

## 2023-12-10 RX ORDER — TRAMADOL HYDROCHLORIDE 50 MG/1
50 TABLET ORAL EVERY 6 HOURS PRN
Qty: 12 TABLET | Refills: 0 | Status: SHIPPED | OUTPATIENT
Start: 2023-12-10 | End: 2023-12-13

## 2023-12-10 RX ORDER — METHOCARBAMOL 500 MG/1
500 TABLET, FILM COATED ORAL
Status: COMPLETED | OUTPATIENT
Start: 2023-12-10 | End: 2023-12-10

## 2023-12-10 RX ADMIN — OXYCODONE HYDROCHLORIDE AND ACETAMINOPHEN 1 TABLET: 5; 325 TABLET ORAL at 05:12

## 2023-12-10 RX ADMIN — METHOCARBAMOL 500 MG: 500 TABLET ORAL at 05:12

## 2023-12-10 NOTE — ED PROVIDER NOTES
Encounter Date: 12/10/2023       History     Chief Complaint   Patient presents with    Fall     TRIP AND FALL OVER DOG. 3 WEEKS AGO    Shoulder Injury     LEFT. NO LOC     Complaint of a fall 3 weeks ago.  She reports she tripped over her dog.  She can not remember the name of the medication she took but states it was about 8 years old.  Complaint left shoulder pain and upper back pain.  Patient was offered x-rays.  She does not believe anything is broken.  She is here for pain control.      Review of patient's allergies indicates:   Allergen Reactions    Aspirin      Contraindicated with other meds    Amoxicillin      rash    Milk containing products (dairy)     Peanut      Past Medical History:   Diagnosis Date    History of uterine fibroid     Intra-abdominal abscess 2017    Urinary retention, ruptured bladder, infected urinoma, sepsis    Morbid obesity     RUSSELL (obstructive sleep apnea)     Ovarian cyst 2017    Seizures      Past Surgical History:   Procedure Laterality Date    BREAST SURGERY      BUNIONECTOMY       SECTION      Exploratory Laparotomy w/ Abscess Drainage  2017        fibroidectomy      INCISIONAL HERNIA REPAIR  2019    Laparoscopic-     TRIGGER FINGER RELEASE Right 2020    Procedure: RELEASE, TRIGGER FINGER;  Surgeon: Kenneth Tang Jr., MD;  Location: [facility];  Service: Orthopedics;  Laterality: Right;  right middle and ring fingers     Family History   Adopted: Yes   Problem Relation Age of Onset    Heart disease Mother      Social History     Tobacco Use    Smoking status: Never     Passive exposure: Never    Smokeless tobacco: Never   Substance Use Topics    Alcohol use: Yes     Comment: seldom    Drug use: No     Review of Systems   Constitutional:  Negative for fever.   Respiratory:  Negative for cough, shortness of breath and wheezing.    Cardiovascular:  Negative for chest pain, palpitations and leg swelling.   Gastrointestinal:  Negative  for abdominal pain, diarrhea, nausea and vomiting.   Genitourinary:  Negative for dysuria.   Musculoskeletal:  Positive for back pain. Negative for gait problem.        Left shoulder pain   Skin:  Negative for rash.   Neurological:  Negative for weakness.       Physical Exam     Initial Vitals [12/10/23 1635]   BP Pulse Resp Temp SpO2   (!) 142/86 77 18 98 °F (36.7 °C) 97 %      MAP       --         Physical Exam    Constitutional: She appears well-developed and well-nourished.   HENT:   Head: Normocephalic and atraumatic.   Eyes: Conjunctivae are normal.   Neck: Neck supple.   Negative for bony tenderness.   Normal range of motion.  Cardiovascular:  Normal rate and regular rhythm.           Pulmonary/Chest: Breath sounds normal. No respiratory distress.   Musculoskeletal:         General: Normal range of motion.      Cervical back: Normal range of motion and neck supple.      Comments: Patient is able to raise her left shoulder above her head.  It does elicit pain.  She has full range of motion to that shoulder.  There is a muscle spasm to bilateral trapezius muscles on palpation.  There is no bony tenderness.     Neurological: She is alert and oriented to person, place, and time. No sensory deficit. GCS score is 15. GCS eye subscore is 4. GCS verbal subscore is 5. GCS motor subscore is 6.   Skin: Skin is warm and dry. Capillary refill takes less than 2 seconds.   Psychiatric: She has a normal mood and affect. Thought content normal.         ED Course   Procedures  Labs Reviewed - No data to display       Imaging Results    None          Medications   methocarbamoL tablet 500 mg (has no administration in time range)   oxyCODONE-acetaminophen 5-325 mg per tablet 1 tablet (has no administration in time range)     Medical Decision Making  Patient reports she fell 3 weeks grabbed tripping on her dog.  She has left shoulder pain and upper back pain.  She was offered x-rays.  She declined.  States she does not feel  anything is broken.  She took a medication at home that is about 8 years old.  She can not remember the name of it.  It did not help with the pain.    Amount and/or Complexity of Data Reviewed  Discussion of management or test interpretation with external provider(s): Patient has been given Percocet 5 mg here and Robaxin 500 mg here in the ED. She has been instructed to follow up with an orthopedist or her primary care doctor.  She was considering an MRI.  I have explained to this patient that we do not do MRIs in the emergency room.  That is an outpatient procedure.  At no time while in the ED did she appear to be in any acute distress.  I have written a prescription for diclofenac and Robaxin.    Risk  Prescription drug management.                                      Clinical Impression:  Final diagnoses:  [W19.XXXA] Fall, initial encounter (Primary)  [M25.512] Acute pain of left shoulder  [M62.838] Muscle spasm          ED Disposition Condition    Discharge Stable          ED Prescriptions       Medication Sig Dispense Start Date End Date Auth. Provider    methocarbamoL (ROBAXIN) 500 MG Tab Take 1 tablet (500 mg total) by mouth 3 (three) times daily. for 5 days 15 tablet 12/10/2023 12/15/2023 Marti Hilario NP    traMADoL (ULTRAM) 50 mg tablet Take 1 tablet (50 mg total) by mouth every 6 (six) hours as needed for Pain. 12 tablet 12/10/2023 -- Marti Hilario NP          Follow-up Information       Follow up With Specialties Details Why Contact Info    Du Aiken MD Internal Medicine In 3 days  [address] [facility] [address]  [phone]               Marti Hilario NP  12/10/23 7426

## 2023-12-10 NOTE — DISCHARGE INSTRUCTIONS
Make a follow-up appoint with the primary care doctor or an orthopedist of your choice.  Take the medication as prescribed return to the ED for any worsening of symptoms or any other concerns.

## 2023-12-13 ENCOUNTER — OFFICE VISIT (OUTPATIENT)
Dept: FAMILY MEDICINE | Facility: HOSPITAL | Age: 58
End: 2023-12-13
Payer: MEDICAID

## 2023-12-13 VITALS
RESPIRATION RATE: 18 BRPM | BODY MASS INDEX: 42.45 KG/M2 | SYSTOLIC BLOOD PRESSURE: 127 MMHG | HEART RATE: 77 BPM | DIASTOLIC BLOOD PRESSURE: 74 MMHG | WEIGHT: 286.63 LBS | HEIGHT: 69 IN

## 2023-12-13 DIAGNOSIS — M79.602 LEFT ARM PAIN: ICD-10-CM

## 2023-12-13 DIAGNOSIS — R56.9 SEIZURES: Primary | ICD-10-CM

## 2023-12-13 PROCEDURE — 99213 OFFICE O/P EST LOW 20 MIN: CPT | Performed by: PHYSICIAN ASSISTANT

## 2023-12-13 RX ORDER — CYCLOBENZAPRINE HCL 10 MG
10 TABLET ORAL 3 TIMES DAILY PRN
Qty: 30 TABLET | Refills: 1 | Status: SHIPPED | OUTPATIENT
Start: 2023-12-13 | End: 2024-01-02

## 2023-12-13 NOTE — PROGRESS NOTES
FAMILY MEDICINE  New Visit Progress Note   Recent Hospital Discharge     PRESENTING HISTORY     Chief Complaint/Reason for Admission:  Follow up Hospital Discharge   Chief Complaint   Patient presents with    Shoulder Injury    Fall     PCP: Du Aiken MD    History of Present Illness:  Ms. Trudi Howard is a 58 y.o. female who was recently admitted to the hospital.    HPI:  Trudi Howard is a 58 year old F with PMH of Epilepsy here today for hospital follow up. Patient recently presented to the ED after a fall. She tripped over her dog in the kitchen and landed on her left side. Discharged from ED with tramadol and robaxin.     Today patient is still c/o left arm pain that radiates from her Left shoulder blade down to wrist. The pain is about the same as after the incident occurred. Reports that medications from the ED are not working. Flexeril is the only thing that works. Reports her neurologist is fine with her taking Flexeril.     Epilepsy: No seizures since 2012. Taking ethosuximide, acetazolamide, or phenobarbital. Following with [facility] at [facility].     Review of Systems  General ROS: negative for chills, fever or weight loss  Psychological ROS: negative for hallucination, depression or suicidal ideation  Ophthalmic ROS: negative for blurry vision, photophobia or eye pain  ENT ROS: negative for epistaxis, sore throat or rhinorrhea  Respiratory ROS: no cough, shortness of breath, or wheezing  Cardiovascular ROS: no chest pain or dyspnea on exertion  Gastrointestinal ROS: no abdominal pain, change in bowel habits, or black/ bloody stools  Genito-Urinary ROS: no dysuria, trouble voiding, or hematuria  Musculoskeletal ROS: +left arm pain  Neurological ROS: no syncope or seizures; no ataxia  Dermatological ROS: negative for pruritis, rash and jaundice    PAST HISTORY:     Past Medical History:   Diagnosis Date    History of uterine fibroid     Intra-abdominal abscess 04/2017    Urinary  retention, ruptured bladder, infected urinoma, sepsis    Morbid obesity     RUSSELL (obstructive sleep apnea)     Ovarian cyst 2017    Seizures        Past Surgical History:   Procedure Laterality Date    BREAST SURGERY      BUNIONECTOMY       SECTION      Exploratory Laparotomy w/ Abscess Drainage  2017        fibroidectomy      INCISIONAL HERNIA REPAIR  2019    Laparoscopic-     TRIGGER FINGER RELEASE Right 2020    Procedure: RELEASE, TRIGGER FINGER;  Surgeon: Kenneth Tang Jr., MD;  Location: [facility];  Service: Orthopedics;  Laterality: Right;  right middle and ring fingers       Family History   Adopted: Yes   Problem Relation Age of Onset    Heart disease Mother        Social History     Socioeconomic History    Marital status: Legally    Tobacco Use    Smoking status: Never     Passive exposure: Never    Smokeless tobacco: Never   Substance and Sexual Activity    Alcohol use: Yes     Comment: seldom    Drug use: No    Sexual activity: Yes     Partners: Male     Birth control/protection: None     Comment:        MEDICATIONS & ALLERGIES:     Current Outpatient Medications on File Prior to Visit   Medication Sig Dispense Refill    acetaZOLAMIDE (DIAMOX) 250 MG tablet   4    albuterol (VENTOLIN HFA) 90 mcg/actuation inhaler Inhale 2 puffs into the lungs every 6 (six) hours as needed for Wheezing. Rescue 18 g 0    benzonatate (TESSALON PERLES) 100 MG capsule Take 2 capsules (200 mg total) by mouth 3 (three) times daily as needed for Cough. 30 capsule 1    cetirizine (ZYRTEC) 10 MG tablet Take 1 tablet (10 mg total) by mouth once daily. 30 tablet 2    ethosuximide (ZARONTIN) 250 mg Cap Take 250 mg by mouth 2 (two) times daily.      fluticasone propionate (FLONASE) 50 mcg/actuation nasal spray 1 spray (50 mcg total) by Each Nostril route once daily. 9.9 mL 0    loratadine (CLARITIN) 10 mg tablet Take 1 tablet (10 mg total) by mouth once daily. 30 tablet 3     "methocarbamoL (ROBAXIN) 500 MG Tab Take 1 tablet (500 mg total) by mouth 3 (three) times daily. for 5 days 15 tablet 0    phenobarbital (LUMINAL) 32.4 MG tablet 3 QAM AND 5 TS HS  5    [DISCONTINUED] traMADoL (ULTRAM) 50 mg tablet Take 1 tablet (50 mg total) by mouth every 6 (six) hours as needed for Pain. 12 tablet 0     No current facility-administered medications on file prior to visit.        Review of patient's allergies indicates:   Allergen Reactions    Aspirin      Contraindicated with other meds    Amoxicillin      rash    Milk containing products (dairy)     Peanut        OBJECTIVE:     Vital Signs:  Vitals:    12/13/23 1140   BP: 127/74   Pulse: 77   Resp: 18     Wt Readings from Last 3 Encounters:   12/13/23 1140 130 kg (286 lb 9.6 oz)   12/10/23 1635 129.7 kg (286 lb)   01/08/23 1618 121.6 kg (268 lb)     Body mass index is 42.32 kg/m².        Physical Exam:  /74   Pulse 77   Resp 18   Ht 5' 9" (1.753 m)   Wt 130 kg (286 lb 9.6 oz)   BMI 42.32 kg/m²     General appearance: Alert, cooperative, no distress  Constitutional: Oriented to person, place, and time. Appears well-developed and well-nourished.  HEENT: Normocephalic, atraumatic, neck symmetrical, no nasal discharge   Eyes: Conjunctivae/corneas clear, EOM's intact  Extremities: extremities symmetric; +able to move arm, but with occasional severe pain  Integument: Skin color, texture, turgor normal  Neurologic: Alert and oriented X 3, normal strength, normal coordination and gait  Psychiatric: no pressured speech; normal affect; no evidence of impaired cognition     Laboratory  Lab Results   Component Value Date    WBC 6.9 01/28/2019    HGB 12.7 01/28/2019    HCT 38.7 01/28/2019    .8 (H) 01/28/2019     01/28/2019     BMP  Lab Results   Component Value Date     08/28/2020    K 3.9 08/28/2020     (H) 08/28/2020    CO2 19 (L) 08/28/2020    BUN 12 08/28/2020    CREATININE 0.8 08/28/2020    CALCIUM 8.7 08/28/2020    " "ANIONGAP 7 (L) 08/28/2020     Lab Results   Component Value Date    ALT 14 08/25/2017    AST 21 01/28/2019    ALKPHOS 178 (H) 01/28/2019    BILITOT 0.4 01/28/2019     No results found for: "INR", "PROTIME"  Lab Results   Component Value Date    HGBA1C 5.0 07/28/2017     No results for input(s): "POCTGLUCOSE" in the last 72 hours.    ASSESSMENT & PLAN:     Seizures   -Controlled. Follow up with [facility] as scheduled. Continue current medications.     Left arm pain   -Likely just MSK. Will treat with Flexeril at patient request. Has taken in the past with good results.Can continue RICE, NSAIDs PRN.  Will follow up in about 3 weeks to establish care/re-evaluate. Consider imaging at that time.   -     cyclobenzaprine (FLEXERIL) 10 MG tablet; Take 1 tablet (10 mg total) by mouth 3 (three) times daily as needed for Muscle spasms.  Dispense: 30 tablet; Refill: 1      Scheduled Follow-up :  Future Appointments   Date Time Provider Department Center   1/4/2024  2:00 PM [facility]       Post Visit Medication List:     Medication List            Accurate as of December 13, 2023 11:44 AM. If you have any questions, ask your nurse or doctor.                START taking these medications      cyclobenzaprine 10 MG tablet  Commonly known as: FLEXERIL  Take 1 tablet (10 mg total) by mouth 3 (three) times daily as needed for Muscle spasms.  Started by: Nasim Strange PA-C            CONTINUE taking these medications      acetaZOLAMIDE 250 MG tablet  Commonly known as: DIAMOX     albuterol 90 mcg/actuation inhaler  Commonly known as: VENTOLIN HFA  Inhale 2 puffs into the lungs every 6 (six) hours as needed for Wheezing. Rescue     benzonatate 100 MG capsule  Commonly known as: TESSALON PERLES  Take 2 capsules (200 mg total) by mouth 3 (three) times daily as needed for Cough.     cetirizine 10 MG tablet  Commonly known as: ZYRTEC  Take 1 tablet (10 mg total) by mouth once daily.     ethosuximide 250 " mg Cap  Commonly known as: ZARONTIN     fluticasone propionate 50 mcg/actuation nasal spray  Commonly known as: FLONASE  1 spray (50 mcg total) by Each Nostril route once daily.     loratadine 10 mg tablet  Commonly known as: CLARITIN  Take 1 tablet (10 mg total) by mouth once daily.     methocarbamoL 500 MG Tab  Commonly known as: ROBAXIN  Take 1 tablet (500 mg total) by mouth 3 (three) times daily. for 5 days     PHENobarbitaL 32.4 MG tablet            STOP taking these medications      traMADoL 50 mg tablet  Commonly known as: ULTRAM  Stopped by: Nasim Strange PA-C               Where to Get Your Medications        [id number] - [facility] [address] AT [facility] [address]  [facility] [place] [address] [facility] [address]      Phone: [phone]   cyclobenzaprine 10 MG tablet         Signing Provider:  Nasim Strange PA-C

## 2024-02-09 ENCOUNTER — TELEPHONE (OUTPATIENT)
Dept: FAMILY MEDICINE | Facility: HOSPITAL | Age: 59
End: 2024-02-09
Payer: MEDICAID

## 2024-02-09 NOTE — TELEPHONE ENCOUNTER
Unable to leave a message for this patient.  Mailbox is full.    Appt has been cancelled as patient is + for covid.----- Message from Katherine Gan sent at 2/9/2024  9:01 AM CST -----  Regarding: question  Type:  Needs Medical Advice    Who Called: pt  Would the patient rather a call back or a response via [facility]? Call  Best Call Back Number: [phone]  Additional Information: pt stated she took a at home test for covid and stated it came back positive so she is wondering should  she reschedule or still come in for appointment

## 2024-02-12 ENCOUNTER — TELEPHONE (OUTPATIENT)
Dept: FAMILY MEDICINE | Facility: HOSPITAL | Age: 59
End: 2024-02-12
Payer: MEDICAID

## 2024-02-12 NOTE — TELEPHONE ENCOUNTER
Attempted to call patient regarding below inquiry.  However patient did not  and voice mailbox was full.  Will attempt to reach back at a later time.    Sudarshan Lassiter MD  \[facility]\"" Family Medicine, PGY-3  02/12/2024      ----- Message from Keisha Huber LPN sent at 2/9/2024  9:09 AM CST -----  Regarding: FW: advice    ----- Message -----  From: Katherine Gan  Sent: 2/9/2024   9:07 AM CST  To: [facility] Staff  Subject: advice                                           Type:  Needs Medical Advice    Who Called: pt  Would the patient rather a call back or a response via [facility]? Call  Best Call Back Number: [phone]  Additional Information: pt rescheduled her appointment she would like to know what she should take to help with her covid, she is having problems with coughing and congestion

## 2024-02-14 ENCOUNTER — OFFICE VISIT (OUTPATIENT)
Dept: FAMILY MEDICINE | Facility: HOSPITAL | Age: 59
End: 2024-02-14
Payer: MEDICAID

## 2024-02-14 VITALS
HEIGHT: 69 IN | DIASTOLIC BLOOD PRESSURE: 89 MMHG | HEART RATE: 79 BPM | BODY MASS INDEX: 41.86 KG/M2 | SYSTOLIC BLOOD PRESSURE: 146 MMHG | WEIGHT: 282.63 LBS | OXYGEN SATURATION: 96 %

## 2024-02-14 DIAGNOSIS — U07.1 COVID: Primary | ICD-10-CM

## 2024-02-14 DIAGNOSIS — R03.0 ELEVATED BLOOD PRESSURE READING: ICD-10-CM

## 2024-02-14 DIAGNOSIS — E66.9 OBESITY, UNSPECIFIED CLASSIFICATION, UNSPECIFIED OBESITY TYPE, UNSPECIFIED WHETHER SERIOUS COMORBIDITY PRESENT: ICD-10-CM

## 2024-02-14 PROCEDURE — 99213 OFFICE O/P EST LOW 20 MIN: CPT

## 2024-02-14 NOTE — PROGRESS NOTES
[facility] FAMILY PRACTICE CLINIC NOTE  Follow-up Visit      SUBJECTIVE:     Patient: Trudi Howard is a 58 y.o. female.    Chief Compliant:   Chief Complaint   Patient presents with    Nasal Congestion    Headache    Cough       History of Present Illness:  58 year old female PMHx of seizures presenting today for follow up COVID. Patient reports COVID symptoms roughly 10 days ago with cough, congestion, fever. Patient reports quarantining then but still having lingering symptoms to today, although improved. Continues to endorse cough, congestion, sore throat. Endorses she has tried OTC medications with moderate relief. Asks if she is suffering from long COVID to which patient was instructed that she is not in the timeframe for long COVID. Denies new onset shortness of breath, subjective fevers, chills, chest pain, nausea, vomiting, diarrhea, or lightheadedness.    #HCM  Instructed patient she did not have recent routine annual labs and was advised on the importance of having up to date blood counts, electrolytes, cholesterol levels, and blood sugar levels to which she was amenable to doing. Denies wanting any vaccinations this visit.     #Elevated blood pressure  Patient with BP of 146/89 this visit. Denies chest pain, shortness of breath, blurred vision. Endorses some headaches which she says is an on and off issues. Counseled on lifestyle modifications including diet and exercise. Patient not amenable to taking BP medication this visit, however, open to discussing more next visit.       Review of Systems   Constitutional:  Negative for chills and fever.   HENT:  Positive for congestion and sore throat.    Eyes:  Negative for blurred vision and pain.   Respiratory:  Positive for cough. Negative for shortness of breath and wheezing.    Cardiovascular:  Negative for chest pain and palpitations.   Gastrointestinal:  Negative for abdominal pain, constipation, diarrhea and nausea.   Neurological:  Negative for  "headaches.     A 10+ review of systems was performed with pertinent positives and negatives noted above in the history of present illness. Other systems were negative unless otherwise specified.    OBJECTIVE:     Vital Signs (Most Recent)  Vitals:    02/14/24 1448   BP: (!) 146/89   Pulse: 79   SpO2: 96%   Weight: 128.2 kg (282 lb 10.1 oz)   Height: 5' 9" (1.753 m)     BMI: Body mass index is 41.74 kg/m².     Physical Exam:  Physical Exam  Constitutional:       General: She is not in acute distress.     Appearance: She is obese. She is not ill-appearing or toxic-appearing.   HENT:      Head: Normocephalic and atraumatic.      Nose: Congestion present.      Mouth/Throat:      Pharynx: No oropharyngeal exudate or posterior oropharyngeal erythema.   Eyes:      Extraocular Movements: Extraocular movements intact.      Conjunctiva/sclera: Conjunctivae normal.      Pupils: Pupils are equal, round, and reactive to light.   Cardiovascular:      Rate and Rhythm: Normal rate and regular rhythm.   Pulmonary:      Effort: Pulmonary effort is normal. No respiratory distress.      Breath sounds: No wheezing or rales.   Abdominal:      General: Abdomen is flat. There is no distension.      Palpations: Abdomen is soft.      Tenderness: There is no abdominal tenderness.   Neurological:      General: No focal deficit present.      Mental Status: She is alert.          ASSESSMENT:   Trudi Howard is a 58 y.o. female who presents to clinic for follow up COVID    1. COVID    2. Obesity, unspecified classification, unspecified obesity type, unspecified whether serious comorbidity present         PLAN:     COVID  - Continues to endorse cough, congestion, sore throat.   - Endorses she has tried OTC medications with moderate relief.   - Asks if she is suffering from long COVID to which patient was instructed that she is not in the timeframe for long COVID.   - Denies new onset shortness of breath, subjective fevers, chills, chest " pain, nausea, vomiting, diarrhea, or lightheadedness.  Plan:  Patient instructed to continue supportive care include adequate hydration and OTC tylenol or ibuprofen.     Obesity, unspecified classification, unspecified obesity type, unspecified whether serious comorbidity present  -     CBC Auto Differential; Future; Expected date: 02/14/2024  -     Comprehensive Metabolic Panel; Future; Expected date: 02/14/2024  -     HEMOGLOBIN A1C; Future; Expected date: 02/14/2024  -     TSH; Future; Expected date: 02/14/2024  -     Lipid Panel; Future; Expected date: 02/14/2024  Instructed patient she did not have recent routine annual labs and was advised on the importance of having up to date blood counts, electrolytes, cholesterol levels, and blood sugar levels to which she was amenable to doing. Denies wanting any vaccinations this visit. Counseled on lifestyle modifications including mild to moderate exercise daily and reduction of high salt, fatty, and sugary foods.     Elevated blood pressure reading  Counseled on lifestyle modifications including diet and exercise. Patient not amenable to taking BP medication this visit, however, open to discussing more next visit.      Provided patient with anticipatory guidance and return precautions. Treatment plan discussed with patient, all questions answered, and patient acknowledged understanding and verbal agreement.      Follow-up in: 1 months; or sooner PRN if acute concerns arise.      Case discussed with Dr. BANDAR Garcia Do, MD  [facility] Family Medicine PGY-1

## 2024-02-26 NOTE — PROGRESS NOTES
I assume primary medical responsibility for this patient. I have reviewed the history, physical, and assessement & treatment plan with the resident and agree that the care is reasonable and necessary. This service has been performed by a resident without the presence of a teaching physician under the primary care exception. If necessary, an addendum of additional findings or evaluation beyond the resident documentation will be noted below.    Agree with conservative management for residual URI Covid symptoms. Hx of elevated BP with dx of HTN resistant to medications at this time. Continued follow up to review importance of good BP control and risks.

## 2024-04-22 NOTE — PROGRESS NOTES
Occupational Therapy Daily Treatment Note     Date: 7/1/2019  Name: Trudi Howard  Clinic Number: [id number]    Therapy Diagnosis:   Encounter Diagnoses   Name Primary?    Decreased range of motion of finger of right hand     Pain in right hand      Physician: Kenneth Tagn Jr., *  Physician Orders: Eval and treat  Other Comments: has had 2 steroid injections in her right hand  Medical Diagnosis:   M65.30 (ICD-10-CM) - Trigger finger, unspecified finger, unspecified laterality   Referral Notes   Number of Notes: 1      Surgical Procedure and Date: N/A  Evaluation Date: 5/10/2019  Insurance: Medicaid  Insurance Authorization period Expiration: 07/29/2019      Plan of Care Certification Period: 5/10/2019 to 7/10/2019     Visit # / Visits Authortized: 6/ 20  Time In: 11:00 AM  Time Out: 11:50 AM  Total Time: 50 minutes  Total Treatment Time: 50 minutes  Charges: 3 TA     Precautions: Standard           Subjective     Pt reports: she was not compliant with home exercise program given last session.   Response to previous treatment:  Pt reports that her pain level is less today. Pt reports that the large LNB is causing a pressure point on the ring finger.  Functional change: none     Pain: 1/10  Location: right hand     Objective     Observations: Upon assessing pt's dynamic splint for her ring finger, she was applying the splint backwards.    Edema. Measured in centimeters.    5/10/2019 5/10/2019 7/1/2019     Left Right Right   Long:          P1 6.5 7.5 7.1   Ring:          P1          6.2 7.4 7.2      Hand ROM. Measured in degrees.    5/10/2019 7/1/2019     Right Right          Index: MP  0/50 0/65              PIP     0/90 0/90              DIP 0//68 0/68              DURAN 208 223          Long:  MP 0/50 0/60              PIP 33/93 26/93              DIP 0/65 0/65              DURAN 175 192          Ring:   MP 0/50 0/54              PIP 21/90 19/90              DIP 0/67 0/70               DURAN 186 195          Small:  MP 0/40 0/43               PIP 0/80 0/84               DIP 0/75 0/75              DURAN 195 202             Sensation: Intact      Strength (Dyanmometer) and Pinch Strength (Pinch Gauge)  Measured in pounds and psi. Average of three trials.    5/10/2019 5/10/2019 7/1/2019     Left Right Right   Rung II 60 28 28   Mendieta Pinch 12 11 11   3pt Pinch 11 13 12   2pt Pinch 8 9 9      Palpation:  Positive for trigger finger with palpation of volar MP of   Long and ring finger    Trudi participated in dynamic functional therapeutic activities to improve functional performance for 50 minutes, including:   -Patient received fluidotherapy to right hand for 15 minutes to increase blood flow, circulation, desensitization, sensory re-education and for pain management   -IASTYM to volar MP of long and ring on right hand  -AROM as follows: jt blocking, hook, wave, abd/add, finger ext x 10 reps each  -red putty - flowers 5 x  -digi-extend x 30 reps with green rb  -towel scrunches x 10 reps     Issued dynamic LNB xl orthotic for finger extension of her ring finger. Pt educated on wear and care and demonstrated good understanding.    Home Exercises and Education Provided     Education provided:   - Correct application of the LNB dynamic PIP ext splint  - Progress towards goals: Fair    Written Home Exercises Provided: Patient instructed to cont prior HEP.  Exercises were reviewed and Trudi was able to demonstrate them prior to the end of the session.  Trudi demonstrated good  understanding of the education provided.     See EMR under Patient Instructions for exercises provided prior visit.     Trudi demonstrated good  understanding of the education provided.         Assessment   Trudi Howard is a 53 y.o. female referred to outpatient occupational/hand therapy and presents with a medical diagnosis of right long ad ring finger, resulting in pain, edema, decreased range of motion, decreased  strength and  decreased functional use of right hand. Pt reports that her pain level is less today. Pt's edema, ROM, and  and pinch was measured today. Pt's edema has decrease  . 2 - . 4cm. Pt's finger DURAN has increased in all fingers. Issued xl orthotic for finger extension. Pt educated on wear and care. Pt verbalized and demonstrated good understanding. Continued light strengthening ex for fingers with no difficulty.     Pt would continue to benefit from skilled OT.      Trudi is progressing well towards her goals and there are no updates to goals at this time. Pt prognosis is fair.     Pt will continue to benefit from skilled outpatient occupational therapy to address the deficits listed in the problem list on initial evaluation provide pt/family education and to maximize pt's level of independence in the home and community environment.     Anticipated barriers to occupational therapy: attention span    Pt's spiritual, cultural and educational needs considered and pt agreeable to plan of care and goals.    Goals:  Short Term (4 weeks on 6/10/2019):  1)   Patient to be IND with HEP and modalities for pain management. - progressing  2)   Increase ROM 10-15 degrees for PIP extension of right long and ring finger  to increase functional hand use for releasing objects.- progressing  3)   Increase  strength 5-10 lbs. to grasp containers.-progressing  4)   Decrease edema .2-.3 cm to increase joint mobility /flexibility for improved overall functional hand use. - Met 7/1/2019        Long Term (by discharge):  1)   Pt will report 0 out of 10 pain with right hand at rest.-progressing  2)   Patient to score at 48% or less on FOTO to demonstrate improved perception of functional right hand use.-progressing  3)   Pt will return to prior level of function for ADLs and household management. -progressing            Plan:    Certification Period/Plan of care expiration: 5/10/2019 to 7/10/2019.     Outpatient Occupational Therapy 2 times  "weekly for 8 weeks may include the following interventions: Paraffin, Fluidotherapy, Manual therapy/joint mobilizations, Modalities for pain management, US 3 mhz, Therapeutic exercises/activities., Strengthening, Orthotic Fabrication/Fit/Training and Edema Control.       Discussed Plan of Care with patient: Yes  Updates/Grading for next session: none    Destiny BASHIR    "I certify that I was present in the room directing the student in service delivery and guiding them using my skilled judgement. As the co-signing therapist, I have reviewed the student's documentation and am responsible for the treatment, assessment and plan."    Therapist: MARTHA Del Rio CHT  Occupational Therapist  Certified Hand Therapist  Day Massey, OT   " Never smoker

## 2024-11-19 ENCOUNTER — OFFICE VISIT (OUTPATIENT)
Dept: OBSTETRICS AND GYNECOLOGY | Facility: CLINIC | Age: 59
End: 2024-11-19
Payer: MEDICAID

## 2024-11-19 VITALS — BODY MASS INDEX: 41.7 KG/M2 | WEIGHT: 282.38 LBS | DIASTOLIC BLOOD PRESSURE: 78 MMHG | SYSTOLIC BLOOD PRESSURE: 137 MMHG

## 2024-11-19 DIAGNOSIS — N95.0 POST-MENOPAUSE BLEEDING: Primary | ICD-10-CM

## 2024-11-19 DIAGNOSIS — Z01.419 WELL WOMAN EXAM WITH ROUTINE GYNECOLOGICAL EXAM: ICD-10-CM

## 2024-11-19 DIAGNOSIS — Z12.4 CERVICAL CANCER SCREENING: ICD-10-CM

## 2024-11-19 PROCEDURE — 99213 OFFICE O/P EST LOW 20 MIN: CPT | Mod: PBBFAC,PO | Performed by: OBSTETRICS & GYNECOLOGY

## 2024-11-19 PROCEDURE — 99999 PR PBB SHADOW E&M-EST. PATIENT-LVL III: CPT | Mod: PBBFAC,,, | Performed by: OBSTETRICS & GYNECOLOGY

## 2024-11-19 NOTE — PROGRESS NOTES
HPI:   59 y.o.   OB History          3    Para   3    Term   3            AB        Living   3         SAB        IAB        Ectopic        Multiple        Live Births                  No LMP recorded. Patient is premenopausal.    Patient is  here for her annual gynecologic exam.  She has no complaints.     ROS:  GENERAL: No fever, chills, fatigability or weight loss.  SKIN: No rashes, itching or changes in color or texture of skin.  HEAD: No headaches or recent head trauma.  EYES: Visual acuity fine. No photophobia, ocular pain or diplopia.  EARS: Denies ear pain, discharge or vertigo.  NOSE: No loss of smell, no epistaxis or postnasal drip.  MOUTH & THROAT: No hoarseness or change in voice. No excessive gum bleeding.  NODES: Denies swollen glands.  CHEST: Denies MARTINEZ, cyanosis, wheezing, cough and sputum production.  CARDIOVASCULAR: Denies chest pain, PND, orthopnea or reduced exercise tolerance.  ABDOMEN: Appetite fine. No weight loss. Denies diarrhea, abdominal pain, hematemesis or blood in stool.  URINARY: No flank pain, dysuria or hematuria.  PERIPHERAL VASCULAR: No claudication or cyanosis.  MUSCULOSKELETAL: No joint stiffness or swelling. Denies back pain.  NEUROLOGIC: No history of seizures, paralysis, alteration of gait or coordination.    PE:   /78   Wt 128.1 kg (282 lb 6.4 oz)   BMI 41.70 kg/m²   APPEARANCE: Well nourished, well developed, in no acute distress.  NECK: Neck symmetric without masses or thyromegaly.  BREASTS: Symmetrical, no skin changes or visible lesions. No palpable masses, nipple discharge or adenopathy bilaterally.  ABDOMEN: Flat. Soft. No tenderness or masses. No hepatosplenomegaly. No hernias. No CVA tenderness.  VULVA: No lesions. Normal female genitalia.  URETHRAL MEATUS: Normal size and location, no lesions, no prolapse.  URETHRA: No masses, tenderness, prolapse or scarring.  VAGINA: Moist and well rugated, no discharge, no significant cystocele or  rectocele.  CERVIX: cannot visualize cx, hard to palpate uterus  ADNEXA: No masses, tenderness or CDS nodularity.  ANUS PERINEUM: Normal.    PROCEDURES:  Pap smear    Assessment:  Normal Gynecologic Exam    Plan:  Mammogram and Colonoscopy if indicated by current recommendations.  Return to clinic in one year or for any problems or complaints.  Pt states has very light blood sometimes, been going on for at  least a year  Can't tolerate spec exam blind pap done  Will need pelvic us  (Was in hospital for sepsis, doesn't know why, had drainage and mesh, )

## 2024-12-04 ENCOUNTER — TELEPHONE (OUTPATIENT)
Dept: OBSTETRICS AND GYNECOLOGY | Facility: CLINIC | Age: 59
End: 2024-12-04

## 2024-12-04 NOTE — TELEPHONE ENCOUNTER
Please tell/leave message that she needs to reschedule her mammo and pelvic ultrasound,,  It says she 'no show'  Thanks

## 2025-08-28 ENCOUNTER — OFFICE VISIT (OUTPATIENT)
Dept: URGENT CARE | Facility: CLINIC | Age: 60
End: 2025-08-28
Payer: MEDICAID

## 2025-08-28 VITALS
OXYGEN SATURATION: 95 % | HEIGHT: 69 IN | SYSTOLIC BLOOD PRESSURE: 142 MMHG | TEMPERATURE: 99 F | HEART RATE: 78 BPM | DIASTOLIC BLOOD PRESSURE: 87 MMHG | BODY MASS INDEX: 41.32 KG/M2 | RESPIRATION RATE: 18 BRPM | WEIGHT: 279 LBS

## 2025-08-28 DIAGNOSIS — D22.9 ATYPICAL MOLE: ICD-10-CM

## 2025-08-28 DIAGNOSIS — L03.032 CELLULITIS OF SECOND TOE OF LEFT FOOT: Primary | ICD-10-CM

## 2025-08-28 PROCEDURE — 99213 OFFICE O/P EST LOW 20 MIN: CPT | Mod: S$GLB,,,

## 2025-08-28 RX ORDER — MUPIROCIN 20 MG/G
OINTMENT TOPICAL 3 TIMES DAILY
Qty: 15 G | Refills: 0 | Status: SHIPPED | OUTPATIENT
Start: 2025-08-28 | End: 2025-09-04

## 2025-08-28 RX ORDER — ACETAZOLAMIDE 250 MG/1
250 TABLET ORAL 2 TIMES DAILY
COMMUNITY
Start: 2025-08-11

## 2025-08-28 RX ORDER — SULFAMETHOXAZOLE AND TRIMETHOPRIM 800; 160 MG/1; MG/1
1 TABLET ORAL 2 TIMES DAILY
Qty: 14 TABLET | Refills: 0 | Status: SHIPPED | OUTPATIENT
Start: 2025-08-28 | End: 2025-09-04

## 2025-08-28 RX ORDER — ERGOCALCIFEROL 1.25 MG/1
50000 CAPSULE ORAL
COMMUNITY
Start: 2025-08-14 | End: 2025-10-09

## 2025-09-03 ENCOUNTER — TELEPHONE (OUTPATIENT)
Dept: URGENT CARE | Facility: CLINIC | Age: 60
End: 2025-09-03
Payer: MEDICAID

## 2025-09-03 DIAGNOSIS — L03.032 CELLULITIS OF SECOND TOE OF LEFT FOOT: Primary | ICD-10-CM

## 2025-09-03 RX ORDER — MUPIROCIN 20 MG/G
OINTMENT TOPICAL 3 TIMES DAILY
Qty: 22 G | Refills: 0 | Status: SHIPPED | OUTPATIENT
Start: 2025-09-03 | End: 2025-09-10

## (undated) DEVICE — SEE MEDLINE ITEM 157116

## (undated) DEVICE — MANIFOLD 4 PORT

## (undated) DEVICE — ALCOHOL 70% ISOP W/GREEN 16OZ

## (undated) DEVICE — NDL HYPO REG 25G X 1 1/2

## (undated) DEVICE — APPLICATOR CHLORAPREP ORN 26ML

## (undated) DEVICE — SEE L#120831

## (undated) DEVICE — SYR B-D DISP CONTROL 10CC100/C

## (undated) DEVICE — COVER OVERHEAD SURG LT BLUE

## (undated) DEVICE — SEE MEDLINE ITEM 157173

## (undated) DEVICE — BLADE SCALP OPHTL BEVEL STR

## (undated) DEVICE — STOCKINET 4INX48

## (undated) DEVICE — TOURNIQUET SB QC DP 18X4IN

## (undated) DEVICE — PACK BASIC

## (undated) DEVICE — SEE MEDLINE ITEM 156955

## (undated) DEVICE — GAUZE SPONGE 4X4 12PLY

## (undated) DEVICE — DRESSING XEROFORM FOIL PK 1X8

## (undated) DEVICE — SEE MEDLINE ITEM 152522

## (undated) DEVICE — PAD CAST 2 IN X 4YDS STERILE

## (undated) DEVICE — PAD PREP 50/CA

## (undated) DEVICE — GLOVE SURG BIOGEL LATEX SZ 7.5

## (undated) DEVICE — BLADE SURG #15 CARBON STEEL

## (undated) DEVICE — BANDAGE ELASTIC 2X5 VELCRO ST

## (undated) DEVICE — SEE MEDLINE ITEM 157117